# Patient Record
Sex: MALE | Race: WHITE | NOT HISPANIC OR LATINO | Employment: OTHER | URBAN - METROPOLITAN AREA
[De-identification: names, ages, dates, MRNs, and addresses within clinical notes are randomized per-mention and may not be internally consistent; named-entity substitution may affect disease eponyms.]

---

## 2018-02-02 ENCOUNTER — HOSPITAL ENCOUNTER (INPATIENT)
Facility: MEDICAL CENTER | Age: 58
LOS: 5 days | DRG: 233 | End: 2018-02-07
Attending: EMERGENCY MEDICINE | Admitting: HOSPITALIST
Payer: MEDICARE

## 2018-02-02 ENCOUNTER — RESOLUTE PROFESSIONAL BILLING HOSPITAL PROF FEE (OUTPATIENT)
Dept: HOSPITALIST | Facility: MEDICAL CENTER | Age: 58
End: 2018-02-02
Payer: MEDICARE

## 2018-02-02 ENCOUNTER — APPOINTMENT (OUTPATIENT)
Dept: RADIOLOGY | Facility: MEDICAL CENTER | Age: 58
DRG: 233 | End: 2018-02-02
Attending: EMERGENCY MEDICINE
Payer: MEDICARE

## 2018-02-02 DIAGNOSIS — G89.18 ACUTE POST-OPERATIVE PAIN: ICD-10-CM

## 2018-02-02 DIAGNOSIS — I21.4 NON-ST ELEVATION (NSTEMI) MYOCARDIAL INFARCTION (HCC): ICD-10-CM

## 2018-02-02 DIAGNOSIS — I24.9 ACS (ACUTE CORONARY SYNDROME) (HCC): ICD-10-CM

## 2018-02-02 LAB
ABO GROUP BLD: NORMAL
ABO GROUP BLD: NORMAL
ALBUMIN SERPL BCP-MCNC: 4.1 G/DL (ref 3.2–4.9)
ALBUMIN/GLOB SERPL: 1.8 G/DL
ALP SERPL-CCNC: 57 U/L (ref 30–99)
ALT SERPL-CCNC: 21 U/L (ref 2–50)
ANION GAP SERPL CALC-SCNC: 10 MMOL/L (ref 0–11.9)
ANION GAP SERPL CALC-SCNC: 8 MMOL/L (ref 0–11.9)
APTT PPP: 29.7 SEC (ref 24.7–36)
AST SERPL-CCNC: 34 U/L (ref 12–45)
BASOPHILS # BLD AUTO: 0.5 % (ref 0–1.8)
BASOPHILS # BLD: 0.04 K/UL (ref 0–0.12)
BILIRUB SERPL-MCNC: 0.9 MG/DL (ref 0.1–1.5)
BLD GP AB SCN SERPL QL: NORMAL
BNP SERPL-MCNC: 259 PG/ML (ref 0–100)
BUN SERPL-MCNC: 12 MG/DL (ref 8–22)
BUN SERPL-MCNC: 14 MG/DL (ref 8–22)
CALCIUM SERPL-MCNC: 8.4 MG/DL (ref 8.5–10.5)
CALCIUM SERPL-MCNC: 9.1 MG/DL (ref 8.5–10.5)
CHLORIDE SERPL-SCNC: 108 MMOL/L (ref 96–112)
CHLORIDE SERPL-SCNC: 108 MMOL/L (ref 96–112)
CHOLEST SERPL-MCNC: 157 MG/DL (ref 100–199)
CO2 SERPL-SCNC: 22 MMOL/L (ref 20–33)
CO2 SERPL-SCNC: 23 MMOL/L (ref 20–33)
CREAT SERPL-MCNC: 0.81 MG/DL (ref 0.5–1.4)
CREAT SERPL-MCNC: 0.88 MG/DL (ref 0.5–1.4)
EKG IMPRESSION: NORMAL
EOSINOPHIL # BLD AUTO: 0.07 K/UL (ref 0–0.51)
EOSINOPHIL NFR BLD: 0.8 % (ref 0–6.9)
ERYTHROCYTE [DISTWIDTH] IN BLOOD BY AUTOMATED COUNT: 41.6 FL (ref 35.9–50)
ERYTHROCYTE [DISTWIDTH] IN BLOOD BY AUTOMATED COUNT: 42.6 FL (ref 35.9–50)
GLOBULIN SER CALC-MCNC: 2.3 G/DL (ref 1.9–3.5)
GLUCOSE SERPL-MCNC: 102 MG/DL (ref 65–99)
GLUCOSE SERPL-MCNC: 96 MG/DL (ref 65–99)
HCT VFR BLD AUTO: 43.7 % (ref 42–52)
HCT VFR BLD AUTO: 43.9 % (ref 42–52)
HDLC SERPL-MCNC: 36 MG/DL
HGB BLD-MCNC: 15.4 G/DL (ref 14–18)
HGB BLD-MCNC: 15.5 G/DL (ref 14–18)
IMM GRANULOCYTES # BLD AUTO: 0.02 K/UL (ref 0–0.11)
IMM GRANULOCYTES NFR BLD AUTO: 0.2 % (ref 0–0.9)
INR PPP: 1.03 (ref 0.87–1.13)
LDLC SERPL CALC-MCNC: 101 MG/DL
LIPASE SERPL-CCNC: 11 U/L (ref 11–82)
LV EJECT FRACT  99904: 20
LV EJECT FRACT MOD 2C 99903: 45.13
LV EJECT FRACT MOD 4C 99902: 38.93
LV EJECT FRACT MOD BP 99901: 38.12
LYMPHOCYTES # BLD AUTO: 1.25 K/UL (ref 1–4.8)
LYMPHOCYTES NFR BLD: 14.2 % (ref 22–41)
MCH RBC QN AUTO: 34.1 PG (ref 27–33)
MCH RBC QN AUTO: 34.1 PG (ref 27–33)
MCHC RBC AUTO-ENTMCNC: 35.1 G/DL (ref 33.7–35.3)
MCHC RBC AUTO-ENTMCNC: 35.5 G/DL (ref 33.7–35.3)
MCV RBC AUTO: 96 FL (ref 81.4–97.8)
MCV RBC AUTO: 97.1 FL (ref 81.4–97.8)
MONOCYTES # BLD AUTO: 0.64 K/UL (ref 0–0.85)
MONOCYTES NFR BLD AUTO: 7.3 % (ref 0–13.4)
NEUTROPHILS # BLD AUTO: 6.8 K/UL (ref 1.82–7.42)
NEUTROPHILS NFR BLD: 77 % (ref 44–72)
NRBC # BLD AUTO: 0 K/UL
NRBC BLD-RTO: 0 /100 WBC
PLATELET # BLD AUTO: 193 K/UL (ref 164–446)
PLATELET # BLD AUTO: 196 K/UL (ref 164–446)
PMV BLD AUTO: 11 FL (ref 9–12.9)
PMV BLD AUTO: 11.1 FL (ref 9–12.9)
POTASSIUM SERPL-SCNC: 3.6 MMOL/L (ref 3.6–5.5)
POTASSIUM SERPL-SCNC: 3.7 MMOL/L (ref 3.6–5.5)
PROT SERPL-MCNC: 6.4 G/DL (ref 6–8.2)
PROTHROMBIN TIME: 13.2 SEC (ref 12–14.6)
RBC # BLD AUTO: 4.52 M/UL (ref 4.7–6.1)
RBC # BLD AUTO: 4.55 M/UL (ref 4.7–6.1)
RH BLD: NORMAL
RH BLD: NORMAL
SODIUM SERPL-SCNC: 139 MMOL/L (ref 135–145)
SODIUM SERPL-SCNC: 140 MMOL/L (ref 135–145)
TRIGL SERPL-MCNC: 99 MG/DL (ref 0–149)
TROPONIN I SERPL-MCNC: 8.25 NG/ML (ref 0–0.04)
TROPONIN I SERPL-MCNC: 9.58 NG/ML (ref 0–0.04)
WBC # BLD AUTO: 8.8 K/UL (ref 4.8–10.8)
WBC # BLD AUTO: 9.4 K/UL (ref 4.8–10.8)

## 2018-02-02 PROCEDURE — B2111ZZ FLUOROSCOPY OF MULTIPLE CORONARY ARTERIES USING LOW OSMOLAR CONTRAST: ICD-10-PCS | Performed by: INTERNAL MEDICINE

## 2018-02-02 PROCEDURE — 700102 HCHG RX REV CODE 250 W/ 637 OVERRIDE(OP)

## 2018-02-02 PROCEDURE — 85025 COMPLETE CBC W/AUTO DIFF WBC: CPT

## 2018-02-02 PROCEDURE — 360979 HCHG DIAGNOSTIC CATH

## 2018-02-02 PROCEDURE — 4A023N7 MEASUREMENT OF CARDIAC SAMPLING AND PRESSURE, LEFT HEART, PERCUTANEOUS APPROACH: ICD-10-PCS | Performed by: INTERNAL MEDICINE

## 2018-02-02 PROCEDURE — 85610 PROTHROMBIN TIME: CPT | Mod: 91

## 2018-02-02 PROCEDURE — 99152 MOD SED SAME PHYS/QHP 5/>YRS: CPT

## 2018-02-02 PROCEDURE — 80061 LIPID PANEL: CPT

## 2018-02-02 PROCEDURE — 80053 COMPREHEN METABOLIC PANEL: CPT

## 2018-02-02 PROCEDURE — A9270 NON-COVERED ITEM OR SERVICE: HCPCS

## 2018-02-02 PROCEDURE — 83690 ASSAY OF LIPASE: CPT

## 2018-02-02 PROCEDURE — 93005 ELECTROCARDIOGRAM TRACING: CPT

## 2018-02-02 PROCEDURE — 86900 BLOOD TYPING SEROLOGIC ABO: CPT

## 2018-02-02 PROCEDURE — 99291 CRITICAL CARE FIRST HOUR: CPT

## 2018-02-02 PROCEDURE — 700102 HCHG RX REV CODE 250 W/ 637 OVERRIDE(OP): Performed by: NURSE PRACTITIONER

## 2018-02-02 PROCEDURE — 93306 TTE W/DOPPLER COMPLETE: CPT | Mod: 26 | Performed by: INTERNAL MEDICINE

## 2018-02-02 PROCEDURE — 80048 BASIC METABOLIC PNL TOTAL CA: CPT

## 2018-02-02 PROCEDURE — 93458 L HRT ARTERY/VENTRICLE ANGIO: CPT

## 2018-02-02 PROCEDURE — 307093 HCHG TR BAND RADIAL

## 2018-02-02 PROCEDURE — 700111 HCHG RX REV CODE 636 W/ 250 OVERRIDE (IP): Performed by: EMERGENCY MEDICINE

## 2018-02-02 PROCEDURE — B2151ZZ FLUOROSCOPY OF LEFT HEART USING LOW OSMOLAR CONTRAST: ICD-10-PCS | Performed by: INTERNAL MEDICINE

## 2018-02-02 PROCEDURE — 700105 HCHG RX REV CODE 258: Performed by: INTERNAL MEDICINE

## 2018-02-02 PROCEDURE — A9270 NON-COVERED ITEM OR SERVICE: HCPCS | Performed by: HOSPITALIST

## 2018-02-02 PROCEDURE — 700117 HCHG RX CONTRAST REV CODE 255: Performed by: INTERNAL MEDICINE

## 2018-02-02 PROCEDURE — 86901 BLOOD TYPING SEROLOGIC RH(D): CPT

## 2018-02-02 PROCEDURE — 93005 ELECTROCARDIOGRAM TRACING: CPT | Performed by: EMERGENCY MEDICINE

## 2018-02-02 PROCEDURE — 86850 RBC ANTIBODY SCREEN: CPT

## 2018-02-02 PROCEDURE — 700102 HCHG RX REV CODE 250 W/ 637 OVERRIDE(OP): Performed by: INTERNAL MEDICINE

## 2018-02-02 PROCEDURE — C1894 INTRO/SHEATH, NON-LASER: HCPCS

## 2018-02-02 PROCEDURE — 700102 HCHG RX REV CODE 250 W/ 637 OVERRIDE(OP): Performed by: HOSPITALIST

## 2018-02-02 PROCEDURE — 85730 THROMBOPLASTIN TIME PARTIAL: CPT | Mod: 91

## 2018-02-02 PROCEDURE — 96365 THER/PROPH/DIAG IV INF INIT: CPT

## 2018-02-02 PROCEDURE — 84484 ASSAY OF TROPONIN QUANT: CPT | Mod: 91

## 2018-02-02 PROCEDURE — 83880 ASSAY OF NATRIURETIC PEPTIDE: CPT

## 2018-02-02 PROCEDURE — 93880 EXTRACRANIAL BILAT STUDY: CPT

## 2018-02-02 PROCEDURE — 93970 EXTREMITY STUDY: CPT

## 2018-02-02 PROCEDURE — 99223 1ST HOSP IP/OBS HIGH 75: CPT | Performed by: HOSPITALIST

## 2018-02-02 PROCEDURE — 83036 HEMOGLOBIN GLYCOSYLATED A1C: CPT

## 2018-02-02 PROCEDURE — 71045 X-RAY EXAM CHEST 1 VIEW: CPT

## 2018-02-02 PROCEDURE — 96375 TX/PRO/DX INJ NEW DRUG ADDON: CPT

## 2018-02-02 PROCEDURE — 99153 MOD SED SAME PHYS/QHP EA: CPT

## 2018-02-02 PROCEDURE — A9270 NON-COVERED ITEM OR SERVICE: HCPCS | Performed by: INTERNAL MEDICINE

## 2018-02-02 PROCEDURE — 96366 THER/PROPH/DIAG IV INF ADDON: CPT

## 2018-02-02 PROCEDURE — C1769 GUIDE WIRE: HCPCS

## 2018-02-02 PROCEDURE — 304952 HCHG R 2 PADS

## 2018-02-02 PROCEDURE — 770022 HCHG ROOM/CARE - ICU (200)

## 2018-02-02 PROCEDURE — 93306 TTE W/DOPPLER COMPLETE: CPT

## 2018-02-02 PROCEDURE — 700111 HCHG RX REV CODE 636 W/ 250 OVERRIDE (IP)

## 2018-02-02 PROCEDURE — 700101 HCHG RX REV CODE 250

## 2018-02-02 PROCEDURE — 85027 COMPLETE CBC AUTOMATED: CPT

## 2018-02-02 RX ORDER — MIDAZOLAM HYDROCHLORIDE 1 MG/ML
INJECTION INTRAMUSCULAR; INTRAVENOUS
Status: COMPLETED
Start: 2018-02-02 | End: 2018-02-02

## 2018-02-02 RX ORDER — LIDOCAINE HYDROCHLORIDE 20 MG/ML
INJECTION, SOLUTION INFILTRATION; PERINEURAL
Status: COMPLETED
Start: 2018-02-02 | End: 2018-02-02

## 2018-02-02 RX ORDER — HEPARIN SODIUM 1000 [USP'U]/ML
3800 INJECTION, SOLUTION INTRAVENOUS; SUBCUTANEOUS PRN
Status: DISCONTINUED | OUTPATIENT
Start: 2018-02-02 | End: 2018-02-02

## 2018-02-02 RX ORDER — PROMETHAZINE HYDROCHLORIDE 25 MG/1
12.5-25 TABLET ORAL EVERY 4 HOURS PRN
Status: DISCONTINUED | OUTPATIENT
Start: 2018-02-02 | End: 2018-02-03

## 2018-02-02 RX ORDER — NITROGLYCERIN 0.4 MG/1
0.4 TABLET SUBLINGUAL
Status: DISCONTINUED | OUTPATIENT
Start: 2018-02-02 | End: 2018-02-03

## 2018-02-02 RX ORDER — NITROGLYCERIN 80 MG/1
PATCH TRANSDERMAL
Status: COMPLETED
Start: 2018-02-02 | End: 2018-02-02

## 2018-02-02 RX ORDER — ASPIRIN 325 MG
325 TABLET ORAL DAILY
Status: DISCONTINUED | OUTPATIENT
Start: 2018-02-03 | End: 2018-02-02

## 2018-02-02 RX ORDER — SODIUM CHLORIDE 9 MG/ML
INJECTION, SOLUTION INTRAVENOUS CONTINUOUS
Status: DISCONTINUED | OUTPATIENT
Start: 2018-02-02 | End: 2018-02-02

## 2018-02-02 RX ORDER — VERAPAMIL HYDROCHLORIDE 2.5 MG/ML
INJECTION, SOLUTION INTRAVENOUS
Status: COMPLETED
Start: 2018-02-02 | End: 2018-02-02

## 2018-02-02 RX ORDER — HEPARIN SODIUM 1000 [USP'U]/ML
3800 INJECTION, SOLUTION INTRAVENOUS; SUBCUTANEOUS PRN
Status: DISCONTINUED | OUTPATIENT
Start: 2018-02-02 | End: 2018-02-03

## 2018-02-02 RX ORDER — NITROGLYCERIN 80 MG/1
1 PATCH TRANSDERMAL DAILY
Status: DISCONTINUED | OUTPATIENT
Start: 2018-02-02 | End: 2018-02-03

## 2018-02-02 RX ORDER — ALPRAZOLAM 0.25 MG/1
0.25 TABLET ORAL EVERY 6 HOURS PRN
Status: DISCONTINUED | OUTPATIENT
Start: 2018-02-02 | End: 2018-02-03

## 2018-02-02 RX ORDER — ASPIRIN 325 MG
325 TABLET ORAL DAILY
Status: DISCONTINUED | OUTPATIENT
Start: 2018-02-03 | End: 2018-02-03

## 2018-02-02 RX ORDER — ONDANSETRON 4 MG/1
4 TABLET, ORALLY DISINTEGRATING ORAL EVERY 4 HOURS PRN
Status: DISCONTINUED | OUTPATIENT
Start: 2018-02-02 | End: 2018-02-02

## 2018-02-02 RX ORDER — HEPARIN SODIUM,PORCINE 1000/ML
VIAL (ML) INJECTION
Status: COMPLETED
Start: 2018-02-02 | End: 2018-02-02

## 2018-02-02 RX ORDER — ONDANSETRON 2 MG/ML
4 INJECTION INTRAMUSCULAR; INTRAVENOUS EVERY 4 HOURS PRN
Status: DISCONTINUED | OUTPATIENT
Start: 2018-02-02 | End: 2018-02-03

## 2018-02-02 RX ORDER — OMEPRAZOLE 40 MG/1
40 CAPSULE, DELAYED RELEASE ORAL ONCE
COMMUNITY
End: 2018-04-02

## 2018-02-02 RX ORDER — ATORVASTATIN CALCIUM 80 MG/1
80 TABLET, FILM COATED ORAL
Status: DISCONTINUED | OUTPATIENT
Start: 2018-02-02 | End: 2018-02-07 | Stop reason: HOSPADM

## 2018-02-02 RX ORDER — ALPRAZOLAM 0.25 MG/1
0.25 TABLET ORAL 3 TIMES DAILY PRN
Status: DISCONTINUED | OUTPATIENT
Start: 2018-02-02 | End: 2018-02-02

## 2018-02-02 RX ORDER — NITROGLYCERIN 20 MG/100ML
0-200 INJECTION INTRAVENOUS CONTINUOUS
Status: DISCONTINUED | OUTPATIENT
Start: 2018-02-02 | End: 2018-02-03

## 2018-02-02 RX ORDER — ACETAMINOPHEN 325 MG/1
650 TABLET ORAL EVERY 6 HOURS PRN
Status: DISCONTINUED | OUTPATIENT
Start: 2018-02-02 | End: 2018-02-03

## 2018-02-02 RX ORDER — OMEPRAZOLE 40 MG/1
40 CAPSULE, DELAYED RELEASE ORAL ONCE
Status: DISCONTINUED | OUTPATIENT
Start: 2018-02-02 | End: 2018-02-02

## 2018-02-02 RX ORDER — HEPARIN SODIUM 1000 [USP'U]/ML
7000 INJECTION, SOLUTION INTRAVENOUS; SUBCUTANEOUS ONCE
Status: COMPLETED | OUTPATIENT
Start: 2018-02-02 | End: 2018-02-02

## 2018-02-02 RX ORDER — SODIUM CHLORIDE 9 MG/ML
INJECTION, SOLUTION INTRAVENOUS CONTINUOUS
Status: DISCONTINUED | OUTPATIENT
Start: 2018-02-02 | End: 2018-02-03

## 2018-02-02 RX ORDER — MORPHINE SULFATE 4 MG/ML
1-2 INJECTION, SOLUTION INTRAMUSCULAR; INTRAVENOUS EVERY 4 HOURS PRN
Status: DISCONTINUED | OUTPATIENT
Start: 2018-02-02 | End: 2018-02-02

## 2018-02-02 RX ORDER — MORPHINE SULFATE 10 MG/ML
5 INJECTION, SOLUTION INTRAMUSCULAR; INTRAVENOUS
Status: DISCONTINUED | OUTPATIENT
Start: 2018-02-02 | End: 2018-02-03

## 2018-02-02 RX ORDER — PROMETHAZINE HYDROCHLORIDE 25 MG/1
12.5-25 SUPPOSITORY RECTAL EVERY 4 HOURS PRN
Status: DISCONTINUED | OUTPATIENT
Start: 2018-02-02 | End: 2018-02-03

## 2018-02-02 RX ADMIN — VERAPAMIL HYDROCHLORIDE 5 MG: 2.5 INJECTION, SOLUTION INTRAVENOUS at 18:34

## 2018-02-02 RX ADMIN — SODIUM CHLORIDE: 9 INJECTION, SOLUTION INTRAVENOUS at 20:10

## 2018-02-02 RX ADMIN — LIDOCAINE HYDROCHLORIDE: 20 INJECTION, SOLUTION INFILTRATION; PERINEURAL at 18:33

## 2018-02-02 RX ADMIN — NITROGLYCERIN TRANSDERMAL SYSTEM 1 PATCH: 0.4 PATCH, EXTENDED RELEASE TRANSDERMAL at 19:30

## 2018-02-02 RX ADMIN — HEPARIN SODIUM 1450 UNITS/HR: 5000 INJECTION, SOLUTION INTRAVENOUS at 18:51

## 2018-02-02 RX ADMIN — HEPARIN SODIUM: 1000 INJECTION, SOLUTION INTRAVENOUS; SUBCUTANEOUS at 18:36

## 2018-02-02 RX ADMIN — MIDAZOLAM 2 MG: 1 INJECTION INTRAMUSCULAR; INTRAVENOUS at 18:34

## 2018-02-02 RX ADMIN — METOPROLOL TARTRATE 25 MG: 25 TABLET, FILM COATED ORAL at 20:01

## 2018-02-02 RX ADMIN — HEPARIN SODIUM 1450 UNITS/HR: 5000 INJECTION, SOLUTION INTRAVENOUS at 16:31

## 2018-02-02 RX ADMIN — MIDAZOLAM 2 MG: 1 INJECTION INTRAMUSCULAR; INTRAVENOUS at 18:48

## 2018-02-02 RX ADMIN — FENTANYL CITRATE 100 MCG: 50 INJECTION, SOLUTION INTRAMUSCULAR; INTRAVENOUS at 18:34

## 2018-02-02 RX ADMIN — ATORVASTATIN CALCIUM 80 MG: 80 TABLET, FILM COATED ORAL at 20:01

## 2018-02-02 RX ADMIN — HEPARIN SODIUM 2000 UNITS: 200 INJECTION, SOLUTION INTRAVENOUS at 18:33

## 2018-02-02 RX ADMIN — IOHEXOL 112 ML: 350 INJECTION, SOLUTION INTRAVENOUS at 18:50

## 2018-02-02 RX ADMIN — NITROGLYCERIN 10 ML: 20 INJECTION INTRAVENOUS at 18:33

## 2018-02-02 RX ADMIN — NITROGLYCERIN 1 PATCH: 0.4 PATCH TRANSDERMAL at 18:50

## 2018-02-02 RX ADMIN — HEPARIN SODIUM 7000 UNITS: 1000 INJECTION, SOLUTION INTRAVENOUS; SUBCUTANEOUS at 16:30

## 2018-02-02 ASSESSMENT — ENCOUNTER SYMPTOMS
CHILLS: 0
DIZZINESS: 0
NAUSEA: 0
HEADACHES: 0
HEARTBURN: 1
PALPITATIONS: 0
ABDOMINAL PAIN: 1
DEPRESSION: 0
FEVER: 0
VOMITING: 0

## 2018-02-02 ASSESSMENT — COPD QUESTIONNAIRES
DO YOU EVER COUGH UP ANY MUCUS OR PHLEGM?: NO/ONLY WITH OCCASIONAL COLDS OR INFECTIONS
DURING THE PAST 4 WEEKS HOW MUCH DID YOU FEEL SHORT OF BREATH: SOME OF THE TIME
HAVE YOU SMOKED AT LEAST 100 CIGARETTES IN YOUR ENTIRE LIFE: NO/DON'T KNOW
COPD SCREENING SCORE: 3

## 2018-02-02 ASSESSMENT — PAIN SCALES - GENERAL
PAINLEVEL_OUTOF10: 0
PAINLEVEL_OUTOF10: 1

## 2018-02-02 ASSESSMENT — LIFESTYLE VARIABLES
EVER_SMOKED: NEVER
ALCOHOL_USE: NO
DO YOU DRINK ALCOHOL: NO
EVER_SMOKED: NEVER

## 2018-02-02 NOTE — ED NOTES
No home medications reported by the patient  Pt did take Prilosec 40 mg this AM at his doctor's office but does not take on a regular basis  No abx in past 30 days  NKDA confirmed

## 2018-02-02 NOTE — ED TRIAGE NOTES
.  Chief Complaint   Patient presents with   • Chest Pain   • Abnormal Labs   Pt BIB EMS - transfer from [facility].  Pt c/o mid-epigastric pain(worsening after meals) for approximately one month. Yesterday Pt reports chest pain radiating to left shoulder and arm.  Today chest pain radiated to jaw.  Pt went to local urgent care this morning at approximately 0800.  Pt had EKG changes and elevated Troponin - transferred to [facility].  Pt received  PO and on a Nitro gtt PTA.

## 2018-02-03 ENCOUNTER — APPOINTMENT (OUTPATIENT)
Dept: RADIOLOGY | Facility: MEDICAL CENTER | Age: 58
DRG: 233 | End: 2018-02-03
Attending: THORACIC SURGERY (CARDIOTHORACIC VASCULAR SURGERY)
Payer: MEDICARE

## 2018-02-03 LAB
ACT BLD: 114 SEC (ref 74–137)
ACT BLD: 659 SEC (ref 74–137)
ACT BLD: 802 SEC (ref 74–137)
ACT BLD: 819 SEC (ref 74–137)
ACTION RANGE TRIGGERED IACRT: NO
APPEARANCE UR: CLEAR
APTT PPP: 31.9 SEC (ref 24.7–36)
APTT PPP: 53 SEC (ref 24.7–36)
BASE EXCESS BLDA CALC-SCNC: -1 MMOL/L (ref -4–3)
BASE EXCESS BLDA CALC-SCNC: -1 MMOL/L (ref -4–3)
BASE EXCESS BLDA CALC-SCNC: -3 MMOL/L (ref -4–3)
BASE EXCESS BLDA CALC-SCNC: -4 MMOL/L (ref -4–3)
BILIRUB UR QL STRIP.AUTO: NEGATIVE
BODY TEMPERATURE: ABNORMAL DEGREES
CA-I BLD ISE-SCNC: 1 MMOL/L (ref 1.1–1.3)
CA-I BLD ISE-SCNC: 1.15 MMOL/L (ref 1.1–1.3)
CA-I BLD ISE-SCNC: 1.2 MMOL/L (ref 1.1–1.3)
CA-I BLD ISE-SCNC: 1.2 MMOL/L (ref 1.1–1.3)
CO2 BLDA-SCNC: 22 MMOL/L (ref 20–33)
CO2 BLDA-SCNC: 22 MMOL/L (ref 20–33)
CO2 BLDA-SCNC: 23 MMOL/L (ref 20–33)
CO2 BLDA-SCNC: 23 MMOL/L (ref 20–33)
CO2 BLDA-SCNC: 25 MMOL/L (ref 20–33)
CO2 BLDA-SCNC: 26 MMOL/L (ref 20–33)
COLOR UR: YELLOW
EKG IMPRESSION: NORMAL
EST. AVERAGE GLUCOSE BLD GHB EST-MCNC: 120 MG/DL
GLUCOSE BLD-MCNC: 110 MG/DL (ref 65–99)
GLUCOSE BLD-MCNC: 110 MG/DL (ref 65–99)
GLUCOSE BLD-MCNC: 113 MG/DL (ref 65–99)
GLUCOSE BLD-MCNC: 120 MG/DL (ref 65–99)
GLUCOSE BLD-MCNC: 134 MG/DL (ref 65–99)
GLUCOSE BLD-MCNC: 137 MG/DL (ref 65–99)
GLUCOSE BLD-MCNC: 148 MG/DL (ref 65–99)
GLUCOSE BLD-MCNC: 151 MG/DL (ref 65–99)
GLUCOSE BLD-MCNC: 99 MG/DL (ref 65–99)
GLUCOSE UR STRIP.AUTO-MCNC: NEGATIVE MG/DL
HBA1C MFR BLD: 5.8 % (ref 0–5.6)
HCO3 BLDA-SCNC: 21.1 MMOL/L (ref 17–25)
HCO3 BLDA-SCNC: 21.4 MMOL/L (ref 17–25)
HCO3 BLDA-SCNC: 21.8 MMOL/L (ref 17–25)
HCO3 BLDA-SCNC: 22.1 MMOL/L (ref 17–25)
HCO3 BLDA-SCNC: 23.5 MMOL/L (ref 17–25)
HCO3 BLDA-SCNC: 24.6 MMOL/L (ref 17–25)
HCT VFR BLD CALC: 27 % (ref 42–52)
HCT VFR BLD CALC: 34 % (ref 42–52)
HCT VFR BLD CALC: 35 % (ref 42–52)
HCT VFR BLD CALC: 36 % (ref 42–52)
HGB BLD-MCNC: 11.6 G/DL (ref 14–18)
HGB BLD-MCNC: 11.9 G/DL (ref 14–18)
HGB BLD-MCNC: 12.2 G/DL (ref 14–18)
HGB BLD-MCNC: 9.2 G/DL (ref 14–18)
INR PPP: 1.02 (ref 0.87–1.13)
INR PPP: 1.21 (ref 0.87–1.13)
INST. QUALIFIED PATIENT IIQPT: YES
KETONES UR STRIP.AUTO-MCNC: 15 MG/DL
LEUKOCYTE ESTERASE UR QL STRIP.AUTO: NEGATIVE
MAGNESIUM SERPL-MCNC: 2.1 MG/DL (ref 1.5–2.5)
MICRO URNS: ABNORMAL
NITRITE UR QL STRIP.AUTO: NEGATIVE
O2/TOTAL GAS SETTING VFR VENT: 100 %
O2/TOTAL GAS SETTING VFR VENT: 100 %
O2/TOTAL GAS SETTING VFR VENT: 40 %
O2/TOTAL GAS SETTING VFR VENT: 50 %
O2/TOTAL GAS SETTING VFR VENT: 60 %
O2/TOTAL GAS SETTING VFR VENT: 60 %
PCO2 BLDA: 35 MMHG (ref 26–37)
PCO2 BLDA: 35.4 MMHG (ref 26–37)
PCO2 BLDA: 35.9 MMHG (ref 26–37)
PCO2 BLDA: 36.1 MMHG (ref 26–37)
PCO2 BLDA: 37.4 MMHG (ref 26–37)
PCO2 BLDA: 41.9 MMHG (ref 26–37)
PCO2 TEMP ADJ BLDA: 34.3 MMHG (ref 26–37)
PCO2 TEMP ADJ BLDA: 34.7 MMHG (ref 26–37)
PCO2 TEMP ADJ BLDA: 34.9 MMHG (ref 26–37)
PCO2 TEMP ADJ BLDA: 35 MMHG (ref 26–37)
PH BLDA: 7.37 [PH] (ref 7.4–7.5)
PH BLDA: 7.38 [PH] (ref 7.4–7.5)
PH BLDA: 7.38 [PH] (ref 7.4–7.5)
PH BLDA: 7.39 [PH] (ref 7.4–7.5)
PH BLDA: 7.4 [PH] (ref 7.4–7.5)
PH BLDA: 7.42 [PH] (ref 7.4–7.5)
PH TEMP ADJ BLDA: 7.39 [PH] (ref 7.4–7.5)
PH TEMP ADJ BLDA: 7.39 [PH] (ref 7.4–7.5)
PH TEMP ADJ BLDA: 7.41 [PH] (ref 7.4–7.5)
PH TEMP ADJ BLDA: 7.44 [PH] (ref 7.4–7.5)
PH UR STRIP.AUTO: 5.5 [PH]
PLATELET # BLD AUTO: 140 K/UL (ref 164–446)
PO2 BLDA: 243 MMHG (ref 64–87)
PO2 BLDA: 418 MMHG (ref 64–87)
PO2 BLDA: 67 MMHG (ref 64–87)
PO2 BLDA: 90 MMHG (ref 64–87)
PO2 BLDA: 93 MMHG (ref 64–87)
PO2 BLDA: 96 MMHG (ref 64–87)
PO2 TEMP ADJ BLDA: 63 MMHG (ref 64–87)
PO2 TEMP ADJ BLDA: 89 MMHG (ref 64–87)
PO2 TEMP ADJ BLDA: 90 MMHG (ref 64–87)
PO2 TEMP ADJ BLDA: 91 MMHG (ref 64–87)
POTASSIUM BLD-SCNC: 3.5 MMOL/L (ref 3.6–5.5)
POTASSIUM BLD-SCNC: 3.9 MMOL/L (ref 3.6–5.5)
POTASSIUM BLD-SCNC: 4.2 MMOL/L (ref 3.6–5.5)
POTASSIUM BLD-SCNC: 4.9 MMOL/L (ref 3.6–5.5)
POTASSIUM SERPL-SCNC: 3.8 MMOL/L (ref 3.6–5.5)
PROT UR QL STRIP: NEGATIVE MG/DL
PROTHROMBIN TIME: 13.1 SEC (ref 12–14.6)
PROTHROMBIN TIME: 15 SEC (ref 12–14.6)
RBC UR QL AUTO: NEGATIVE
SAO2 % BLDA: 100 % (ref 93–99)
SAO2 % BLDA: 100 % (ref 93–99)
SAO2 % BLDA: 93 % (ref 93–99)
SAO2 % BLDA: 97 % (ref 93–99)
SAO2 % BLDA: 97 % (ref 93–99)
SAO2 % BLDA: 98 % (ref 93–99)
SODIUM BLD-SCNC: 137 MMOL/L (ref 135–145)
SODIUM BLD-SCNC: 141 MMOL/L (ref 135–145)
SODIUM BLD-SCNC: 142 MMOL/L (ref 135–145)
SODIUM BLD-SCNC: 143 MMOL/L (ref 135–145)
SP GR UR STRIP.AUTO: 1.03
SPECIMEN DRAWN FROM PATIENT: ABNORMAL
UROBILINOGEN UR STRIP.AUTO-MCNC: 0.2 MG/DL

## 2018-02-03 PROCEDURE — 700111 HCHG RX REV CODE 636 W/ 250 OVERRIDE (IP): Performed by: NURSE PRACTITIONER

## 2018-02-03 PROCEDURE — B24BZZ4 ULTRASONOGRAPHY OF HEART WITH AORTA, TRANSESOPHAGEAL: ICD-10-PCS | Performed by: THORACIC SURGERY (CARDIOTHORACIC VASCULAR SURGERY)

## 2018-02-03 PROCEDURE — 94002 VENT MGMT INPAT INIT DAY: CPT

## 2018-02-03 PROCEDURE — 81003 URINALYSIS AUTO W/O SCOPE: CPT

## 2018-02-03 PROCEDURE — 82803 BLOOD GASES ANY COMBINATION: CPT | Mod: 91

## 2018-02-03 PROCEDURE — 700102 HCHG RX REV CODE 250 W/ 637 OVERRIDE(OP): Performed by: HOSPITALIST

## 2018-02-03 PROCEDURE — 93005 ELECTROCARDIOGRAM TRACING: CPT | Performed by: NURSE PRACTITIONER

## 2018-02-03 PROCEDURE — A6402 STERILE GAUZE <= 16 SQ IN: HCPCS | Performed by: THORACIC SURGERY (CARDIOTHORACIC VASCULAR SURGERY)

## 2018-02-03 PROCEDURE — 84295 ASSAY OF SERUM SODIUM: CPT | Mod: 91

## 2018-02-03 PROCEDURE — 502654 HCHG INSERT, OFF-PUMP: Performed by: THORACIC SURGERY (CARDIOTHORACIC VASCULAR SURGERY)

## 2018-02-03 PROCEDURE — 503000 HCHG SUTURE, OHS: Performed by: THORACIC SURGERY (CARDIOTHORACIC VASCULAR SURGERY)

## 2018-02-03 PROCEDURE — A9270 NON-COVERED ITEM OR SERVICE: HCPCS | Performed by: NURSE PRACTITIONER

## 2018-02-03 PROCEDURE — 06BP4ZZ EXCISION OF RIGHT SAPHENOUS VEIN, PERCUTANEOUS ENDOSCOPIC APPROACH: ICD-10-PCS | Performed by: THORACIC SURGERY (CARDIOTHORACIC VASCULAR SURGERY)

## 2018-02-03 PROCEDURE — 700102 HCHG RX REV CODE 250 W/ 637 OVERRIDE(OP): Performed by: NURSE PRACTITIONER

## 2018-02-03 PROCEDURE — C1898 LEAD, PMKR, OTHER THAN TRANS: HCPCS | Performed by: THORACIC SURGERY (CARDIOTHORACIC VASCULAR SURGERY)

## 2018-02-03 PROCEDURE — 82962 GLUCOSE BLOOD TEST: CPT | Mod: 91

## 2018-02-03 PROCEDURE — 93312 ECHO TRANSESOPHAGEAL: CPT

## 2018-02-03 PROCEDURE — 500890 HCHG PACK, OPEN HEART: Performed by: THORACIC SURGERY (CARDIOTHORACIC VASCULAR SURGERY)

## 2018-02-03 PROCEDURE — C1894 INTRO/SHEATH, NON-LASER: HCPCS | Performed by: THORACIC SURGERY (CARDIOTHORACIC VASCULAR SURGERY)

## 2018-02-03 PROCEDURE — 500896 HCHG PACK, VASCULAR (FOR ROOM 10): Performed by: THORACIC SURGERY (CARDIOTHORACIC VASCULAR SURGERY)

## 2018-02-03 PROCEDURE — 700105 HCHG RX REV CODE 258: Performed by: NURSE PRACTITIONER

## 2018-02-03 PROCEDURE — 93010 ELECTROCARDIOGRAM REPORT: CPT | Performed by: INTERNAL MEDICINE

## 2018-02-03 PROCEDURE — C1751 CATH, INF, PER/CENT/MIDLINE: HCPCS | Performed by: THORACIC SURGERY (CARDIOTHORACIC VASCULAR SURGERY)

## 2018-02-03 PROCEDURE — 500734 HCHG INSERT, STEALTH: Performed by: THORACIC SURGERY (CARDIOTHORACIC VASCULAR SURGERY)

## 2018-02-03 PROCEDURE — 501745 HCHG WIRE, SURGICAL STEEL: Performed by: THORACIC SURGERY (CARDIOTHORACIC VASCULAR SURGERY)

## 2018-02-03 PROCEDURE — 700111 HCHG RX REV CODE 636 W/ 250 OVERRIDE (IP)

## 2018-02-03 PROCEDURE — 160048 HCHG OR STATISTICAL LEVEL 1-5: Performed by: THORACIC SURGERY (CARDIOTHORACIC VASCULAR SURGERY)

## 2018-02-03 PROCEDURE — 700105 HCHG RX REV CODE 258: Performed by: INTERNAL MEDICINE

## 2018-02-03 PROCEDURE — 82330 ASSAY OF CALCIUM: CPT | Mod: 91

## 2018-02-03 PROCEDURE — 500767 HCHG KIT, ENDOSCOPIC VEIN HARVEST: Performed by: THORACIC SURGERY (CARDIOTHORACIC VASCULAR SURGERY)

## 2018-02-03 PROCEDURE — 94150 VITAL CAPACITY TEST: CPT

## 2018-02-03 PROCEDURE — 85347 COAGULATION TIME ACTIVATED: CPT | Mod: 91

## 2018-02-03 PROCEDURE — C1729 CATH, DRAINAGE: HCPCS | Performed by: THORACIC SURGERY (CARDIOTHORACIC VASCULAR SURGERY)

## 2018-02-03 PROCEDURE — 500002 HCHG ADHESIVE, DERMABOND: Performed by: THORACIC SURGERY (CARDIOTHORACIC VASCULAR SURGERY)

## 2018-02-03 PROCEDURE — 85014 HEMATOCRIT: CPT | Mod: 91

## 2018-02-03 PROCEDURE — 85730 THROMBOPLASTIN TIME PARTIAL: CPT

## 2018-02-03 PROCEDURE — 84132 ASSAY OF SERUM POTASSIUM: CPT | Mod: 91

## 2018-02-03 PROCEDURE — 500385 HCHG DRAIN, PLEUROVAC ADUL: Performed by: THORACIC SURGERY (CARDIOTHORACIC VASCULAR SURGERY)

## 2018-02-03 PROCEDURE — 700101 HCHG RX REV CODE 250: Performed by: NURSE PRACTITIONER

## 2018-02-03 PROCEDURE — 110372 HCHG SHELL REV 278: Performed by: THORACIC SURGERY (CARDIOTHORACIC VASCULAR SURGERY)

## 2018-02-03 PROCEDURE — 770022 HCHG ROOM/CARE - ICU (200)

## 2018-02-03 PROCEDURE — 71045 X-RAY EXAM CHEST 1 VIEW: CPT

## 2018-02-03 PROCEDURE — 700111 HCHG RX REV CODE 636 W/ 250 OVERRIDE (IP): Performed by: INTERNAL MEDICINE

## 2018-02-03 PROCEDURE — 500294 HCHG CLAMP, BULLDOG 1/2 FORCE BLUE: Performed by: THORACIC SURGERY (CARDIOTHORACIC VASCULAR SURGERY)

## 2018-02-03 PROCEDURE — 700101 HCHG RX REV CODE 250

## 2018-02-03 PROCEDURE — 5A1221Z PERFORMANCE OF CARDIAC OUTPUT, CONTINUOUS: ICD-10-PCS | Performed by: THORACIC SURGERY (CARDIOTHORACIC VASCULAR SURGERY)

## 2018-02-03 PROCEDURE — 93325 DOPPLER ECHO COLOR FLOW MAPG: CPT

## 2018-02-03 PROCEDURE — 021109W BYPASS CORONARY ARTERY, TWO ARTERIES FROM AORTA WITH AUTOLOGOUS VENOUS TISSUE, OPEN APPROACH: ICD-10-PCS | Performed by: THORACIC SURGERY (CARDIOTHORACIC VASCULAR SURGERY)

## 2018-02-03 PROCEDURE — 700105 HCHG RX REV CODE 258

## 2018-02-03 PROCEDURE — P9047 ALBUMIN (HUMAN), 25%, 50ML: HCPCS | Mod: JG

## 2018-02-03 PROCEDURE — 85049 AUTOMATED PLATELET COUNT: CPT

## 2018-02-03 PROCEDURE — A9270 NON-COVERED ITEM OR SERVICE: HCPCS | Performed by: HOSPITALIST

## 2018-02-03 PROCEDURE — 503001 HCHG PERFUSION: Performed by: THORACIC SURGERY (CARDIOTHORACIC VASCULAR SURGERY)

## 2018-02-03 PROCEDURE — 02100Z9 BYPASS CORONARY ARTERY, ONE ARTERY FROM LEFT INTERNAL MAMMARY, OPEN APPROACH: ICD-10-PCS | Performed by: THORACIC SURGERY (CARDIOTHORACIC VASCULAR SURGERY)

## 2018-02-03 PROCEDURE — C9248 INJ, CLEVIDIPINE BUTYRATE: HCPCS | Mod: JG

## 2018-02-03 PROCEDURE — 160042 HCHG SURGERY MINUTES - EA ADDL 1 MIN LEVEL 5: Performed by: THORACIC SURGERY (CARDIOTHORACIC VASCULAR SURGERY)

## 2018-02-03 PROCEDURE — 160009 HCHG ANES TIME/MIN: Performed by: THORACIC SURGERY (CARDIOTHORACIC VASCULAR SURGERY)

## 2018-02-03 PROCEDURE — 83735 ASSAY OF MAGNESIUM: CPT

## 2018-02-03 PROCEDURE — 85610 PROTHROMBIN TIME: CPT

## 2018-02-03 PROCEDURE — 160031 HCHG SURGERY MINUTES - 1ST 30 MINS LEVEL 5: Performed by: THORACIC SURGERY (CARDIOTHORACIC VASCULAR SURGERY)

## 2018-02-03 PROCEDURE — C1725 CATH, TRANSLUMIN NON-LASER: HCPCS | Performed by: THORACIC SURGERY (CARDIOTHORACIC VASCULAR SURGERY)

## 2018-02-03 PROCEDURE — 82947 ASSAY GLUCOSE BLOOD QUANT: CPT | Mod: 91

## 2018-02-03 PROCEDURE — 501519 HCHG SUTURE, E PACK: Performed by: THORACIC SURGERY (CARDIOTHORACIC VASCULAR SURGERY)

## 2018-02-03 DEVICE — LOCATORS AC VEIN GRAFT (OHS) - 10/BX  SCANLAN #250-1001-83: Type: IMPLANTABLE DEVICE | Site: HEART | Status: FUNCTIONAL

## 2018-02-03 RX ORDER — ALUMINA, MAGNESIA, AND SIMETHICONE 2400; 2400; 240 MG/30ML; MG/30ML; MG/30ML
30 SUSPENSION ORAL EVERY 4 HOURS PRN
Status: DISCONTINUED | OUTPATIENT
Start: 2018-02-03 | End: 2018-02-07 | Stop reason: HOSPADM

## 2018-02-03 RX ORDER — ACETAMINOPHEN 650 MG/1
650 SUPPOSITORY RECTAL EVERY 4 HOURS PRN
Status: DISCONTINUED | OUTPATIENT
Start: 2018-02-03 | End: 2018-02-07 | Stop reason: HOSPADM

## 2018-02-03 RX ORDER — DEXTROSE MONOHYDRATE 25 G/50ML
25 INJECTION, SOLUTION INTRAVENOUS PRN
Status: DISCONTINUED | OUTPATIENT
Start: 2018-02-03 | End: 2018-02-05 | Stop reason: ALTCHOICE

## 2018-02-03 RX ORDER — ESMOLOL HYDROCHLORIDE 10 MG/ML
0-300 INJECTION, SOLUTION INTRAVENOUS CONTINUOUS
Status: DISCONTINUED | OUTPATIENT
Start: 2018-02-03 | End: 2018-02-04 | Stop reason: ALTCHOICE

## 2018-02-03 RX ORDER — DOCUSATE SODIUM 100 MG/1
100 CAPSULE, LIQUID FILLED ORAL EVERY MORNING
Status: DISCONTINUED | OUTPATIENT
Start: 2018-02-03 | End: 2018-02-07 | Stop reason: HOSPADM

## 2018-02-03 RX ORDER — TRAMADOL HYDROCHLORIDE 50 MG/1
50 TABLET ORAL EVERY 4 HOURS PRN
Status: DISCONTINUED | OUTPATIENT
Start: 2018-02-03 | End: 2018-02-07 | Stop reason: HOSPADM

## 2018-02-03 RX ORDER — SODIUM CHLORIDE 9 MG/ML
INJECTION, SOLUTION INTRAVENOUS CONTINUOUS
Status: DISCONTINUED | OUTPATIENT
Start: 2018-02-03 | End: 2018-02-07 | Stop reason: HOSPADM

## 2018-02-03 RX ORDER — OXYCODONE HYDROCHLORIDE 10 MG/1
10 TABLET ORAL
Status: DISCONTINUED | OUTPATIENT
Start: 2018-02-03 | End: 2018-02-07 | Stop reason: HOSPADM

## 2018-02-03 RX ORDER — SODIUM CHLORIDE, SODIUM GLUCONATE, SODIUM ACETATE, POTASSIUM CHLORIDE AND MAGNESIUM CHLORIDE 526; 502; 368; 37; 30 MG/100ML; MG/100ML; MG/100ML; MG/100ML; MG/100ML
INJECTION, SOLUTION INTRAVENOUS PRN
Status: DISCONTINUED | OUTPATIENT
Start: 2018-02-03 | End: 2018-02-04 | Stop reason: ALTCHOICE

## 2018-02-03 RX ORDER — ACETAMINOPHEN 325 MG/1
650 TABLET ORAL EVERY 4 HOURS PRN
Status: DISCONTINUED | OUTPATIENT
Start: 2018-02-03 | End: 2018-02-07 | Stop reason: HOSPADM

## 2018-02-03 RX ORDER — OXYCODONE HYDROCHLORIDE 10 MG/1
5 TABLET ORAL
Status: DISCONTINUED | OUTPATIENT
Start: 2018-02-03 | End: 2018-02-07 | Stop reason: HOSPADM

## 2018-02-03 RX ORDER — NITROGLYCERIN 20 MG/100ML
0-100 INJECTION INTRAVENOUS CONTINUOUS
Status: DISCONTINUED | OUTPATIENT
Start: 2018-02-03 | End: 2018-02-04 | Stop reason: ALTCHOICE

## 2018-02-03 RX ORDER — HYDROCODONE BITARTRATE AND ACETAMINOPHEN 5; 325 MG/1; MG/1
1-2 TABLET ORAL EVERY 4 HOURS PRN
Status: DISCONTINUED | OUTPATIENT
Start: 2018-02-03 | End: 2018-02-07 | Stop reason: HOSPADM

## 2018-02-03 RX ORDER — ONDANSETRON 2 MG/ML
4 INJECTION INTRAMUSCULAR; INTRAVENOUS EVERY 6 HOURS PRN
Status: DISCONTINUED | OUTPATIENT
Start: 2018-02-03 | End: 2018-02-07 | Stop reason: HOSPADM

## 2018-02-03 RX ORDER — DIPHENHYDRAMINE HCL 25 MG
25 TABLET ORAL
Status: DISCONTINUED | OUTPATIENT
Start: 2018-02-03 | End: 2018-02-07 | Stop reason: HOSPADM

## 2018-02-03 RX ORDER — AMOXICILLIN 250 MG
1 CAPSULE ORAL
Status: DISCONTINUED | OUTPATIENT
Start: 2018-02-03 | End: 2018-02-07 | Stop reason: HOSPADM

## 2018-02-03 RX ORDER — PROMETHAZINE HYDROCHLORIDE 25 MG/1
25 SUPPOSITORY RECTAL EVERY 6 HOURS PRN
Status: DISCONTINUED | OUTPATIENT
Start: 2018-02-03 | End: 2018-02-07 | Stop reason: HOSPADM

## 2018-02-03 RX ORDER — LACTULOSE 20 G/30ML
30 SOLUTION ORAL
Status: DISCONTINUED | OUTPATIENT
Start: 2018-02-03 | End: 2018-02-07 | Stop reason: HOSPADM

## 2018-02-03 RX ORDER — PAPAVERINE HYDROCHLORIDE 30 MG/ML
INJECTION INTRAMUSCULAR; INTRAVENOUS
Status: DISCONTINUED | OUTPATIENT
Start: 2018-02-03 | End: 2018-02-03 | Stop reason: HOSPADM

## 2018-02-03 RX ORDER — MIDAZOLAM HYDROCHLORIDE 1 MG/ML
.5-2 INJECTION INTRAMUSCULAR; INTRAVENOUS
Status: DISCONTINUED | OUTPATIENT
Start: 2018-02-03 | End: 2018-02-03

## 2018-02-03 RX ORDER — MORPHINE SULFATE 4 MG/ML
4 INJECTION, SOLUTION INTRAMUSCULAR; INTRAVENOUS
Status: DISCONTINUED | OUTPATIENT
Start: 2018-02-03 | End: 2018-02-03

## 2018-02-03 RX ORDER — SODIUM CHLORIDE 9 MG/ML
INJECTION, SOLUTION INTRAVENOUS
Status: ACTIVE
Start: 2018-02-03 | End: 2018-02-03

## 2018-02-03 RX ORDER — MAGNESIUM SULFATE 1 G/100ML
1 INJECTION INTRAVENOUS
Status: COMPLETED | OUTPATIENT
Start: 2018-02-03 | End: 2018-02-05

## 2018-02-03 RX ORDER — CLOPIDOGREL BISULFATE 75 MG/1
75 TABLET ORAL DAILY
Status: DISCONTINUED | OUTPATIENT
Start: 2018-02-04 | End: 2018-02-07 | Stop reason: HOSPADM

## 2018-02-03 RX ORDER — ENEMA 19; 7 G/133ML; G/133ML
1 ENEMA RECTAL
Status: DISCONTINUED | OUTPATIENT
Start: 2018-02-03 | End: 2018-02-07 | Stop reason: HOSPADM

## 2018-02-03 RX ORDER — AMOXICILLIN 250 MG
1 CAPSULE ORAL NIGHTLY
Status: DISCONTINUED | OUTPATIENT
Start: 2018-02-03 | End: 2018-02-07 | Stop reason: HOSPADM

## 2018-02-03 RX ORDER — MAGNESIUM HYDROXIDE 1200 MG/15ML
LIQUID ORAL
Status: DISCONTINUED | OUTPATIENT
Start: 2018-02-03 | End: 2018-02-03 | Stop reason: HOSPADM

## 2018-02-03 RX ORDER — BISACODYL 10 MG
10 SUPPOSITORY, RECTAL RECTAL
Status: DISCONTINUED | OUTPATIENT
Start: 2018-02-03 | End: 2018-02-07 | Stop reason: HOSPADM

## 2018-02-03 RX ADMIN — VANCOMYCIN HYDROCHLORIDE 1500 MG: 100 INJECTION, POWDER, LYOPHILIZED, FOR SOLUTION INTRAVENOUS at 20:30

## 2018-02-03 RX ADMIN — POTASSIUM CHLORIDE 10 MEQ: 2 INJECTION, SOLUTION, CONCENTRATE INTRAVENOUS at 20:04

## 2018-02-03 RX ADMIN — MORPHINE SULFATE 4 MG: 4 INJECTION INTRAVENOUS at 14:31

## 2018-02-03 RX ADMIN — SODIUM CHLORIDE 1 UNITS/HR: 9 INJECTION, SOLUTION INTRAVENOUS at 14:02

## 2018-02-03 RX ADMIN — ATORVASTATIN CALCIUM 80 MG: 80 TABLET, FILM COATED ORAL at 19:10

## 2018-02-03 RX ADMIN — INSULIN HUMAN 2 UNITS: 100 INJECTION, SOLUTION PARENTERAL at 15:00

## 2018-02-03 RX ADMIN — SODIUM CHLORIDE: 9 INJECTION, SOLUTION INTRAVENOUS at 02:01

## 2018-02-03 RX ADMIN — SODIUM CHLORIDE, SODIUM GLUCONATE, SODIUM ACETATE, POTASSIUM CHLORIDE AND MAGNESIUM CHLORIDE 1000 ML: 526; 502; 368; 37; 30 INJECTION, SOLUTION INTRAVENOUS at 15:23

## 2018-02-03 RX ADMIN — OXYCODONE HYDROCHLORIDE 10 MG: 10 TABLET ORAL at 22:10

## 2018-02-03 RX ADMIN — ONDANSETRON 4 MG: 2 INJECTION INTRAMUSCULAR; INTRAVENOUS at 16:36

## 2018-02-03 RX ADMIN — SODIUM CHLORIDE, SODIUM GLUCONATE, SODIUM ACETATE, POTASSIUM CHLORIDE AND MAGNESIUM CHLORIDE 1000 ML: 526; 502; 368; 37; 30 INJECTION, SOLUTION INTRAVENOUS at 23:27

## 2018-02-03 RX ADMIN — HYDROCODONE BITARTRATE AND ACETAMINOPHEN 2 TABLET: 5; 325 TABLET ORAL at 16:46

## 2018-02-03 RX ADMIN — MAGNESIUM SULFATE HEPTAHYDRATE 1 G: 1 INJECTION, SOLUTION INTRAVENOUS at 14:30

## 2018-02-03 RX ADMIN — SODIUM CHLORIDE: 9 INJECTION, SOLUTION INTRAVENOUS at 13:15

## 2018-02-03 RX ADMIN — HEPARIN SODIUM 3800 UNITS: 1000 INJECTION, SOLUTION INTRAVENOUS; SUBCUTANEOUS at 01:36

## 2018-02-03 RX ADMIN — STANDARDIZED SENNA CONCENTRATE AND DOCUSATE SODIUM 1 TABLET: 8.6; 5 TABLET, FILM COATED ORAL at 19:10

## 2018-02-03 RX ADMIN — MORPHINE SULFATE 4 MG: 4 INJECTION INTRAVENOUS at 15:32

## 2018-02-03 RX ADMIN — INSULIN HUMAN 1 UNITS: 100 INJECTION, SOLUTION PARENTERAL at 16:00

## 2018-02-03 RX ADMIN — POTASSIUM CHLORIDE 10 MEQ: 2 INJECTION, SOLUTION, CONCENTRATE INTRAVENOUS at 14:12

## 2018-02-03 RX ADMIN — OXYCODONE HYDROCHLORIDE 10 MG: 10 TABLET ORAL at 19:10

## 2018-02-03 RX ADMIN — DEXMEDETOMIDINE HYDROCHLORIDE 0.3 MCG/KG/HR: 100 INJECTION, SOLUTION INTRAVENOUS at 23:27

## 2018-02-03 ASSESSMENT — PATIENT HEALTH QUESTIONNAIRE - PHQ9
SUM OF ALL RESPONSES TO PHQ9 QUESTIONS 1 AND 2: 0
SUM OF ALL RESPONSES TO PHQ QUESTIONS 1-9: 0
1. LITTLE INTEREST OR PLEASURE IN DOING THINGS: NOT AT ALL
2. FEELING DOWN, DEPRESSED, IRRITABLE, OR HOPELESS: NOT AT ALL

## 2018-02-03 ASSESSMENT — PAIN SCALES - GENERAL
PAINLEVEL_OUTOF10: ASSUMED PAIN PRESENT
PAINLEVEL_OUTOF10: 3
PAINLEVEL_OUTOF10: 0
PAINLEVEL_OUTOF10: 7
PAINLEVEL_OUTOF10: ASSUMED PAIN PRESENT
PAINLEVEL_OUTOF10: 0
PAINLEVEL_OUTOF10: 0
PAINLEVEL_OUTOF10: 2
PAINLEVEL_OUTOF10: 8

## 2018-02-03 ASSESSMENT — PULMONARY FUNCTION TESTS: FVC: 1.3

## 2018-02-03 NOTE — CONSULTS
Cardiology Consult    Name Anthony Reyes 1960   Age/Sex 57 y.o. male   MRN [id number]   Code Status      Reason for consult  Epigastric pain    Chief Complaint:  Epigastric Pain    HPI:  Mr Anthony Reyes is a 57 year old male who presents with epigastric pain. Pain has been on and off for one month and is worse with eating and relieved somewhat by hot showers. Pain was present last night after eating. He presently has this pain and it is 1/10 but after eating and with exertion it is severe. Pain is relieved by rest and hot showers. He had epigastric pain radiating to arms and neck while at work recently that started when he was lifting heavy objects that was alleviated by rest. The patient has no medical history to speak of and does not take any medications. He does not take aspirin or see a cardiologist. He denies syncope, palpitations, dyspnea, lower extremity edema, orthopnea or PND.     Review of Systems   Constitutional: Negative for chills and fever.   Cardiovascular: Positive for chest pain. Negative for palpitations.   Gastrointestinal: Positive for abdominal pain and heartburn. Negative for nausea and vomiting.   Genitourinary: Negative for dysuria.   Neurological: Negative for dizziness and headaches.   Psychiatric/Behavioral: Negative for depression.     Past Medical History:   History reviewed. No pertinent past medical history.    Past Surgical History:  History reviewed. No pertinent surgical history.    Current Outpatient Medications:  Home Medications     Reviewed by Chantell Zambrano, Pharmacy Intern (Pharmacy Intern) on 18 at 1540  Med List Status: Complete   Medication Last Dose Status   omeprazole (PRILOSEC) 40 MG delayed-release capsule 2018 Active              Medication Allergy/Sensitivities:  No Known Allergies    Family History:  History reviewed. No pertinent family history.    Social History:  Social History     Social History   • Marital status:      Spouse  "name: N/A   • Number of children: N/A   • Years of education: N/A     Occupational History   • Not on file.     Social History Main Topics   • Smoking status: Never Smoker   • Smokeless tobacco: Never Used   • Alcohol use Not on file   • Drug use: Unknown   • Sexual activity: Not on file     Other Topics Concern   • Not on file     Social History Narrative   • No narrative on file       Physical Exam     Vitals:    18 1500 18 1505 18 1531   BP:  125/89    Pulse:  73 74   Resp:  18 18   Temp:  36.8 °C (98.3 °F)    SpO2:   99%   Weight: 93.4 kg (206 lb)     Height: 1.88 m (6' 2\")       Body mass index is 26.45 kg/m².  /89   Pulse 74   Temp 36.8 °C (98.3 °F)   Resp 18   Ht 1.88 m (6' 2\")   Wt 93.4 kg (206 lb)   SpO2 99%   BMI 26.45 kg/m²   O2 therapy: Pulse Oximetry: 99 %    Physical Exam   Constitutional: He is oriented to person, place, and time and well-developed, well-nourished, and in no distress. No distress.   HENT:   Head: Normocephalic and atraumatic.   Cardiovascular: Normal rate and regular rhythm.    No murmur heard.  Pulmonary/Chest: No respiratory distress.   Abdominal: He exhibits no distension.   Musculoskeletal: He exhibits no edema.   Neurological: He is alert and oriented to person, place, and time.   Skin: Skin is warm and dry. He is not diaphoretic.       Data Review       Lab Data Review:  Recent Results (from the past 24 hour(s))   EKG (NOW)    Collection Time: 18  3:04 PM   Result Value Ref Range    Report       [facility] Emergency Dept.    Test Date:  2018  Pt Name:    SALLIE FLYNN                  Department: ER  MRN:        [id number]                      Room:       RD 11  Gender:     Male                         Technician: [id number]  :        1960                   Requested By:ER TRIAGE PROTOCOL  Order #:    [id number]                    Reading MD:    Measurements  Intervals                                Axis  Rate:       " 79                           P:          32  IA:         132                          QRS:        116  QRSD:       102                          T:          57  QT:         408  QTc:        468    Interpretive Statements  SINUS RHYTHM  PROBABLE RVH W/ SECONDARY REPOL ABNORMALITY  No previous ECG available for comparison     CBC WITH DIFFERENTIAL    Collection Time: 02/02/18  3:15 PM   Result Value Ref Range    WBC 8.8 4.8 - 10.8 K/uL    RBC 4.55 (L) 4.70 - 6.10 M/uL    Hemoglobin 15.5 14.0 - 18.0 g/dL    Hematocrit 43.7 42.0 - 52.0 %    MCV 96.0 81.4 - 97.8 fL    MCH 34.1 (H) 27.0 - 33.0 pg    MCHC 35.5 (H) 33.7 - 35.3 g/dL    RDW 41.6 35.9 - 50.0 fL    Platelet Count 196 164 - 446 K/uL    MPV 11.0 9.0 - 12.9 fL    Neutrophils-Polys 77.00 (H) 44.00 - 72.00 %    Lymphocytes 14.20 (L) 22.00 - 41.00 %    Monocytes 7.30 0.00 - 13.40 %    Eosinophils 0.80 0.00 - 6.90 %    Basophils 0.50 0.00 - 1.80 %    Immature Granulocytes 0.20 0.00 - 0.90 %    Nucleated RBC 0.00 /100 WBC    Neutrophils (Absolute) 6.80 1.82 - 7.42 K/uL    Lymphs (Absolute) 1.25 1.00 - 4.80 K/uL    Monos (Absolute) 0.64 0.00 - 0.85 K/uL    Eos (Absolute) 0.07 0.00 - 0.51 K/uL    Baso (Absolute) 0.04 0.00 - 0.12 K/uL    Immature Granulocytes (abs) 0.02 0.00 - 0.11 K/uL    NRBC (Absolute) 0.00 K/uL   COMP METABOLIC PANEL    Collection Time: 02/02/18  3:15 PM   Result Value Ref Range    Sodium 140 135 - 145 mmol/L    Potassium 3.7 3.6 - 5.5 mmol/L    Chloride 108 96 - 112 mmol/L    Co2 22 20 - 33 mmol/L    Anion Gap 10.0 0.0 - 11.9    Glucose 102 (H) 65 - 99 mg/dL    Bun 14 8 - 22 mg/dL    Creatinine 0.88 0.50 - 1.40 mg/dL    Calcium 9.1 8.5 - 10.5 mg/dL    AST(SGOT) 34 12 - 45 U/L    ALT(SGPT) 21 2 - 50 U/L    Alkaline Phosphatase 57 30 - 99 U/L    Total Bilirubin 0.9 0.1 - 1.5 mg/dL    Albumin 4.1 3.2 - 4.9 g/dL    Total Protein 6.4 6.0 - 8.2 g/dL    Globulin 2.3 1.9 - 3.5 g/dL    A-G Ratio 1.8 g/dL   LIPASE    Collection Time: 02/02/18  3:15 PM   Result  Value Ref Range    Lipase 11 11 - 82 U/L   TROPONIN    Collection Time: 02/02/18  3:15 PM   Result Value Ref Range    Troponin I 9.58 (H) 0.00 - 0.04 ng/mL   BTYPE NATRIURETIC PEPTIDE    Collection Time: 02/02/18  3:15 PM   Result Value Ref Range    B Natriuretic Peptide 259 (H) 0 - 100 pg/mL   PROTHROMBIN TIME (INR)    Collection Time: 02/02/18  3:15 PM   Result Value Ref Range    PT 13.2 12.0 - 14.6 sec    INR 1.03 0.87 - 1.13   APTT    Collection Time: 02/02/18  3:15 PM   Result Value Ref Range    APTT 29.7 24.7 - 36.0 sec   ESTIMATED GFR    Collection Time: 02/02/18  3:15 PM   Result Value Ref Range    GFR If African American >60 >60 mL/min/1.73 m 2    GFR If Non African American >60 >60 mL/min/1.73 m 2       DX-CHEST-PORTABLE (1 VIEW)    (Results Pending)             Assessment/Plan     1. NSTEMI: Exertional chest pain alleviated by rest with significant troponin elevation and TWI in V3  2. Elevated BNP  3. Elevated troponin    This is a 57 year old male with typical angina, ischemic changes on ECG and a rising troponin. ECG has improved this afternoon since his transfer from [place] and symptoms have subsided, presently at 1/10. He is hemodynamically stable. He had aspirin and is on heparin. Titrate metoprolol to HR 50-60 unless he develops signs of heart failure and keep him NPO.     Plan  - Continue weight based UFH  - Metoprolol tartrate 25mg PO every eight hours  - Daily ASA   - Statin  - Oxygen, morphine, nitro PRN   - Titrate to HR 50-60  - NPO now    Quality Measures    Reviewed items::  Labs reviewed and Medications reviewed

## 2018-02-03 NOTE — OP REPORT
DATE OF SERVICE:  02/03/2018    REFERRING PHYSICIAN:  Ty Solomon MD    PREOPERATIVE DIAGNOSES:  Acute non-ST elevation myocardial infarction,   coronary artery disease, acute on chronic left ventricular systolic and   diastolic failure, severe ischemic cardiomyopathy.    POSTOPERATIVE DIAGNOSES:  Acute non-ST elevation myocardial infarction,   coronary artery disease, acute on chronic left ventricular systolic and   diastolic failure, severe ischemic cardiomyopathy.    PROCEDURES PERFORMED:  Coronary artery bypass grafting x3 (left internal   mammary artery to the left anterior descending artery and reverse saphenous   vein grafts to the diagonal artery and distal left circumflex artery),   right endoscopic vein harvest, and intraoperative transesophageal   echocardiography.    SURGEON:  Fredrick Burger MD    FIRST ASSISTANT:  HENRY Deras    ANESTHESIOLOGIST:  Yonas Vanegas MD    ANESTHESIA:  General endotracheal.    DRAINS:  Mediastinal chest tubes x2 (32-Maltese straight and angled).    COMPLICATIONS:  None.    INDICATIONS:  The patient is a pleasant 57-year-old male with an acute non-ST   elevation myocardial infarction.  Cardiac catheterization showed 2-vessel   coronary artery disease.  His left ventricular ejection fraction was   approximately 30-40%.  Echocardiography showed severely depressed left   ventricular ejection fraction of approximately 20%.  There were no valvular   abnormalities.    DESCRIPTION OF PROCEDURE:  The patient was brought to the operating room and   placed on the operating room table in the supine position.  After successful   induction of general anesthesia and endotracheal intubation, he was prepped   and draped in the usual sterile fashion.  HENRY Deras, assisted with   retraction during the operation, harvested 2 lengths of the right greater   saphenous vein endoscopically and closed the sternal wound.  Intraoperative   transesophageal echocardiography showed  moderate mitral regurgitation and   moderately depressed left ventricular ejection fraction of approximately 40%.    An incision was made from the sternal notch to the xiphoid.  The sternum was   opened longitudinally with a sternal saw.  Hemostasis was obtained with   electrocautery at the sternal edges.  The left internal mammary artery was   then dissected out in the usual fashion.  It had a 2 mm luminal diameter, thin   vessel wall, and excellent flow.  Two lengths of the right greater saphenous   vein were then dissected out in the usual fashion using the endoscopic method.    The vein had moderately large luminal diameter and normal vessel wall.  The   small leg incision was closed in 2 layers using Vicryl sutures.  The patient   was systemically heparinized.  Papaverine was instilled in the left internal   mammary artery lumen.  The pericardium was opened longitudinally and tented   anteriorly with Ethibond stay stitches.  The aortic cannula was inserted first   followed by the dual-stage venous cannula.  An antegrade cardioplegia cannula   was placed in the ascending aorta.  Cardiopulmonary bypass was instituted.    The aorta was cross-clamped and the patient was given 900 mL of cold   cardioplegia in an antegrade fashion.  There was prompt cardiac arrest.  Ice   slush was placed on the heart for further myocardial protection.  A phrenic   nerve protector pad was used.  From this point on, cardioplegia was given in   an antegrade fashion and also through the newly placed vein grafts every 15-20   minutes while the aorta was crossclamped.  The distal left circumflex artery   had a 2 mm luminal diameter, thin vessel wall and was free of plaques.  The    diagonal artery had a 1.75 mm luminal diameter and had few plaques.  The   left anterior descending artery had a distal 1.5 mm luminal diameter and had   multiple plaques and calcifications.  Distal anastomoses were then performed   between the reverse  saphenous vein grafts and the distal left circumflex   artery and diagonal artery.  An end-to-side anastomosis was performed   between the left internal mammary artery and the left anterior descending   artery.  Rewarming of the patient was initiated.  The aortic cross-clamp was   removed.  The aortic cross-clamp time was 52 minutes.  Total cardiopulmonary   bypass time was 80 minutes.  A partial occlusion clamp was placed in the   ascending aorta and a proximal anastomosis was performed between the aorta and   the saphenous vein graft to the left circumflex artery.  The vein graft to   the first diagonal artery was anastomosed in an end-to-side fashion to who   occluded of the vein graft to the left circumflex artery.  An AC  was   placed in the proximal anastomosis.  A straight and an angled 32-Maori chest   tubes were placed in mediastinum.  There was spontaneous conversion into sinus   rhythm.  When he was adequately warmed, he was slowly taken off   cardiopulmonary bypass, which he tolerated well.  The dual-stage venous   cannula was removed.  Protamine was given to reverse the effects of heparin.    The aortic cannula was removed.  When adequate hemostasis had been obtained,   the sternum was reapproximated using size 5 sternal wires and the remainder of   the incision was closed in 3 layers using Vicryl sutures.  Intraoperative   transesophageal echocardiography showed moderate mitral regurgitation and   unchanged left ventricular ejection fraction of approximately 40%.  There were   no apparent complications.  The patient tolerated the procedure well and left   the operating room in guarded condition.       ____________________________________     MD RUFINO AGARWAL / ANTOINETTE    DD:  02/03/2018 12:51:37  DT:  02/03/2018 13:27:26    D#:  [id number]  Job#:  [id number]

## 2018-02-03 NOTE — ED NOTES
Lab called with critical Troponin result of 9.58.  Dr. Horn notified of critical lab result at 1635.

## 2018-02-03 NOTE — CONSULTS
DATE OF SERVICE:  02/03/2018    REFERRING PHYSICIAN:  Ty Solomon MD    REASON FOR CONSULTATION:  Consideration for coronary artery bypass grafting.    CHIEF COMPLAINT:  Chest pain.    HISTORY OF PRESENT ILLNESS:  The patient is a very pleasant 57-year-old male,   who presented complaining of chest pain.  He has had intermittent chest pain   over the past few weeks.  Yesterday, he developed chest pain radiating to his   jaw and arms.  He presented to the emergency room where he was found to have a   troponin elevation of 5.5, which subsequently increased to 6.06.  He denies   any nausea, vomiting, diaphoresis, fever, or syncope.  He was admitted with   the diagnosis of non-ST elevation myocardial infarction.  Cardiac   catheterization showed 3-vessel coronary artery disease.  There was a complex   disease in the left anterior descending artery and distal stenosis in the left   circumflex artery.  Echocardiography did not show any valvular disease.  His   left ventricular ejection fraction was approximately 20%.    PAST MEDICAL HISTORY:  Unremarkable.    ALLERGIES:  No known drug allergies.    MEDICATIONS:  Prilosec 40 mg p.o. p.r.n.    FAMILY HISTORY:  Negative for premature coronary artery disease.    PSYCHOSOCIAL HISTORY:  He does not smoke and does not use alcohol.    REVIEW OF SYSTEMS:  NEUROLOGIC:  There is no history of strokes or transient ischemic attacks.  RESPIRATORY:  Shortness of breath with exertion.  CARDIAC:  As per history of present illness.  All the rest of the systems was   reviewed with the patient and was negative.    PHYSICAL EXAMINATION:  GENERAL:  Well-developed, well-nourished 57-year-old white male in no acute   distress.  He is alert and oriented x3.  VITAL SIGNS:  Blood pressure is 116/74, heart rate 70 and regular,   respirations 18, height 6 feet 2 inches, weight 204 pounds.  NECK:  Supple without jugular venous distention.  There is no cervical   lymphadenopathy.  HEENT:   Normocephalic, atraumatic.  Extraocular muscles intact. His nasal and   oral mucosa are pink and moist.  SKIN:  Normal.  RESPIRATORY:  Lungs are clear to auscultation bilaterally.  CARDIAC:  Regular rate and rhythm with no obvious murmur.  ABDOMEN:  Soft, nondistended, nontender with bowel sounds present.  EXTREMITIES:  There is no acute hepatosplenomegaly and no palpable masses.  EXTREMITIES:  There is no clubbing, cyanosis, or edema.  VASCULAR:  There are good peripheral pulses bilaterally.  MUSCULOSKELETAL:  Normal range of motion.  NEUROLOGIC:  Grossly intact.    IMPRESSION:  Acute non-ST elevation myocardial infarction, acute on chronic left   ventricular systolic and diastolic failure, severe ischemic cardiomyopathy.    PLAN:  I recommend that he undergo coronary artery bypass grafting and   intraoperative transesophageal echocardiography.  Risks, benefits, potential   complications and alternative treatments were discussed with the patient in   detail including his risks should he decide not to undergo my recommended   treatment.  All of his questions were answered to his satisfaction and he is   willing to proceed with the operation.  Risks include death, stroke, bleeding,   infection, perioperative myocardial infarction, dysrhythmias, organ failure,   possible return to the operating room for bleeding, drug or transfusion   reactions.  His operative mortality risk is 2% to 3%.  The operation will be   performed this morning 0/03/2018 at 9:00 a.m.  Findings and recommendations   were discussed with Dr. Solomon.    Thank you for this challenging consultation and participation in the patient's   care.       ____________________________________     MD RUFINO AGARWAL / ANTOINETTE    DD:  02/03/2018 09:05:25  DT:  02/03/2018 09:23:11    D#:  [id number]  Job#:  [id number]

## 2018-02-03 NOTE — H&P
DATE OF ADMISSION:  02/02/2018    PRIMARY CARE PHYSICIAN:  None.    CHIEF COMPLAINT:  Chest pain.    HISTORY OF PRESENT ILLNESS:  Patient is a pleasant 57-year-old male without   significant past medical history, evaluated here at [facility] with complaints of   chest pain.  Patient reportedly has had intermittent chest pain over the past   month.  He has had over the past couple of weeks increasing dyspnea with   exertion and chest discomfort.  He had yesterday chest pain radiating to his   jaw and arms, was seen at urgent care and referred to [facility] where he   had findings of troponin of 5.5, increased to 6.06.  With findings of acute   coronary syndrome, transferred to [facility] where he has had emergent   cardiac catheterization revealing 2-vessel disease with complex proximal 95%   and 90% bifurcation disease at the mid circumflex artery with systolic   dysfunction, EF 41%.  With findings, patient admitted acute to ICU where he   will be continued on medication optimization with antiplatelets, beta-blocker,   statin and IV heparin drip.  Dr. Burger, cardiothoracic surgery has been   contacted to evaluate for bypass surgery.    REVIEW OF SYSTEMS:  As above.  Otherwise, all other according to AMA and CMS   criteria.    PAST MEDICAL HISTORY:  None reported.    PAST SURGICAL HISTORY:  Right ankle fracture ORIF 3-4 years ago at [place].    MEDICATIONS:  Prilosec 40 mg as needed.    ALLERGIES:  No known drug allergies.    SOCIAL HISTORY:  No tobacco or alcohol, .    FAMILY HISTORY:  No reported heart disease.    PHYSICAL EXAMINATION:  CURRENT VITAL SIGNS:  Temperature 36.8, pulse 70s-60s, respiratory rate was   18, 99% on room air, blood pressure 125//72, 93 kg.  GENERAL:  The patient was alert, appropriately oriented.  No apparent   distress.  HEENT:  Anicteric.  Extraocular movements are intact.  Mucous membranes were   moist.  NECK:  No cervical or supraclavicular adenopathy.   Trachea midline.  No JVD.  CARDIOVASCULAR:  Regular rate and rhythm.  No murmurs.  LUNGS:  Clear to auscultation bilaterally.  CHEST:  Normal chest wall excursion effort without chest wall tenderness.  ABDOMEN:  Bowel sounds present, soft, nontender, nondistended.  No   hepatosplenomegaly or pulsatile masses.  BACK:  No CVA or paraspinal tenderness.  EXTREMITIES:  There is no lower extremity edema.  Negative Homans sign,   symmetric, generalized 5/5 strength, 2+ symmetric radial pulses.  SKIN:  Warm, dry without pallor.  NEUROLOGIC:  Cranial nerves grossly intact.  No focal weakness.  PSYCHIATRIC:  Calm, cooperative without depressed affect.    LABORATORY DATA:  White count 8.8, hemoglobin 15.8, platelet count 196,000.    BUN and creatinine 14 and 0.8, blood sugar 102.  Troponin 9.5, .  INR   1.03.  Chest x-ray revealed no acute cardiopulmonary process.    Patient's EKG, my interpretation reveals sinus rhythm, rate approximately 79,   T-wave inversion in V2.    IMPRESSION AND PLAN:  1.  Non-ST elevation myocardial infarction post cardiac catheterization with   multivessel disease.  Plant to continue with aspirin, statin, beta-blocker   therapy, IV heparin drip.  Plan evaluation for bypass surgery per Dr. Burger.  2.  Acute systolic dysfunction.  We will monitor fluid status.  With his   multivessel disease, plan bypass surgery.    Acute inpatient admission greater than 2-midnight stay anticipated.       ____________________________________     MD SANTO WRIGHT / ANTOINETTE    DD:  02/02/2018 22:56:10  DT:  02/02/2018 23:28:42    D#:  [id number]  Job#:  [id number]

## 2018-02-03 NOTE — ED PROVIDER NOTES
ED Provider Note    CHIEF COMPLAINT  Chief Complaint   Patient presents with   • Chest Pain   • Abnormal Labs       HPI  Anthony Reyes is a 57 y.o. male who presents to the emergency department transferred from another hospital because of an elevated troponin. The patient tells me that he's been having 3 months of intermittent symptoms of epigastric pain radiates into the chest and into both arms and into his back. Symptoms are worse after eating so he has been thinking that this might be acid reflux. He doesn't recognize any other precipitating factors and he says taking a hot shower frequently helps to alleviate his symptoms. The symptoms have been getting worse so today he went to his local emergency department where he had an evaluation done which showed an elevated troponin and the patient was transferred to this hospital please see chart REVIEW below.    REVIEW OF SYSTEMS no hemoptysis no fever or chills no other abdominal pain. All other systems negative    PAST MEDICAL HISTORY  History reviewed. No pertinent past medical history.    FAMILY HISTORY  History reviewed. No pertinent family history.    SOCIAL HISTORY  Social History     Social History   • Marital status:      Spouse name: N/A   • Number of children: N/A   • Years of education: N/A     Social History Main Topics   • Smoking status: Never Smoker   • Smokeless tobacco: Never Used   • Alcohol use Not on file   • Drug use: Unknown   • Sexual activity: Not on file     Other Topics Concern   • Not on file     Social History Narrative   • No narrative on file       SURGICAL HISTORY  History reviewed. No pertinent surgical history.    CURRENT MEDICATIONS  Home Medications     Reviewed by Chantell Zambrano, Pharmacy Intern (Pharmacy Intern) on 02/02/18 at 1540  Med List Status: Complete   Medication Last Dose Status   omeprazole (PRILOSEC) 40 MG delayed-release capsule 2/2/2018 Active                ALLERGIES  No Known Allergies    PHYSICAL  "EXAM  VITAL SIGNS: /89   Pulse 79   Temp 36.8 °C (98.3 °F)   Resp 18   Ht 1.88 m (6' 2\")   Wt 93.4 kg (206 lb)   SpO2 98%   BMI 26.45 kg/m²    Oxygen saturation is interpreted as adequate  Constitutional: Awake and nontoxic appearing and the patient says he has currently symptom free  HENT: Mucous membranes are moist  Eyes: No erythema or discharge or jaundice  Neck: Trachea midline no JVD  Cardiovascular: Regular rate and rhythm  Lungs: Clear and equal bilaterally  Abdomen/Back: Soft nontender nondistended no rebound or guarding or peritoneal findings there is no right upper quadrant tenderness or Mejias sign is no epigastric tenderness with deep palpation.  Skin: Warm and dry  Musculoskeletal: No leg edema or calf tenderness  Neurologic: Awake verbal moving all extremities without difficulty    CHART REVIEW  I reviewed the records from the [facility] hospital and this included EKGs which are reviewed in EKG interpretation below. The patient had laboratory testing with a CBC showing white blood count of 9.8 and hemoglobin of 16.7 INR is normal at 1.0 basic metabolic panels unremarkable LFTs were unremarkable urinalysis was negative for nitrite and leukocyte esterase and blood the patient has an elevated total cholesterol 208 and an elevated LDL of 143. The initial troponin was done at 10:35 AM and was elevated at 6.06 and there is another troponin value listed at 12:25 AM with a value of 5.5. The patient was treated with aspirin and started on a nitroglycerin drip. He had a chest x-ray which was unremarkable.    Laboratory  Here in the emergency department CBC shows white blood count 8.8 hemoglobin and hematocrit 15.5 complete metabolic panels unremarkable troponin is elevated at 9.58 BNP is 259 INR is 1.03    EKG interpretation  I reviewed the patient's EKGs obtained prior to arrival and at 11:05 AM this morning the patient had a 12-lead EKG showing sinus rhythm 65 bpm there is trace ST elevation " and abnormal T waves noted in lead 1 and aVL and 1-2 mm of ST elevation in lead V2 but isolated to this lead. There were deep T-wave inversions in lead V1 and V2 and V3. These findings are consistent with acute ischemia.    A repeat EKG was done here in the emergency department is a 12-lead EKG showing sinus rhythm 79 bpm there were nonspecific ST and T-wave changes but no diagnostic ST elevation there was T-wave inversion in V1 and V2 and V3. There is an S wave in lead 1.    Radiology  DX-CHEST-PORTABLE (1 VIEW)   Final Result      No acute cardiac or pulmonary abnormality is noted.      ECHOCARDIOGRAM COMP W/O CONT    (Results Pending)   Carotid Duplex STAT    (Results Pending)   Bilateral Saphenous Vein Mapping (LE Vein Mapping) STAT- Map whole leg    (Results Pending)         MEDICAL DECISION MAKING and DISPOSITION  In the emergency department an IV was established and the patient was placed on a cardiac monitor we discontinued his nitroglycerin drip and he had no recurrence of pain. I reviewed the case with cardiology who has recommended starting a heparin drip and will provide consultation and I reviewed the case with the hospitalist and the patient is admitted to the hospitalist service for further evaluation and treatment    IMPRESSION  1. Myocardial infarction      Electronically signed by: Duke Horn, 2/2/2018 7:27 PM

## 2018-02-03 NOTE — CARE PLAN
Problem: Pre Op  Goal: Optimal preparation for CABG/Heart Valve surgery  Outcome: PROGRESSING AS EXPECTED    Intervention: Pre Op education to patient/significant other. Provide patient [facility] Patient Guideline for Cardiac Surgery (See Pt. Ed.)  Educated patient on surgery and what will be expected after the surgery.  Intervention: Consents obtained for Surgery, Anesthesia and Transfusion/Bloodless Program Participant  Consents printed and sent down with patient. Waiting anesthesia and surgeon to speak with patient before consents are signed.  Intervention: Pre op checklist completed. Admit profile completed. Advanced directive verified.  Completed pre-op checklist and admit profile.  Intervention: Pre op labs per MD order: CBC, CMP, INR, PTT, COD if not done in past 72 hours.  HbA1C if diabetic.  Completed. All labs sent down to lab.  Intervention: Pre op diagnostics per MD order (EKG, CXR, Bilateral carotid doppler study and vein mapping)  All diagnostics completed per MD order.  Intervention: Baseline assessment documented to include IS volume, weight, bilateral BP and peripheral pulses.  Baseline IS 3000; Weight 92.6kg; Bilateral BP Left Arm 112/76 with a MAP of 82 and Right Arm 111/73 with a MAP of 81; All Peripheral Pulses +2.  Intervention: NPO at midnight except cardiac medications. (No ASA, coumadin or Plavix)  Patient has been NPO since midnight.  Intervention: Shower with chlorhexidine x 2  2 CHG baths have been completed.   Intervention: Prep with clippers  Chest, Groin and Bilateral Legs clipped.   Intervention: Remove dentures, valuables and jewelry  All Jewelry and valuables off of patient.  Intervention: Determine if patient is taking Beta Blockers  Patient has Metoprolol ordered per EPIC.  Intervention: Cardiac/BP meds and Mupirocin ointment given prior to surgery. Verify orders for hold or administer of anticoags.  Meds given prior to surgery.  Intervention: If diabetic, administer insulin night  before surgery  Patient is not diabetic.  Intervention: If diabetic, FSBS in am and follow sliding scale if indicated  Patient is not diabetic.  Intervention: Pre op antibiotics sent to surgery with patient per MD order (surgery to administer)  Antibiotics sent with patient to surgery.   Intervention: Medical record sent to surgery with current H&P  Patient chart sent with patient to surgery.  Intervention: Transport to surgery with cardiac monitor and oxygen  Patient is on a transport monitor to surgery.

## 2018-02-03 NOTE — PROCEDURES
DATE OF PROCEDURE:  02/02/2018    PROCEDURE:  Cardiac catheterization.  A. Left heart catheterization.  B. Left ventriculography.  C. Selective coronary angiography.  D. Right radial artery approach.    PREPROCEDURE DIAGNOSES:  1.  Non-ST elevation myocardial infarction.  2.  Post myocardial infarction, unstable angina pectoris with dynamic ischemic   ST-T wave abnormalities.    POSTPROCEDURE DIAGNOSES:  1.  Coronary artery disease, 2-vessel with complex proximal left anterior descending  artery serial 95% and 90% stenoses including bifurcation disease involving the diagonal  branch and the distal circumflex artery.  2.  Left ventricular ejection fraction 41% with severe hypokinesis and akinesis of the  anterior wall and anterior apical motta.     PHYSICIAN:  Ty Solomon MD    REFERRING PHYSICIAN:  Bryon Barfield MD    COMPLICATIONS:  None.    MEDICATIONS:  1.  Heparin intravenous infusion.  2.  Versed 4 mg IV.  3.  Fentanyl 100 mcg IV.  4.  Lidocaine 2% subcutaneous.  5.  Nitro-Dur patch 0.4 mg.    INDICATIONS: The patient is a 57-year-old male who was transferred from [facility] in [place], [place] to [facility] for management  of an acute non-ST elevation myocardial infarction with post myocardial infarction angina   pectoris with dynamic ischemic ST-T wave abnormalities.  The patient is referred for an urgent cardiac catheterization with a consideration of therapeutic   coronary intervention.    PROCEDURE IN DETAIL:  After an informed consent was obtained, the patient was   brought to the cardiac catheterization laboratory.    The patient was prepped, draped and anesthetized in usual manner.    After establishing the presence of adequate collateral circulation to the   right hand with a pressure and oximetry-guided Shabbir's test, the volar surface   of the right wrist was prepped, draped and anesthetized in usual manner.    Using modified Seldinger technique, a  6-[place]awk x 10 cm introducer sheath was   inserted in the right radial artery.  Nitroglycerin and verapamil were given   via the side port.     Next, a 4-[place] JL 3.5 left coronary catheter was inserted in the ostium of   the left coronary artery and left coronary angiograms were obtained in various   projections.    Subsequent catheter exchanges were done over an exchange length J-tipped 0.035   wire.    Next, a 4-[place]awk JR 4.0 right coronary catheter was inserted in the ostium of   the right coronary artery and right coronary angiograms were obtained in   various projections.    Next, 4-Mohawk pigtail catheter was inserted in left ventricle under fluoroscopic   guidance.  A single plane MARTINEZ left ventricular angiogram was performed.  Pre   and post angiogram LVEDP, LV and aortic pressures were obtained.    Next, a 6-[place] JL 3.5 left coronary catheter was inserted and additional   left coronary angiograms were obtained.    At the end of the procedure, catheters were removed.  Hemostasis was achieved   with a wrist band device. The patient tolerated the procedure well.    FINDINGS:  HEMODYNAMICS:    A. Left heart pressures.    1.  LVEDP of 41 mmHg.  2.  Left ventricular systolic pressure 142 mmHg.  3.  Central aortic pressure systolic 135, diastolic 91, mean of 112 mmHg.    LEFT VENTRICULOGRAPHY:  Left ventricular chamber size is normal with severe   hypokinesis to akinesis of the distal two-thirds of the anterior wall and   anterior apex with normal contractility of the inferior wall and basal segment of the   anterior wall.  Calculated ejection fraction 41%.  No identifiable mitral   regurgitation or left ventricular thrombus.    CORONARY ANGIOGRAPHY:  1.  Left main artery:  Left main vessel is moderately large caliber vessel   that is angiographically normal and bifurcates into the left anterior descending artery   and circumflex artery.  2.  Left anterior descending artery:  The LAD gives rise simultaneously to  a first septal   branch and a first diagonal branch, a normal complement of septal branches and then   extends around the apex.    The proximal left anterior descending artery has diffuse dense calcification with diffuse   luminal narrowing including a proximal 95% stenosis near the origin followed by a 90%   proximal stenosis involving the first diagonal branch bifurcation.  The mid left anterior   descending artery has a long 40% stenosis. The distal left anterior descending artery   has a 60% stenosis.  3.  Circumflex artery:  The circumflex gives rise to 2 small caliber marginal   branches and a large third marginal branch.  The distal circumflex has a 75%   stenosis.  4.  Right coronary artery:  The right coronary artery gives rise to a long right ventricular   branch, a posterior descending artery and a short posterolateral branch.  The right coronary   artery is widely patent with diffuse, mild, noncritical, smooth atheromatous disease with   fragmentary collateral circulation toward the left anterior descending artery via septal branches.    PLAN:  1.  Admit to CCU.  2.  Continue comprehensive anti-ischemic therapy including IV heparin infusion.  3.  Cardiothoracic surgical consultation for coronary bypass surgery.  I have   spoken with Dr. Fredrick Burger, cardiac surgeon who has been notified of the   patient's cardiac condition and the need for consultation for coronary bypass   surgery.       ____________________________________     MD ROGER MELISSA / ANTOINETTE    DD:  02/02/2018 19:19:30  DT:  02/02/2018 19:51:44    D#:  [id number]  Job#:  [id number]

## 2018-02-03 NOTE — PROGRESS NOTES
Pt returned from OR at 1300. Bedside monitor applied; updates received from Dr. Vanegas. Pt lines and gtts verified. Vitals attained and initial assessment completed.

## 2018-02-04 ENCOUNTER — APPOINTMENT (OUTPATIENT)
Dept: RADIOLOGY | Facility: MEDICAL CENTER | Age: 58
DRG: 233 | End: 2018-02-04
Attending: NURSE PRACTITIONER
Payer: MEDICARE

## 2018-02-04 LAB
ANION GAP SERPL CALC-SCNC: 7 MMOL/L (ref 0–11.9)
BUN SERPL-MCNC: 11 MG/DL (ref 8–22)
CALCIUM SERPL-MCNC: 7.8 MG/DL (ref 8.5–10.5)
CHLORIDE SERPL-SCNC: 110 MMOL/L (ref 96–112)
CO2 SERPL-SCNC: 23 MMOL/L (ref 20–33)
CREAT SERPL-MCNC: 0.84 MG/DL (ref 0.5–1.4)
EKG IMPRESSION: NORMAL
ERYTHROCYTE [DISTWIDTH] IN BLOOD BY AUTOMATED COUNT: 42.4 FL (ref 35.9–50)
GLUCOSE BLD-MCNC: 102 MG/DL (ref 65–99)
GLUCOSE BLD-MCNC: 105 MG/DL (ref 65–99)
GLUCOSE BLD-MCNC: 115 MG/DL (ref 65–99)
GLUCOSE BLD-MCNC: 121 MG/DL (ref 65–99)
GLUCOSE BLD-MCNC: 79 MG/DL (ref 65–99)
GLUCOSE BLD-MCNC: 81 MG/DL (ref 65–99)
GLUCOSE BLD-MCNC: 82 MG/DL (ref 65–99)
GLUCOSE BLD-MCNC: 84 MG/DL (ref 65–99)
GLUCOSE BLD-MCNC: 84 MG/DL (ref 65–99)
GLUCOSE BLD-MCNC: 85 MG/DL (ref 65–99)
GLUCOSE BLD-MCNC: 86 MG/DL (ref 65–99)
GLUCOSE BLD-MCNC: 87 MG/DL (ref 65–99)
GLUCOSE BLD-MCNC: 98 MG/DL (ref 65–99)
GLUCOSE BLD-MCNC: 98 MG/DL (ref 65–99)
GLUCOSE SERPL-MCNC: 75 MG/DL (ref 65–99)
HCT VFR BLD AUTO: 32.1 % (ref 42–52)
HGB BLD-MCNC: 11.3 G/DL (ref 14–18)
MCH RBC QN AUTO: 34.3 PG (ref 27–33)
MCHC RBC AUTO-ENTMCNC: 35.2 G/DL (ref 33.7–35.3)
MCV RBC AUTO: 97.6 FL (ref 81.4–97.8)
PLATELET # BLD AUTO: 157 K/UL (ref 164–446)
PMV BLD AUTO: 11 FL (ref 9–12.9)
POTASSIUM SERPL-SCNC: 3.7 MMOL/L (ref 3.6–5.5)
POTASSIUM SERPL-SCNC: 4.1 MMOL/L (ref 3.6–5.5)
RBC # BLD AUTO: 3.29 M/UL (ref 4.7–6.1)
SODIUM SERPL-SCNC: 140 MMOL/L (ref 135–145)
WBC # BLD AUTO: 16 K/UL (ref 4.8–10.8)

## 2018-02-04 PROCEDURE — 94668 MNPJ CHEST WALL SBSQ: CPT

## 2018-02-04 PROCEDURE — A9270 NON-COVERED ITEM OR SERVICE: HCPCS | Performed by: HOSPITALIST

## 2018-02-04 PROCEDURE — 82962 GLUCOSE BLOOD TEST: CPT | Mod: 91

## 2018-02-04 PROCEDURE — A9270 NON-COVERED ITEM OR SERVICE: HCPCS | Performed by: NURSE PRACTITIONER

## 2018-02-04 PROCEDURE — 700111 HCHG RX REV CODE 636 W/ 250 OVERRIDE (IP): Performed by: NURSE PRACTITIONER

## 2018-02-04 PROCEDURE — 93010 ELECTROCARDIOGRAM REPORT: CPT | Performed by: INTERNAL MEDICINE

## 2018-02-04 PROCEDURE — 85027 COMPLETE CBC AUTOMATED: CPT

## 2018-02-04 PROCEDURE — 700112 HCHG RX REV CODE 229: Performed by: NURSE PRACTITIONER

## 2018-02-04 PROCEDURE — 80048 BASIC METABOLIC PNL TOTAL CA: CPT

## 2018-02-04 PROCEDURE — 700102 HCHG RX REV CODE 250 W/ 637 OVERRIDE(OP): Performed by: HOSPITALIST

## 2018-02-04 PROCEDURE — 84132 ASSAY OF SERUM POTASSIUM: CPT

## 2018-02-04 PROCEDURE — 71045 X-RAY EXAM CHEST 1 VIEW: CPT

## 2018-02-04 PROCEDURE — 700105 HCHG RX REV CODE 258: Performed by: NURSE PRACTITIONER

## 2018-02-04 PROCEDURE — 94667 MNPJ CHEST WALL 1ST: CPT

## 2018-02-04 PROCEDURE — 770020 HCHG ROOM/CARE - TELE (206)

## 2018-02-04 PROCEDURE — 700102 HCHG RX REV CODE 250 W/ 637 OVERRIDE(OP): Performed by: NURSE PRACTITIONER

## 2018-02-04 PROCEDURE — 93005 ELECTROCARDIOGRAM TRACING: CPT | Performed by: NURSE PRACTITIONER

## 2018-02-04 RX ORDER — POTASSIUM CHLORIDE 7.45 MG/ML
10 INJECTION INTRAVENOUS ONCE
Status: COMPLETED | OUTPATIENT
Start: 2018-02-04 | End: 2018-02-04

## 2018-02-04 RX ADMIN — CLOPIDOGREL 75 MG: 75 TABLET, FILM COATED ORAL at 08:28

## 2018-02-04 RX ADMIN — POTASSIUM CHLORIDE 20 MEQ: 2 INJECTION, SOLUTION, CONCENTRATE INTRAVENOUS at 01:24

## 2018-02-04 RX ADMIN — OXYCODONE HYDROCHLORIDE 10 MG: 10 TABLET ORAL at 07:04

## 2018-02-04 RX ADMIN — ENOXAPARIN SODIUM 40 MG: 100 INJECTION SUBCUTANEOUS at 21:56

## 2018-02-04 RX ADMIN — DOCUSATE SODIUM 100 MG: 100 CAPSULE ORAL at 08:28

## 2018-02-04 RX ADMIN — TRAMADOL HYDROCHLORIDE 50 MG: 50 TABLET, COATED ORAL at 08:31

## 2018-02-04 RX ADMIN — OXYCODONE HYDROCHLORIDE 10 MG: 10 TABLET ORAL at 21:57

## 2018-02-04 RX ADMIN — TRAMADOL HYDROCHLORIDE 50 MG: 50 TABLET, COATED ORAL at 12:25

## 2018-02-04 RX ADMIN — OXYCODONE HYDROCHLORIDE 5 MG: 10 TABLET ORAL at 10:04

## 2018-02-04 RX ADMIN — ASPIRIN 81 MG: 81 TABLET, COATED ORAL at 08:28

## 2018-02-04 RX ADMIN — ATORVASTATIN CALCIUM 80 MG: 80 TABLET, FILM COATED ORAL at 21:57

## 2018-02-04 RX ADMIN — MAGNESIUM SULFATE HEPTAHYDRATE 1 G: 1 INJECTION, SOLUTION INTRAVENOUS at 12:30

## 2018-02-04 RX ADMIN — OXYCODONE HYDROCHLORIDE 10 MG: 10 TABLET ORAL at 03:13

## 2018-02-04 RX ADMIN — OXYCODONE HYDROCHLORIDE 5 MG: 10 TABLET ORAL at 13:01

## 2018-02-04 RX ADMIN — INSULIN HUMAN 8 UNITS: 100 INJECTION, SUSPENSION SUBCUTANEOUS at 22:06

## 2018-02-04 RX ADMIN — SODIUM CHLORIDE 1 UNITS/HR: 9 INJECTION, SOLUTION INTRAVENOUS at 08:15

## 2018-02-04 RX ADMIN — STANDARDIZED SENNA CONCENTRATE AND DOCUSATE SODIUM 1 TABLET: 8.6; 5 TABLET, FILM COATED ORAL at 21:57

## 2018-02-04 RX ADMIN — METOPROLOL TARTRATE 12.5 MG: 25 TABLET, FILM COATED ORAL at 08:28

## 2018-02-04 RX ADMIN — POTASSIUM CHLORIDE 10 MEQ: 7.46 INJECTION, SOLUTION INTRAVENOUS at 08:27

## 2018-02-04 RX ADMIN — TRAMADOL HYDROCHLORIDE 50 MG: 50 TABLET, COATED ORAL at 21:56

## 2018-02-04 RX ADMIN — METOPROLOL TARTRATE 12.5 MG: 25 TABLET, FILM COATED ORAL at 21:56

## 2018-02-04 RX ADMIN — OXYCODONE HYDROCHLORIDE 10 MG: 10 TABLET ORAL at 17:46

## 2018-02-04 ASSESSMENT — PAIN SCALES - GENERAL
PAINLEVEL_OUTOF10: 3
PAINLEVEL_OUTOF10: 3
PAINLEVEL_OUTOF10: 7
PAINLEVEL_OUTOF10: 2
PAINLEVEL_OUTOF10: 4
PAINLEVEL_OUTOF10: 4
PAINLEVEL_OUTOF10: 7
PAINLEVEL_OUTOF10: 8
PAINLEVEL_OUTOF10: 7
PAINLEVEL_OUTOF10: 3
PAINLEVEL_OUTOF10: 4
PAINLEVEL_OUTOF10: 0
PAINLEVEL_OUTOF10: 3
PAINLEVEL_OUTOF10: 2
PAINLEVEL_OUTOF10: 5
PAINLEVEL_OUTOF10: 0
PAINLEVEL_OUTOF10: 4
PAINLEVEL_OUTOF10: 3

## 2018-02-04 ASSESSMENT — ENCOUNTER SYMPTOMS
RESPIRATORY NEGATIVE: 1
NEUROLOGICAL NEGATIVE: 1
CARDIOVASCULAR NEGATIVE: 1
MUSCULOSKELETAL NEGATIVE: 1
EYES NEGATIVE: 1
PSYCHIATRIC NEGATIVE: 1
GASTROINTESTINAL NEGATIVE: 1
CONSTITUTIONAL NEGATIVE: 1

## 2018-02-04 NOTE — CARE PLAN
Problem: Day of surgery post CABG/Heart valve replacement  Goal: Stabilization in immediate post op period    Intervention: VS q 15 min x 4 hours, then q 1 hour. Include temperature immediately upon arrival. Check CO/CI q 2-4 hours and PRN  Vitals verified in EPIC  Intervention: Serum K q 6 hours x 24 hours.  ABG and CBC prn.  Potassium replaced every 6 hours as needed per K scale to 4.5  Intervention: FSBS frequency as per Cardiac Surgery Insulin Drip Protocol  Blood sugar checked every 1-2 hours, insulin gtt titrated as needed per protocol 979  Intervention: For patients on Beta Blockers: verify dose given prior to surgery or within 6 hours after arrival to the unit  Pt not on home beta blockers.  Metoprolol to start POD 1  Intervention: Chest tube to 20 cm suction, record CT drainage with VS  Chest tubes maintained at 20 cm suction, drainage recorded at least hourly  Intervention: Titrate and wean off vasoactive drips per patient's condition and per MD order while maintaining SBP  mmHg per MD order  BP has been maintained without vasoactive gtts  Intervention: IS q 1 hour while awake post extubation  Pt reluctant to perform IS due to pain.  Able to reach 1500 ml after Oxycodone  Intervention: Out of bed, dangle 4 hours post extubation  Dangled 5 minutes, pt reluctant to move in anticipation of pain  Intervention: Maintain all original surgical dressings for 24 hours  Dressings intact  Intervention: Clear liquids post extubation, advance as tolerated  Tolerating ice chips and water, will advance for breakfast  Intervention: Discontinue [place] mitchel and arterial line 12-18 hours post op if hemodynamically stable and off vasoactive drips  Will remove lines closer to morning  Intervention: A-Fib and DVT prophylaxis per MD order or contraindications documented (refer to DVT/VTE problem on Care Plan)  SCD's in use bilaterally, ALEXI hose ordered for morning

## 2018-02-04 NOTE — RESPIRATORY CARE
Extubation    Cuff leak noted yes   Stridor present no       Patient toleration yes  RCP Complete? yes  Events/Summary/Plan: extubation (02/03/18 6720)

## 2018-02-04 NOTE — PROGRESS NOTES
Pt extubated at 1604 from % PEEP 8 FiO2 40% per MD order; see 1558 ABG. NIF -32, FVC 1.3L, RR 18, Pinsp 5, SpO2 99%.    Total intubation time from room arrival: 3 hours 4 minutes.

## 2018-02-04 NOTE — CARE PLAN
Problem: Day of surgery post CABG/Heart valve replacement  Goal: Stabilization in immediate post op period    Intervention: VS q 15 min x 4 hours, then q 1 hour. Include temperature immediately upon arrival. Check CO/CI q 2-4 hours and PRN  Completed.  Intervention: If radial artery used, elevate arm, no BP checks or needle sticks from affected arm, monitor ulnar pulse and capillary refill  Done.  Intervention: First post op hour labs and diagnostics per MD order  Completed.  Intervention: Serum K q 6 hours x 24 hours.  ABG and CBC prn.  Done.  Intervention: For FSBS greater than 130, start Post Cardiac Surgery Insulin Drip Protocol  Done.  Intervention: FSBS frequency as per Cardiac Surgery Insulin Drip Protocol  Done.  Intervention: For patients on Beta Blockers: verify dose given prior to surgery or within 6 hours after arrival to the unit  Dose given night prior to surgery.  Intervention: Chest tube to 20 cm suction, record CT drainage with VS  Done.   Intervention: For CT drainage > 300 cc in first post op hour and/or 150 cc in subsequent hours: platelets, coag screen, fibrinogen, H&H per order  Not applicable.  Intervention: Titrate and wean off vasoactive drips per patient's condition and per MD order while maintaining SBP  mmHg per MD order  Done.  Intervention: VAP protocol in place  Done.  Intervention: Wean from vent per protocol (see protocol), extubation goal with 2-6 hours post op.  Done. Patient extubated after 3 hours 4 minutes.  Intervention: IS q 1 hour while awake post extubation  Done. Best volume 750 mL.  Intervention: Bedrest until extubated and groin lines out  Done.  Intervention: Out of bed, dangle 4 hours post extubation  Not yet completed. Will be done with noc shift.  Intervention: Up in chair 4 hours, day of extubation  Not yet completed.  Intervention: Maintain all original surgical dressings for 24 hours  In place.  Intervention: Clear liquids post extubation, advance as  tolerated  Done. Patient tolerating sips of water and ice chips.  Intervention: Discontinue [place] mitchel and arterial line 12-18 hours post op if hemodynamically stable and off vasoactive drips  Not yet completed.  Intervention: A-Fib and DVT prophylaxis per MD order or contraindications documented (refer to DVT/VTE problem on Care Plan)  SCDs in place.  Intervention: Amiodarone protocol per MD order  Not applicable.

## 2018-02-04 NOTE — PROGRESS NOTES
Monitor summary:  SR 70-90  .16/.08/.38    Shift summary:  Labile BP when pt sleeping, Epi gtt off and on throughout the night.  Pain controlled with Oxycodone.  Pt anxious and reluctant to increase diet and activity for fear of pre-op-like pain recurring.  Explained the need for protein for healing.  Pt remains reluctant.

## 2018-02-04 NOTE — CARE PLAN
Problem: Oxygenation:  Goal: Maintain adequate oxygenation dependent on patient condition  Outcome: MET Date Met: 02/03/18  Pt is on 4lpm

## 2018-02-04 NOTE — PROGRESS NOTES
Cardiovascular Surgery Progress Note    Name: Anthony Reyes  MRN: [id number]  : 1960  Admit Date: 2018  2:56 PM  Procedure:  Procedure(s) and Anesthesia Type:     * MULTIPLE CORONARY ARTERY BYPASS X3, ENDO VEIN HARVEST RIGHT LOWER LEG - General     * PB - General  2 Day Post-Op    Vitals:  Patient Vitals for the past 8 hrs:   Temp Monitored Temp SpO2 O2 (LPM) Pulse Heart Rate (Monitored) Resp NIBP Weight O2 (FiO2)   18 0800 37.2 °C (99 °F) - - - - - - - - -   18 0733 - - 95 % 0 79 79 16 - - 21   18 0700 - - 94 % - 86 85 17 102/69 - -   18 0600 - - 92 % 0 78 78 13 110/59 - -   18 0500 - 36.7 °C (98.1 °F) 98 % - 80 77 (!) 22 (!) 93/70 97.3 kg (214 lb 8.1 oz) -   18 0400 36.6 °C (97.9 °F) 36.6 °C (97.9 °F) 95 % - 72 72 15 (!) 86/54 - -   18 0345 - 36.6 °C (97.9 °F) 94 % - 78 78 13 - - -   18 0330 - 36.6 °C (97.9 °F) 95 % - 71 71 (!) 10 - - -   18 0315 - 36.6 °C (97.9 °F) 93 % - 82 82 (!) 28 - - -   18 0300 - 36.6 °C (97.9 °F) 95 % - 76 76 16 (!) 83/49 - -   18 0245 - 36.7 °C (98.1 °F) 96 % - 77 77 (!) 11 - - -   18 0230 - 36.7 °C (98.1 °F) 97 % - 72 73 (!) 6 - - -   18 0200 36.9 °C (98.4 °F) 36.7 °C (98.1 °F) 95 % - 72 72 12 - - -   18 0130 - 36.7 °C (98.1 °F) 96 % - 72 72 (!) 7 - - -   18 0115 - 36.9 °C (98.4 °F) 95 % - 81 82 (!) 9 - - -     Temp (24hrs), Av.9 °C (98.5 °F), Min:36.1 °C (97 °F), Max:37.3 °C (99.1 °F)      Respiratory:  Romero Vent Mode: ASV, PEEP/CPAP: 8, FiO2: 40, P Peak (PIP): 14, P MEAN: 14 Respiration: 16, Pulse Oximetry: 95 %, O2 Daily Delivery Respiratory : Room Air with O2 Available  Chest Tube Group 1 (A) Mediastinal Straight 32-Tube Status / Drainage: Patent;Sutured in Place;Sanguinous, Chest Tube Group 2 (B) Mediastinal Angled 32-Tube Status / Drainage: Patent;Sutured in Place;Sanguinous, Chest Tube Group 1 (A) Mediastinal Straight 32-Device: Dry Closed Drainage System;Suction 20 cm Water,  Chest Tube Group 2 (B) Mediastinal Angled 32-Device: Dry Closed Drainage System;Suction 20 cm Water  Chest Tube Drains:     Mediastinal Chest Tube 1: 10 ml    Fluids:    Intake/Output Summary (Last 24 hours) at 02/04/18 0907  Last data filed at 02/04/18 0800   Gross per 24 hour   Intake          7964.11 ml   Output             7397 ml   Net           567.11 ml     Admit weight: Weight: 93.4 kg (206 lb)  Current weight: Weight: 97.3 kg (214 lb 8.1 oz) (02/04/18 0500)    Labs:  Recent Labs      02/02/18   1515  02/02/18   2309  02/03/18   1300  02/04/18   0600   WBC  8.8  9.4   --   16.0*   RBC  4.55*  4.52*   --   3.29*   HEMOGLOBIN  15.5  15.4   --   11.3*   HEMATOCRIT  43.7  43.9   --   32.1*   MCV  96.0  97.1   --   97.6   MCH  34.1*  34.1*   --   34.3*   MCHC  35.5*  35.1   --   35.2   RDW  41.6  42.6   --   42.4   PLATELETCT  196  193  140*  157*   MPV  11.0  11.1   --   11.0     Recent Labs      02/02/18   1515   NEUTSPOLYS  77.00*   LYMPHOCYTES  14.20*   MONOCYTES  7.30   EOSINOPHILS  0.80   BASOPHILS  0.50     Recent Labs      02/02/18   1515  02/02/18   2309  02/03/18   1755  02/04/18   0001  02/04/18   0600   SODIUM  140  139   --    --   140   POTASSIUM  3.7  3.6  3.8  3.7  4.1   CHLORIDE  108  108   --    --   110   CO2  22  23   --    --   23   GLUCOSE  102*  96   --    --   75   BUN  14  12   --    --   11   CREATININE  0.88  0.81   --    --   0.84   CALCIUM  9.1  8.4*   --    --   7.8*     Recent Labs      02/02/18   1515  02/02/18   2309  02/03/18   1300   APTT  29.7  53.0*  31.9   INR  1.03  1.02  1.21*       Medications:  • insulin lispro  4 Units     • insulin NPH  8 Units     • insulin lispro  6 Units     • insulin lispro  0-15 Units     • potassium chloride (KCL-CENTRAL) IV *Administer in ICU only*  10 mEq     • docusate sodium  100 mg      And   • senna-docusate  1 Tab     • enoxaparin  40 mg     • magnesium sulfate  1 g Stopped (02/03/18 1530)   • metoprolol  12.5 mg      Followed by   • [START  ON 2/5/2018] metoprolol  25 mg     • aspirin EC  81 mg     • clopidogrel  75 mg     • insulin regular  0-14 Units     • atorvastatin  80 mg         Exam:   Review of Systems   Constitutional: Negative.    HENT: Negative.    Eyes: Negative.    Respiratory: Negative.    Cardiovascular: Negative.    Gastrointestinal: Negative.    Genitourinary: Negative.    Musculoskeletal: Negative.    Skin: Negative.    Neurological: Negative.    Endo/Heme/Allergies: Negative.    Psychiatric/Behavioral: Negative.        Physical Exam   Constitutional: He is oriented to person, place, and time. He appears well-developed and well-nourished. No distress.   Neck: Neck supple.   Cardiovascular: Normal rate, regular rhythm and normal heart sounds.  Exam reveals no gallop and no friction rub.    No murmur heard.  Pulmonary/Chest: Effort normal. No respiratory distress. He has decreased breath sounds in the right lower field and the left lower field. He has no wheezes. He has no rales.   Abdominal: Soft. He exhibits no distension. There is no tenderness.   Neurological: He is alert and oriented to person, place, and time.   Skin: Skin is warm and dry. He is not diaphoretic.       Quality Measures:     Reviewed items::  EKG reviewed, Labs reviewed, Medications reviewed and Radiology images reviewed  Bashir catheter::  One or Two Days Post Surgery (Day of Surgery being Day 0)  Central line in place:  Need for access  DVT prophylaxis pharmacological::  Enoxaparin (Lovenox) and Not indicated at this time, ambulatory  DVT prophylaxis - mechanical:  SCDs  Ulcer Prophylaxis::  Yes      Assessment/Plan:  POD 1 HDS, SR, d/c ct/michael, annabelle, enc IS    Patient seen, examined and plan reviewed with midlevel provider. I agree with the plan.      Active Hospital Problems    Diagnosis   • ACS (acute coronary syndrome) (CMS-HCC) [I24.9]

## 2018-02-04 NOTE — CARE PLAN
Problem: Hyperinflation:  Goal: Prevent or improve atelectasis    Intervention: Instruct incentive spirometry usage  1500ml on IS

## 2018-02-04 NOTE — CONSULTS
Pulmonary & Critical Care Consult Note    DATE OF CONSULTATION:  2/4/2018     REFERRING PHYSICIAN:  Spike Linares M.D.      CONSULTANT:  Ba Cordova DO     REASON FOR CONSULTATION:  Post-operative critical care assist     HISTORY OF PRESENT ILLNESS:  57 yom with no pmhx who presented to the emergency department with several weeks of intermittent chest pain. On the day of admission the patient's chest pain was persistent and radiating to the jaws arm. In ER is found to have a troponin of 5.5 and was admitted for a NSTEMI. A cardiac catheterization performed by Dr. Fish on 2/2/18 demonstrated 90 and 95% stenosis of the left anterior descending artery involving diagonal and distal circumflex artery, an echo showed a severely reduced left ventricular systolic function. Estimated LVEF 20%. Global hypokinesis with regional variation. Apical akinesis. The inferior wall motion is mostly preserved. He was taken for a CABG x3, LIMA to LAD and RSVG to diagonal and distal left circumflex. Post-operatively he was brought to the cardiac intensive care unit on a mechanical ventilator and extubated per protocol.     PAST MEDICAL HISTORY:  History reviewed. No pertinent past medical history.     PAST SURGICAL HISTORY:  Past Surgical History:   Procedure Laterality Date   • ANKLE ORIF Right         ALLERGIES:   Patient has no known allergies.     MEDICATIONS PRIOR TO ADMISSION:  No current facility-administered medications on file prior to encounter.      No current outpatient prescriptions on file prior to encounter.       SOCIAL HISTORY:   Social History     Social History   • Marital status:      Spouse name: N/A   • Number of children: N/A   • Years of education: N/A     Occupational History   • Not on file.     Social History Main Topics   • Smoking status: Never Smoker   • Smokeless tobacco: Never Used   • Alcohol use Not on file   • Drug use: Unknown   • Sexual activity: Not on file     Other Topics Concern  "  • Not on file     Social History Narrative   • No narrative on file       FAMILY HISTORY:  History reviewed. No pertinent family history.     REVIEW OF SYSTEMS:  Constitutional: No fever, no chills,  Respiratory: No cough, no hemoptysis, +dyspnea  Cardiovascular: +chest pain, no palpitations, no orthopnea, no PND, no lower extremity swelling  GI: No nausea, no vomiting, no abdominal pain, no diarrhea, no constipation, no hematochezia, no melena  : no dysuria, no frequency, no urgency  Endocrine: No polyuria, no polydipsia, no hair loss  Hematology: No easy bruising, no bleeding  Musculoskeletal: No joint swelling, no joint erythema, no arthralgia, no myalgias  Neuro: No seizures, no numbness, no tingling sensation, no extremity weakness, no change in vision.  Psychiatry: no depression, no suicidal ideation     PHYSICAL EXAMINATION:  /74   Pulse 79   Temp 37.2 °C (99 °F)   Resp 16   Ht 1.88 m (6' 2\")   Wt 97.3 kg (214 lb 8.1 oz)   SpO2 95%   BMI 27.54 kg/m²   GENERAL: Well-nourished, well-developed, age-appropriate appearing male, in no acute distress  HEENT: Normocephalic, atraumatic, PERRL, EOMI, external ears and nose normal. Moist mucus membranes.  NECK: Trachea midline, supple, no JVD  PULM: Clear to auscultation bilaterally, no rales, no rhonchi, no wheeze. 2 Chest Tubes in place, vac over sternotomy incision  CVS: Regular rate and rhythm, no murmur  ABDOMEN: Soft, nontender, nondistended  EXTREMITIES: right lower extremity dressing clean dry and intact  SKIN: Warm, pink, dry  NEURO: Alert and oriented ×4, nonfocal    LABORATORY DATA:    Lab Results   Component Value Date/Time    WBC 16.0 (H) 02/04/2018 06:00 AM    RBC 3.29 (L) 02/04/2018 06:00 AM    HEMOGLOBIN 11.3 (L) 02/04/2018 06:00 AM    HEMATOCRIT 32.1 (L) 02/04/2018 06:00 AM    MCV 97.6 02/04/2018 06:00 AM    MCH 34.3 (H) 02/04/2018 06:00 AM    MCHC 35.2 02/04/2018 06:00 AM    MPV 11.0 02/04/2018 06:00 AM    NEUTSPOLYS 77.00 (H) " 02/02/2018 03:15 PM    LYMPHOCYTES 14.20 (L) 02/02/2018 03:15 PM    MONOCYTES 7.30 02/02/2018 03:15 PM    EOSINOPHILS 0.80 02/02/2018 03:15 PM    BASOPHILS 0.50 02/02/2018 03:15 PM      Lab Results   Component Value Date/Time    SODIUM 140 02/04/2018 06:00 AM    POTASSIUM 4.1 02/04/2018 06:00 AM    CHLORIDE 110 02/04/2018 06:00 AM    CO2 23 02/04/2018 06:00 AM    GLUCOSE 75 02/04/2018 06:00 AM    BUN 11 02/04/2018 06:00 AM    CREATININE 0.84 02/04/2018 06:00 AM      Lab Results   Component Value Date/Time    PROTHROMBTM 15.0 (H) 02/03/2018 01:00 PM    INR 1.21 (H) 02/03/2018 01:00 PM         IMAGING:   CXR (personally reviewed)  DX-CHEST-PORTABLE (1 VIEW)   Final Result      Improving pulmonary edema and atelectasis.      TRANSESOPHAGEAL ECHO W/O CONT         DX-CHEST-PORTABLE (1 VIEW)   Final Result      1.  Support devices as described above.      2.  Surgical change.      3.  Bilateral pulmonary infiltrates.      4.  Cardiomegaly.      Carotid Duplex STAT   Final Result      ECHOCARDIOGRAM-COMP W/ CONT   Final Result      Bilateral Saphenous Vein Mapping (LE Vein Mapping) STAT- Map whole leg   Final Result      DX-CHEST-PORTABLE (1 VIEW)   Final Result      No acute cardiac or pulmonary abnormality is noted.        ASSESSMENT/PLAN:  Postoperative from coronary artery bypass grafting   - POD #1   - Weaned from inotropic support, pulmonary artery catheter removed   - Chest tube management per CTS   - IS, ambulation   - ASA, Plavix    Postoperative respiratory management   - RT/O2 Protocols   - Titrate supplemental FiO2 to maintain SpO2 >88%   - Encourage atelectatic therapies    Hypertension   - Beta blocker    Coronary artery disease/NSTEMI   - Postop day one CABG   - ASA, Plavix, beta blocker    Leukocytosis   - Likely reactive, monitor    Prophylaxis: lovenox    Thank you for asking me to consult on the patient.  I appreciate the opportunity to assist in their care and will follow along closely with  you.    Ba Cordova, DO  Critical Care Medicine    This dictation was created using voice recognition software. The accuracy of the dictation is limited to the abilities of the software. Errors of grammar and possibly content are to be expected.

## 2018-02-04 NOTE — DIETARY
"Nutrition Services: Consult for Poor Po/ Wt Loss PTA     History reviewed. No pertinent past medical history.  Ht: 6' 2\"  Wt: 92.6 kg (204#) via stand up scale  BMI: 26.2 (overweight classification)   Diet: Cardiac; Consistent Carbohydrate     56 y/o male admitted r/t acute coronary syndrome found to have NSTEMI, CAD, acute on chronic L ventricular systolic and diastolic HF, and severe ischemic cardiomyopathy. Pt seen today at bedside to assess nutrition status. Pt reports his appetite has been poor since January 10th due to fatigue and pain, per pt. Pt reported a UBW of 215-220# indicating the pt's weight is stable. Pt reports his appetite is good now. Noted pt was receiving CLD which has been advanced this am. Pt reports he is tolerating diet well. Added snacks BID per pt preferences. Also provided heart healthy diet education at visit, which pt was very receptive to. Pt declined any further questions or concerns at this time. RD following for PO adequacy.     Pertinent Labs: Reviewed  Pertinent Meds: Colace, Senokot, Insulin, Mag Sulfate, Lopressor. PRN Roxicodone  Fluids: NS @ 10 mL/hr   Skin: Surgical incisions  GI: Last BM PTA      PLAN/RECOMMEND -   1) Nutrition rep to see patient daily for meal and snack preferences.  2) Encourage PO of meals and snacks  3) Weekly weights to monitor fluid and nutrition status  4) Obtain supplement order per RD as needed.    RD following    "

## 2018-02-05 LAB
ACT BLD: 120 SEC (ref 74–137)
ACT BLD: 742 SEC (ref 74–137)
ACTION RANGE TRIGGERED IACRT: NO
ACTION RANGE TRIGGERED IACRT: YES
ANION GAP SERPL CALC-SCNC: 8 MMOL/L (ref 0–11.9)
BASE EXCESS BLDA CALC-SCNC: -2 MMOL/L (ref -4–3)
BASE EXCESS BLDA CALC-SCNC: -2 MMOL/L (ref -4–3)
BASE EXCESS BLDA CALC-SCNC: 0 MMOL/L (ref -4–3)
BASE EXCESS BLDV CALC-SCNC: -3 MMOL/L (ref -4–3)
BODY TEMPERATURE: ABNORMAL DEGREES
BUN SERPL-MCNC: 15 MG/DL (ref 8–22)
CA-I BLD ISE-SCNC: 1.03 MMOL/L (ref 1.1–1.3)
CA-I BLD ISE-SCNC: 1.05 MMOL/L (ref 1.1–1.3)
CA-I BLD ISE-SCNC: 1.08 MMOL/L (ref 1.1–1.3)
CA-I BLD ISE-SCNC: 1.1 MMOL/L (ref 1.1–1.3)
CALCIUM SERPL-MCNC: 8.3 MG/DL (ref 8.5–10.5)
CHLORIDE SERPL-SCNC: 105 MMOL/L (ref 96–112)
CO2 BLDA-SCNC: 24 MMOL/L (ref 20–33)
CO2 BLDA-SCNC: 24 MMOL/L (ref 20–33)
CO2 BLDA-SCNC: 25 MMOL/L (ref 20–33)
CO2 BLDV-SCNC: 24 MMOL/L (ref 20–33)
CO2 SERPL-SCNC: 22 MMOL/L (ref 20–33)
CREAT SERPL-MCNC: 1.02 MG/DL (ref 0.5–1.4)
ERYTHROCYTE [DISTWIDTH] IN BLOOD BY AUTOMATED COUNT: 43.8 FL (ref 35.9–50)
GLUCOSE BLD-MCNC: 110 MG/DL (ref 65–99)
GLUCOSE BLD-MCNC: 117 MG/DL (ref 65–99)
GLUCOSE BLD-MCNC: 119 MG/DL (ref 65–99)
GLUCOSE BLD-MCNC: 121 MG/DL (ref 65–99)
GLUCOSE BLD-MCNC: 123 MG/DL (ref 65–99)
GLUCOSE BLD-MCNC: 137 MG/DL (ref 65–99)
GLUCOSE BLD-MCNC: 138 MG/DL (ref 65–99)
GLUCOSE SERPL-MCNC: 120 MG/DL (ref 65–99)
HCO3 BLDA-SCNC: 22.6 MMOL/L (ref 17–25)
HCO3 BLDA-SCNC: 23.2 MMOL/L (ref 17–25)
HCO3 BLDA-SCNC: 24.1 MMOL/L (ref 17–25)
HCO3 BLDV-SCNC: 22.6 MMOL/L (ref 24–28)
HCT VFR BLD AUTO: 34.3 % (ref 42–52)
HCT VFR BLD CALC: 28 % (ref 42–52)
HCT VFR BLD CALC: 28 % (ref 42–52)
HCT VFR BLD CALC: 29 % (ref 42–52)
HCT VFR BLD CALC: 30 % (ref 42–52)
HGB BLD-MCNC: 10.2 G/DL (ref 14–18)
HGB BLD-MCNC: 11.9 G/DL (ref 14–18)
HGB BLD-MCNC: 9.5 G/DL (ref 14–18)
HGB BLD-MCNC: 9.5 G/DL (ref 14–18)
HGB BLD-MCNC: 9.9 G/DL (ref 14–18)
INST. QUALIFIED PATIENT IIQPT: YES
MCH RBC QN AUTO: 34.3 PG (ref 27–33)
MCHC RBC AUTO-ENTMCNC: 34.7 G/DL (ref 33.7–35.3)
MCV RBC AUTO: 98.8 FL (ref 81.4–97.8)
O2/TOTAL GAS SETTING VFR VENT: 100 %
O2/TOTAL GAS SETTING VFR VENT: 100 %
O2/TOTAL GAS SETTING VFR VENT: 80 %
O2/TOTAL GAS SETTING VFR VENT: 80 %
PCO2 BLDA: 34.4 MMHG (ref 26–37)
PCO2 BLDA: 37.7 MMHG (ref 26–37)
PCO2 BLDA: 41.9 MMHG (ref 26–37)
PCO2 BLDV: 40.2 MMHG (ref 41–51)
PH BLDA: 7.35 [PH] (ref 7.4–7.5)
PH BLDA: 7.41 [PH] (ref 7.4–7.5)
PH BLDA: 7.42 [PH] (ref 7.4–7.5)
PH BLDV: 7.36 [PH] (ref 7.31–7.45)
PLATELET # BLD AUTO: 182 K/UL (ref 164–446)
PMV BLD AUTO: 11.3 FL (ref 9–12.9)
PO2 BLDA: 196 MMHG (ref 64–87)
PO2 BLDA: 237 MMHG (ref 64–87)
PO2 BLDA: 96 MMHG (ref 64–87)
PO2 BLDV: 49 MMHG (ref 25–40)
POTASSIUM BLD-SCNC: 4.1 MMOL/L (ref 3.6–5.5)
POTASSIUM BLD-SCNC: 4.4 MMOL/L (ref 3.6–5.5)
POTASSIUM BLD-SCNC: 4.6 MMOL/L (ref 3.6–5.5)
POTASSIUM BLD-SCNC: 4.6 MMOL/L (ref 3.6–5.5)
POTASSIUM SERPL-SCNC: 4 MMOL/L (ref 3.6–5.5)
RBC # BLD AUTO: 3.47 M/UL (ref 4.7–6.1)
SAO2 % BLDA: 100 % (ref 93–99)
SAO2 % BLDA: 100 % (ref 93–99)
SAO2 % BLDA: 97 % (ref 93–99)
SAO2 % BLDV: 83 %
SODIUM BLD-SCNC: 138 MMOL/L (ref 135–145)
SODIUM BLD-SCNC: 139 MMOL/L (ref 135–145)
SODIUM BLD-SCNC: 140 MMOL/L (ref 135–145)
SODIUM BLD-SCNC: 142 MMOL/L (ref 135–145)
SODIUM SERPL-SCNC: 135 MMOL/L (ref 135–145)
SPECIMEN DRAWN FROM PATIENT: ABNORMAL
WBC # BLD AUTO: 14.2 K/UL (ref 4.8–10.8)

## 2018-02-05 PROCEDURE — 97162 PT EVAL MOD COMPLEX 30 MIN: CPT

## 2018-02-05 PROCEDURE — 770020 HCHG ROOM/CARE - TELE (206)

## 2018-02-05 PROCEDURE — A9270 NON-COVERED ITEM OR SERVICE: HCPCS | Performed by: NURSE PRACTITIONER

## 2018-02-05 PROCEDURE — 80048 BASIC METABOLIC PNL TOTAL CA: CPT

## 2018-02-05 PROCEDURE — 700102 HCHG RX REV CODE 250 W/ 637 OVERRIDE(OP): Performed by: NURSE PRACTITIONER

## 2018-02-05 PROCEDURE — G8988 SELF CARE GOAL STATUS: HCPCS | Mod: CI

## 2018-02-05 PROCEDURE — 700111 HCHG RX REV CODE 636 W/ 250 OVERRIDE (IP): Performed by: NURSE PRACTITIONER

## 2018-02-05 PROCEDURE — 94668 MNPJ CHEST WALL SBSQ: CPT

## 2018-02-05 PROCEDURE — 82962 GLUCOSE BLOOD TEST: CPT

## 2018-02-05 PROCEDURE — G8978 MOBILITY CURRENT STATUS: HCPCS | Mod: CK

## 2018-02-05 PROCEDURE — A9270 NON-COVERED ITEM OR SERVICE: HCPCS | Performed by: HOSPITALIST

## 2018-02-05 PROCEDURE — G8979 MOBILITY GOAL STATUS: HCPCS | Mod: CI

## 2018-02-05 PROCEDURE — 85027 COMPLETE CBC AUTOMATED: CPT

## 2018-02-05 PROCEDURE — 700102 HCHG RX REV CODE 250 W/ 637 OVERRIDE(OP): Performed by: HOSPITALIST

## 2018-02-05 PROCEDURE — 97165 OT EVAL LOW COMPLEX 30 MIN: CPT

## 2018-02-05 PROCEDURE — 94669 MECHANICAL CHEST WALL OSCILL: CPT

## 2018-02-05 PROCEDURE — G8987 SELF CARE CURRENT STATUS: HCPCS | Mod: CJ

## 2018-02-05 PROCEDURE — 700112 HCHG RX REV CODE 229: Performed by: NURSE PRACTITIONER

## 2018-02-05 RX ORDER — FUROSEMIDE 20 MG/1
20 TABLET ORAL
Status: DISCONTINUED | OUTPATIENT
Start: 2018-02-05 | End: 2018-02-06

## 2018-02-05 RX ORDER — POTASSIUM CHLORIDE 20 MEQ/1
10 TABLET, EXTENDED RELEASE ORAL DAILY
Status: DISCONTINUED | OUTPATIENT
Start: 2018-02-05 | End: 2018-02-06

## 2018-02-05 RX ADMIN — OXYCODONE HYDROCHLORIDE 10 MG: 10 TABLET ORAL at 01:40

## 2018-02-05 RX ADMIN — OXYCODONE HYDROCHLORIDE 10 MG: 10 TABLET ORAL at 17:26

## 2018-02-05 RX ADMIN — FUROSEMIDE 20 MG: 20 TABLET ORAL at 09:03

## 2018-02-05 RX ADMIN — POTASSIUM CHLORIDE 10 MEQ: 1500 TABLET, EXTENDED RELEASE ORAL at 09:03

## 2018-02-05 RX ADMIN — METOPROLOL TARTRATE 25 MG: 25 TABLET, FILM COATED ORAL at 09:03

## 2018-02-05 RX ADMIN — MAGNESIUM SULFATE HEPTAHYDRATE 1 G: 1 INJECTION, SOLUTION INTRAVENOUS at 13:03

## 2018-02-05 RX ADMIN — CLOPIDOGREL 75 MG: 75 TABLET, FILM COATED ORAL at 09:01

## 2018-02-05 RX ADMIN — OXYCODONE HYDROCHLORIDE 10 MG: 10 TABLET ORAL at 09:30

## 2018-02-05 RX ADMIN — INSULIN HUMAN 8 UNITS: 100 INJECTION, SUSPENSION SUBCUTANEOUS at 04:54

## 2018-02-05 RX ADMIN — ASPIRIN 81 MG: 81 TABLET, COATED ORAL at 09:02

## 2018-02-05 RX ADMIN — ENOXAPARIN SODIUM 40 MG: 100 INJECTION SUBCUTANEOUS at 09:05

## 2018-02-05 RX ADMIN — OXYCODONE HYDROCHLORIDE 10 MG: 10 TABLET ORAL at 20:51

## 2018-02-05 RX ADMIN — STANDARDIZED SENNA CONCENTRATE AND DOCUSATE SODIUM 1 TABLET: 8.6; 5 TABLET, FILM COATED ORAL at 20:51

## 2018-02-05 RX ADMIN — OXYCODONE HYDROCHLORIDE 10 MG: 10 TABLET ORAL at 06:28

## 2018-02-05 RX ADMIN — DOCUSATE SODIUM 100 MG: 100 CAPSULE ORAL at 09:03

## 2018-02-05 RX ADMIN — OXYCODONE HYDROCHLORIDE 10 MG: 10 TABLET ORAL at 13:03

## 2018-02-05 RX ADMIN — METOPROLOL TARTRATE 25 MG: 25 TABLET, FILM COATED ORAL at 20:52

## 2018-02-05 RX ADMIN — ATORVASTATIN CALCIUM 80 MG: 80 TABLET, FILM COATED ORAL at 20:51

## 2018-02-05 ASSESSMENT — ENCOUNTER SYMPTOMS
PSYCHIATRIC NEGATIVE: 1
RESPIRATORY NEGATIVE: 1
GASTROINTESTINAL NEGATIVE: 1
MUSCULOSKELETAL NEGATIVE: 1
EYES NEGATIVE: 1
NEUROLOGICAL NEGATIVE: 1
CONSTITUTIONAL NEGATIVE: 1
CARDIOVASCULAR NEGATIVE: 1

## 2018-02-05 ASSESSMENT — COGNITIVE AND FUNCTIONAL STATUS - GENERAL
DRESSING REGULAR LOWER BODY CLOTHING: A LITTLE
SUGGESTED CMS G CODE MODIFIER DAILY ACTIVITY: CJ
MOBILITY SCORE: 15
TURNING FROM BACK TO SIDE WHILE IN FLAT BAD: A LOT
CLIMB 3 TO 5 STEPS WITH RAILING: A LITTLE
MOVING FROM LYING ON BACK TO SITTING ON SIDE OF FLAT BED: A LOT
STANDING UP FROM CHAIR USING ARMS: A LITTLE
WALKING IN HOSPITAL ROOM: A LITTLE
HELP NEEDED FOR BATHING: A LITTLE
MOVING TO AND FROM BED TO CHAIR: A LOT
DAILY ACTIVITIY SCORE: 22
SUGGESTED CMS G CODE MODIFIER MOBILITY: CK

## 2018-02-05 ASSESSMENT — PAIN SCALES - GENERAL
PAINLEVEL_OUTOF10: 5
PAINLEVEL_OUTOF10: 5
PAINLEVEL_OUTOF10: 4
PAINLEVEL_OUTOF10: 3
PAINLEVEL_OUTOF10: 6
PAINLEVEL_OUTOF10: 4
PAINLEVEL_OUTOF10: 0
PAINLEVEL_OUTOF10: 4
PAINLEVEL_OUTOF10: 4
PAINLEVEL_OUTOF10: 3
PAINLEVEL_OUTOF10: 5
PAINLEVEL_OUTOF10: 0
PAINLEVEL_OUTOF10: 5
PAINLEVEL_OUTOF10: 6
PAINLEVEL_OUTOF10: 0
PAINLEVEL_OUTOF10: 5
PAINLEVEL_OUTOF10: 5
PAINLEVEL_OUTOF10: 6

## 2018-02-05 ASSESSMENT — ACTIVITIES OF DAILY LIVING (ADL): TOILETING: INDEPENDENT

## 2018-02-05 ASSESSMENT — GAIT ASSESSMENTS
DISTANCE (FEET): 110
GAIT LEVEL OF ASSIST: CONTACT GUARD ASSIST

## 2018-02-05 NOTE — CARE PLAN
Problem: Post Op Day 1 CABG/Heart Valve Replacement  Goal: Optimal care of the post op CABG/heart valve replacement Post Op Day 1    Intervention: EKG and CXR completed  Completed.  Intervention: All valve patients: PT/INR daily  N/A.  Intervention: Antibiotics are discontinued within 24 hours of anesthesia end time unless indication documented for continuation beyond 24 hours  Done.  Intervention: Daily Weights  Done.  Intervention: Up in chair for all meals  Done.  Intervention: Ambulate in am if stable. First ambulation is 25 feet. Repeat x 3 as tolerated.  Ambulate again before bed.  Patient ambulated in room multiple times, > 25 feet.  Intervention: Discontinue rodriguez catheter unless documented reason for continuation  Done.  Intervention: Remove original surgical dressing after 24 hrs, leave open to air unless otherwise specified by physician  Done.  Intervention: Cardiac rehab phase 1 ordered and smoking cessation education and program referral as appropriate  Done.  Intervention: Consider chest tube removal by MD  Done.  Intervention: IS q 1 hour while awake and record best IS volume  Done. Best volume 1750 mL.  Intervention: Graduated elastic stockings  In place.  Intervention: Saline lock IV  Done.  Intervention: Transfer to [facility], begin VS q 4 hours  Done.  Intervention: After 24th hour post-anesthesia end time, transition patient to Cardiac Surgery SQ Insulin Protocol  Done.  Intervention: If patient is CABG or on home beta-blocker, start Beta-Blocker on POD 1 or POD 2 or contraindication documented  Done.

## 2018-02-05 NOTE — CARE PLAN
Problem: Safety  Goal: Will remain free from falls  Outcome: PROGRESSING AS EXPECTED    Intervention: Assess risk factors for falls  Risk factors assessed      Problem: Infection  Goal: Will remain free from infection  Outcome: PROGRESSING AS EXPECTED    Intervention: Assess signs and symptoms of infection  S/S assessed  Intervention: Implement standard precautions and perform hand washing before and after patient contact  Hand washing performed   Intervention: Give CDC handouts for infection prevention (infection prevention/hand washing, disease specific, and device specific) and document in Education  Pt educated orally   Intervention: Assess for removal of potential routes of infection, such as IV, central line, intra-arterial or urinary catheters  Routes assessed

## 2018-02-05 NOTE — PROGRESS NOTES
Pulmonary Critical Care Progress Note        Date of Service:  2/5/2018  Chief Complaint: Worsening intermittent chest pain    History of Present Illness: 57 y.o. male admitted on 2/2/2018 for NSTEMI undergoing cardiac catheterization demonstrating multivessel coronary artery disease who underwent coronary artery bypass grafting x 3 on 2/4/2018 who came to the ICU for post operative ventilator management     ROS: Has slight dyspnea after talking on the phone with a friend and minor chest discomfort over the surgical incision.  No loss of appetite, nausea, or vomiting    Interval Events:  24 hour interval history reviewed    - POD#2   - CABGx3   - A/Ox4   - ST 100s   - SBP 100s   - Cardiac diet   - No BM   - no Bashir and adequate UOP in urinal   - standby assist   - CXR(reviewed): clear lung fields    PFSH:  No change.    Respiratory:     Pulse Oximetry: 96 %  Chest Tube Drains:     Mediastinal Chest Tube 1: 0 ml (770 total drain output)    Exam: unlabored respirations, no intercostal retractions or accessory muscle use and clear to auscultation without rales or wheezes  ImagingAvailable data reviewed   Recent Labs      02/03/18   1411  02/03/18   1509  02/03/18   1558   ISTATAPH  7.424  7.398*  7.394*   ISTATAPCO2  35.9  35.4  35.0   ISTATAPO2  96*  93*  90*   ISTATATCO2  25  23  22   VUWUAPH4MZR  98  97  97   ISTATARTHCO3  23.5  21.8  21.4   ISTATARTBE  -1  -3  -3   ISTATTEMP  36.2 C  36.3 C  37.0 C   ISTATFIO2  60  50  40   ISTATSPEC  Arterial  Arterial  Arterial   ISTATAPHTC  7.436  7.408  7.394*   CEEILILX5YT  91*  89*  90*       HemoDynamics:  Pulse: 99, Heart Rate (Monitored): (!) 105  NIBP: 109/71      Exam: regular rate and rhythm  Imaging: Available data reviewed  Recent Labs      02/02/18   1515  02/02/18   2309   TROPONINI  9.58*  8.25*   BNPBTYPENAT  259*   --        Neuro:  GCS Total Debora Coma Score: 15       Exam: no focal deficits noted mental status intact oriented for age x3 Motor and sensory  exam grossly intact  Imaging: Available data reviewed    Fluids:  Intake/Output       02/03/18 0700 - 02/04/18 0659 02/04/18 0700 - 02/05/18 0659 02/05/18 0700 - 02/06/18 0659      3530-2582 8107-6988 Total 7989-0441 6072-3958 Total 6248-9003 6819-5331 Total       Intake    P.O.  240  720 960  1320  600 1920  --  -- --    P.O. 240  600 1920 -- -- --    I.V.  5801.8  1036 6837.9  278.1  -- 278.1  --  -- --    Crystalloid Intake 2000 -- 2000 -- -- -- -- -- --    Precedex Volume 36.9 69 105.9 -- -- -- -- -- --    Nitroglycerin Volume 0.3 -- 0.3 -- -- -- -- -- --    Insulin Volume 27.9 96 123.9 28.1 -- 28.1 -- -- --    Heparin Volume 36.8 -- 36.8 -- -- -- -- -- --    Epinephrine Volume -- 41 41 -- -- -- -- -- --    IV Piggyback Volume (IV Piggyback)  200 -- 200 -- -- --    IV Volume (NS) 550 80 630 50 -- 50 -- -- --    IV Volume (Plasmalyte/LR) 2800 -- 2800 -- -- -- -- -- --    Blood  149  -- 149  --  -- --  --  -- --    Cell Saver Volume (mL) 149 -- 149 -- -- -- -- -- --    Other  --  -- --  --  100 100  --  -- --    Medications (P.O./ Enteral Liquids) -- -- -- -- 100 100 -- -- --    Total Intake 6190.8 1756 7946.9 1598.1 700 2298.1 -- -- --       Output    Urine  4275 4337 6237  1395 767 1845  --  -- --    Number of Times Voided -- -- -- -- 1 x 1 x -- -- --    Indwelling Cathether 4257 1975 6232 1145 -- 1145 -- -- --    Void (ml) -- -- -- 250 450 700 -- -- --    Drains  530  220 750  20  -- 20  --  -- --    Mediastinal Chest Tube 1 530 220 750 20 -- 20 -- -- --    Stool/Urine  --  -- --  0  -- 0  --  -- --    Measurable Stool (ml) -- -- -- 0 -- 0 -- -- --    Stool  --  -- --  --  -- --  --  -- --    Number of Times Stooled 0 x -- 0 x 0 x 0 x 0 x -- -- --    Total Output 8345 2192 8182 9842 020 2457 -- -- --       Net I/O     1403.8 -439 964.9 183.1 250 433.1 -- -- --           Recent Labs      02/02/18   2309  02/03/18   1300   02/04/18   0001  02/04/18   0600  02/05/18   0450   SODIUM  139    --    --    --   140  135   POTASSIUM  3.6   --    < >  3.7  4.1  4.0   CHLORIDE  108   --    --    --   110  105   CO2  23   --    --    --   23  22   BUN  12   --    --    --   11  15   CREATININE  0.81   --    --    --   0.84  1.02   MAGNESIUM   --   2.1   --    --    --    --    CALCIUM  8.4*   --    --    --   7.8*  8.3*    < > = values in this interval not displayed.       GI/Nutrition:  Exam: abdomen is soft and non-tender, normal active bowel sounds, without masses or organomegaly  Imaging: Available data reviewed  tolerating regular diet  Liver Function  Recent Labs      18   0450   ALTSGPT  21   --    --    --    ASTSGOT  34   --    --    --    ALKPHOSPHAT  57   --    --    --    TBILIRUBIN  0.9   --    --    --    LIPASE  11   --    --    --    GLUCOSE  102*  96  75  120*       Heme:  Recent Labs      18   1300  18   0450   RBC  4.55*  4.52*   --   3.29*  3.47*   HEMOGLOBIN  15.5  15.4   --   11.3*  11.9*   HEMATOCRIT  43.7  43.9   --   32.1*  34.3*   PLATELETCT  196  193  140*  157*  182   PROTHROMBTM  13.2  13.1  15.0*   --    --    APTT  29.7  53.0*  31.9   --    --    INR  1.03  1.02  1.21*   --    --        Infectious Disease:  Monitored Temp  Av.5 °C (99.5 °F)  Min: 37.2 °C (99 °F)  Max: 37.8 °C (100 °F)  Temp  Av.4 °C (99.3 °F)  Min: 37.2 °C (99 °F)  Max: 37.8 °C (100 °F)  Micro: reviewed  Recent Labs      18   23018   0450   WBC  8.8  9.4  16.0*  14.2*   NEUTSPOLYS  77.00*   --    --    --    LYMPHOCYTES  14.20*   --    --    --    MONOCYTES  7.30   --    --    --    EOSINOPHILS  0.80   --    --    --    BASOPHILS  0.50   --    --    --    ASTSGOT  34   --    --    --    ALTSGPT  21   --    --    --    ALKPHOSPHAT  57   --    --    --    TBILIRUBIN  0.9   --    --    --      Current Facility-Administered Medications    Medication Dose Frequency Provider Last Rate Last Dose   • insulin NPH (HUMULIN,NOVOLIN) injection 8 Units  8 Units Q8HRS Sandra Dumont A.P.N.   8 Units at 02/05/18 0454   • insulin lispro (HUMALOG) injection 6 Units  6 Units TID AC Sandra Dumont A.P.N.   Stopped at 02/04/18 1700   • insulin lispro (HUMALOG) injection 0-15 Units  0-15 Units ACHS & 0200 Sandra Dumont A.P.N.   Stopped at 02/04/18 1700   • Respiratory Care per Protocol   Continuous RT Sandra Dumont A.P.N.       • NS infusion   Continuous ALICIA Teixeira.P.N. 10 mL/hr at 02/04/18 0700     • Pharmacy Consult Request ...Pain Management Review 1 Each  1 Each PRN ALICIA Teixeira.P.N.       • docusate sodium (COLACE) capsule 100 mg  100 mg QAM Sandra Dumont A.P.N.   100 mg at 02/04/18 0828    And   • senna-docusate (PERICOLACE or SENOKOT S) 8.6-50 MG per tablet 1 Tab  1 Tab Nightly Sandra Dumont A.P.N.   1 Tab at 02/04/18 2157    And   • senna-docusate (PERICOLACE or SENOKOT S) 8.6-50 MG per tablet 1 Tab  1 Tab Q24HRS PRN Sandra Dumont, A.P.N.        And   • lactulose 20 GM/30ML solution 30 mL  30 mL Q24HRS PRN Sandra Dumont, A.P.N.        And   • bisacodyl (DULCOLAX) suppository 10 mg  10 mg Q24HRS PRN Sandra Dumont, A.P.N.        And   • fleet enema 133 mL  1 Each Once PRN Sandra Dumont A.P.N.       • enoxaparin (LOVENOX) inj 40 mg  40 mg DAILY Sandra Dumont A.P.N.   40 mg at 02/04/18 2156   • Magnesium Sulfate in D5W IVPB premix 1 g  1 g QDAY Sandra Dumont A.P.N.   Stopped at 02/04/18 1330   • metoprolol (LOPRESSOR) tablet 25 mg  25 mg BID Sandra Dumont, A.P.N.       • aspirin EC (ECOTRIN) tablet 81 mg  81 mg DAILY Sandra Dumont, A.P.N.   81 mg at 02/04/18 0828   • clopidogrel (PLAVIX) tablet 75 mg  75 mg DAILY Sandra Dumont A.P.N.   75 mg at 02/04/18 0828   • oxyCODONE immediate release (ROXICODONE) tablet 5 mg  5 mg Q3HRS PRN Sandra Dumont, A.P.N.   5 mg at 02/04/18 1301   • oxyCODONE immediate release  (ROXICODONE) tablet 10 mg  10 mg Q3HRS PRN Sandra Dumont, A.P.N.   10 mg at 02/05/18 0140   • tramadol (ULTRAM) 50 MG tablet 50 mg  50 mg Q4HRS PRN Sandra Dumont, A.P.N.   50 mg at 02/04/18 2156   • ondansetron (ZOFRAN) syringe/vial injection 4 mg  4 mg Q6HRS PRN Sandra Dumont, A.P.N.   4 mg at 02/03/18 1636    Or   • prochlorperazine (COMPAZINE) injection 10 mg  10 mg Q6HRS PRN Sandra Dumont, A.P.N.        Or   • promethazine (PHENERGAN) suppository 25 mg  25 mg Q6HRS PRN Sandra Dumont, A.P.N.       • acetaminophen (TYLENOL) tablet 650 mg  650 mg Q4HRS PRN Sandra Dumont, A.P.N.        Or   • acetaminophen (TYLENOL) suppository 650 mg  650 mg Q4HRS PRN Sandra Dumont, A.P.N.       • mag hydrox-al hydrox-simeth (MAALOX PLUS ES or MYLANTA DS) suspension 30 mL  30 mL Q4HRS PRN Sandra Dumont, A.P.N.       • diphenhydrAMINE (BENADRYL) tablet/capsule 25 mg  25 mg HS PRN - MR X 1 Sandra Dumont, A.P.N.       • HYDROcodone-acetaminophen (NORCO) 5-325 MG per tablet 1-2 Tab  1-2 Tab Q4HRS PRN Sandra Dumont, A.P.N.   2 Tab at 02/03/18 1646   • dextrose 50% (D50W) injection 25 mL  25 mL PRN Sandra Dumont, A.P.N.       • atorvastatin (LIPITOR) tablet 80 mg  80 mg QHS Spike Linares M.D.   80 mg at 02/04/18 2157     Last reviewed on 2/3/2018  8:26 AM by Marietta Benitez R.N.    Quality  Measures:  EKG reviewed, Medications reviewed, Labs reviewed and Radiology images reviewed  Bashir catheter: Critically Ill - Requiring Accurate Measurement of Urinary Output  Central line in place: Need for access, Vasopressors and Concentrated IV drugs    DVT Prophylaxis: Enoxaparin (Lovenox)  DVT prophylaxis - mechanical: SCDs  Ulcer prophylaxis: Not indicated          Problems/Plan:    Pos Operative Ventilator Management   - intubated for surgery   - extubated 2/4 per protocol   - cont RT/O2 protocols   - encourage IS/PEP and mobilization  Multivessel CAD s/p 3-vessel CABG   - POD#2   - cont ASA/Plavix   - cont statin   - Chest  tube/pacer wires per CTS   - mobilization  Hypertension   - cont metoprolol  CAD/NSTEMI   - cont statin   - cont ASA/plavix  Acute Leukocytosis   - likely reactive   - no fever   - monitoring  Mild thrombocytosis   - improving    Discussed patient condition and risk of morbidity and/or mortality with RN, RT, Therapies, Pharmacy, Dietary, , Charge nurse / hot rounds, Patient and [facility].    [id number]

## 2018-02-05 NOTE — PROGRESS NOTES
Cardiovascular Surgery Progress Note    Name: Anthony Reyes  MRN: [id number]  : 1960  Admit Date: 2018  2:56 PM  Procedure:  Procedure(s) and Anesthesia Type:     * MULTIPLE CORONARY ARTERY BYPASS X3, ENDO VEIN HARVEST RIGHT LOWER LEG - General     * PB - General  2 Day Post-Op    Vitals:  Patient Vitals for the past 8 hrs:   Temp SpO2 Heart Rate (Monitored) Resp NIBP Weight   18 0720 - 96 % - - - -   18 0400 37.2 °C (99 °F) - 78 (!) 9 128/76 95.5 kg (210 lb 8.6 oz)   18 0300 - - 79 (!) 6 - -   18 0200 - - 95 13 - -   18 0100 - - 80 (!) 10 (!) 107/31 -   18 0000 37.1 °C (98.8 °F) - 83 13 (!) 107/31 -     Temp (24hrs), Av.3 °C (99.2 °F), Min:37.1 °C (98.8 °F), Max:37.8 °C (100 °F)      Respiratory:    Respiration: (!) 9, Pulse Oximetry: 96 %, O2 Daily Delivery Respiratory : Room Air with O2 Available  [REMOVED] Chest Tube Group 1 (A) Mediastinal Straight 32-Tube Status / Drainage: Patent;Sutured in Place;Sanguinous, [REMOVED] Chest Tube Group 2 (B) Mediastinal Angled 32-Tube Status / Drainage: Patent;Sutured in Place;Sanguinous, [REMOVED] Chest Tube Group 1 (A) Mediastinal Straight 32-Device: Dry Closed Drainage System;Suction 20 cm Water, [REMOVED] Chest Tube Group 2 (B) Mediastinal Angled 32-Device: Dry Closed Drainage System;Suction 20 cm Water  Chest Tube Drains:     Mediastinal Chest Tube 1: 0 ml (770 total drain output)    Fluids:    Intake/Output Summary (Last 24 hours) at 18 0755  Last data filed at 18 0600   Gross per 24 hour   Intake          2484.07 ml   Output             2265 ml   Net           219.07 ml     Admit weight: Weight: 93.4 kg (206 lb)  Current weight: Weight: 95.5 kg (210 lb 8.6 oz) (18 0400)    Labs:  Recent Labs      18   2309  18   1300  18   0600  18   0450   WBC  9.4   --   16.0*  14.2*   RBC  4.52*   --   3.29*  3.47*   HEMOGLOBIN  15.4   --   11.3*  11.9*   HEMATOCRIT  43.9   --   32.1*  34.3*    MCV  97.1   --   97.6  98.8*   MCH  34.1*   --   34.3*  34.3*   MCHC  35.1   --   35.2  34.7   RDW  42.6   --   42.4  43.8   PLATELETCT  193  140*  157*  182   MPV  11.1   --   11.0  11.3     Recent Labs      02/02/18   1515   NEUTSPOLYS  77.00*   LYMPHOCYTES  14.20*   MONOCYTES  7.30   EOSINOPHILS  0.80   BASOPHILS  0.50     Recent Labs      02/02/18   2309   02/04/18   0001  02/04/18   0600  02/05/18   0450   SODIUM  139   --    --   140  135   POTASSIUM  3.6   < >  3.7  4.1  4.0   CHLORIDE  108   --    --   110  105   CO2  23   --    --   23  22   GLUCOSE  96   --    --   75  120*   BUN  12   --    --   11  15   CREATININE  0.81   --    --   0.84  1.02   CALCIUM  8.4*   --    --   7.8*  8.3*    < > = values in this interval not displayed.     Recent Labs      02/02/18   1515  02/02/18   2309  02/03/18   1300   APTT  29.7  53.0*  31.9   INR  1.03  1.02  1.21*       Medications:  • furosemide  20 mg     • potassium chloride SA  10 mEq     • docusate sodium  100 mg      And   • senna-docusate  1 Tab     • enoxaparin  40 mg     • magnesium sulfate  1 g Stopped (02/04/18 1330)   • metoprolol  25 mg     • aspirin EC  81 mg     • clopidogrel  75 mg     • atorvastatin  80 mg         Exam:   Review of Systems   Constitutional: Negative.    HENT: Negative.    Eyes: Negative.    Respiratory: Negative.    Cardiovascular: Negative.    Gastrointestinal: Negative.    Genitourinary: Negative.    Musculoskeletal: Negative.    Skin: Negative.    Neurological: Negative.    Endo/Heme/Allergies: Negative.    Psychiatric/Behavioral: Negative.        Physical Exam   Constitutional: He is oriented to person, place, and time. He appears well-developed and well-nourished. No distress.   Neck: Neck supple.   Cardiovascular: Normal rate, regular rhythm and normal heart sounds.  Exam reveals no gallop and no friction rub.    No murmur heard.  Pulmonary/Chest: Effort normal. No respiratory distress. He has decreased breath sounds in the  right lower field and the left lower field. He has no wheezes. He has no rales.   Abdominal: Soft. He exhibits no distension. There is no tenderness.   Neurological: He is alert and oriented to person, place, and time.   Skin: Skin is warm and dry. He is not diaphoretic.       Quality Measures:     Reviewed items::  EKG reviewed, Labs reviewed, Medications reviewed and Radiology images reviewed  Rodrgiuez catheter::  One or Two Days Post Surgery (Day of Surgery being Day 0)  Central line in place:  Need for access  DVT prophylaxis pharmacological::  Enoxaparin (Lovenox) and Not indicated at this time, ambulatory  DVT prophylaxis - mechanical:  SCDs  Ulcer Prophylaxis::  Yes      Assessment/Plan:  POD 1 HDS, SR, d/c ct/rodriguez, amb, enc IS  POD 2 HDS, NSR, 2 L NC. Passing gas no BM yet, feeling well.  2Kg above baseline, PLAN: Bowel protocol, add low dose diuresis. AMB/IS.     Patient seen, examined and plan reviewed with midlevel provider. I agree with the plan.      Active Hospital Problems    Diagnosis   • ACS (acute coronary syndrome) (CMS-HCC) [I24.9]

## 2018-02-06 LAB
ANION GAP SERPL CALC-SCNC: 9 MMOL/L (ref 0–11.9)
BUN SERPL-MCNC: 14 MG/DL (ref 8–22)
CALCIUM SERPL-MCNC: 8 MG/DL (ref 8.5–10.5)
CHLORIDE SERPL-SCNC: 108 MMOL/L (ref 96–112)
CO2 SERPL-SCNC: 22 MMOL/L (ref 20–33)
CREAT SERPL-MCNC: 0.94 MG/DL (ref 0.5–1.4)
ERYTHROCYTE [DISTWIDTH] IN BLOOD BY AUTOMATED COUNT: 42.2 FL (ref 35.9–50)
GLUCOSE SERPL-MCNC: 110 MG/DL (ref 65–99)
HCT VFR BLD AUTO: 31.6 % (ref 42–52)
HGB BLD-MCNC: 11.1 G/DL (ref 14–18)
MCH RBC QN AUTO: 34.5 PG (ref 27–33)
MCHC RBC AUTO-ENTMCNC: 35.1 G/DL (ref 33.7–35.3)
MCV RBC AUTO: 98.1 FL (ref 81.4–97.8)
PLATELET # BLD AUTO: 162 K/UL (ref 164–446)
PMV BLD AUTO: 11 FL (ref 9–12.9)
POTASSIUM SERPL-SCNC: 4 MMOL/L (ref 3.6–5.5)
RBC # BLD AUTO: 3.22 M/UL (ref 4.7–6.1)
SODIUM SERPL-SCNC: 139 MMOL/L (ref 135–145)
WBC # BLD AUTO: 8.7 K/UL (ref 4.8–10.8)

## 2018-02-06 PROCEDURE — 85027 COMPLETE CBC AUTOMATED: CPT

## 2018-02-06 PROCEDURE — 700112 HCHG RX REV CODE 229: Performed by: NURSE PRACTITIONER

## 2018-02-06 PROCEDURE — 94669 MECHANICAL CHEST WALL OSCILL: CPT

## 2018-02-06 PROCEDURE — A9270 NON-COVERED ITEM OR SERVICE: HCPCS | Performed by: NURSE PRACTITIONER

## 2018-02-06 PROCEDURE — 700102 HCHG RX REV CODE 250 W/ 637 OVERRIDE(OP): Performed by: NURSE PRACTITIONER

## 2018-02-06 PROCEDURE — A9270 NON-COVERED ITEM OR SERVICE: HCPCS | Performed by: HOSPITALIST

## 2018-02-06 PROCEDURE — 700111 HCHG RX REV CODE 636 W/ 250 OVERRIDE (IP): Performed by: NURSE PRACTITIONER

## 2018-02-06 PROCEDURE — 700102 HCHG RX REV CODE 250 W/ 637 OVERRIDE(OP): Performed by: HOSPITALIST

## 2018-02-06 PROCEDURE — 80048 BASIC METABOLIC PNL TOTAL CA: CPT

## 2018-02-06 PROCEDURE — 94668 MNPJ CHEST WALL SBSQ: CPT

## 2018-02-06 PROCEDURE — 770020 HCHG ROOM/CARE - TELE (206)

## 2018-02-06 RX ORDER — ENALAPRIL MALEATE 2.5 MG/1
2.5 TABLET ORAL
Status: DISCONTINUED | OUTPATIENT
Start: 2018-02-06 | End: 2018-02-07 | Stop reason: HOSPADM

## 2018-02-06 RX ORDER — CARVEDILOL 3.12 MG/1
3.12 TABLET ORAL 2 TIMES DAILY WITH MEALS
Status: DISCONTINUED | OUTPATIENT
Start: 2018-02-06 | End: 2018-02-07 | Stop reason: HOSPADM

## 2018-02-06 RX ADMIN — OXYCODONE HYDROCHLORIDE 10 MG: 10 TABLET ORAL at 04:26

## 2018-02-06 RX ADMIN — POTASSIUM CHLORIDE 10 MEQ: 1500 TABLET, EXTENDED RELEASE ORAL at 07:55

## 2018-02-06 RX ADMIN — OXYCODONE HYDROCHLORIDE 10 MG: 10 TABLET ORAL at 14:58

## 2018-02-06 RX ADMIN — TRAMADOL HYDROCHLORIDE 50 MG: 50 TABLET, COATED ORAL at 16:11

## 2018-02-06 RX ADMIN — CLOPIDOGREL 75 MG: 75 TABLET, FILM COATED ORAL at 07:56

## 2018-02-06 RX ADMIN — DOCUSATE SODIUM 100 MG: 100 CAPSULE ORAL at 07:55

## 2018-02-06 RX ADMIN — OXYCODONE HYDROCHLORIDE 10 MG: 10 TABLET ORAL at 07:56

## 2018-02-06 RX ADMIN — OXYCODONE HYDROCHLORIDE 10 MG: 10 TABLET ORAL at 11:29

## 2018-02-06 RX ADMIN — ASPIRIN 81 MG: 81 TABLET, COATED ORAL at 07:56

## 2018-02-06 RX ADMIN — ATORVASTATIN CALCIUM 80 MG: 80 TABLET, FILM COATED ORAL at 20:50

## 2018-02-06 RX ADMIN — ENALAPRIL MALEATE 2.5 MG: 2.5 TABLET ORAL at 11:29

## 2018-02-06 RX ADMIN — ENOXAPARIN SODIUM 40 MG: 100 INJECTION SUBCUTANEOUS at 07:55

## 2018-02-06 RX ADMIN — CARVEDILOL 3.12 MG: 3.12 TABLET, FILM COATED ORAL at 16:11

## 2018-02-06 RX ADMIN — FUROSEMIDE 20 MG: 20 TABLET ORAL at 07:56

## 2018-02-06 RX ADMIN — METOPROLOL TARTRATE 25 MG: 25 TABLET, FILM COATED ORAL at 07:56

## 2018-02-06 RX ADMIN — OXYCODONE HYDROCHLORIDE 10 MG: 10 TABLET ORAL at 00:05

## 2018-02-06 RX ADMIN — OXYCODONE HYDROCHLORIDE 10 MG: 10 TABLET ORAL at 18:04

## 2018-02-06 ASSESSMENT — PAIN SCALES - GENERAL
PAINLEVEL_OUTOF10: 6
PAINLEVEL_OUTOF10: 6
PAINLEVEL_OUTOF10: 5
PAINLEVEL_OUTOF10: 6
PAINLEVEL_OUTOF10: 5
PAINLEVEL_OUTOF10: 4
PAINLEVEL_OUTOF10: 3
PAINLEVEL_OUTOF10: 6
PAINLEVEL_OUTOF10: 5
PAINLEVEL_OUTOF10: 5
PAINLEVEL_OUTOF10: 6
PAINLEVEL_OUTOF10: 6
PAINLEVEL_OUTOF10: 7
PAINLEVEL_OUTOF10: 4
PAINLEVEL_OUTOF10: 5

## 2018-02-06 ASSESSMENT — ENCOUNTER SYMPTOMS
PSYCHIATRIC NEGATIVE: 1
NEUROLOGICAL NEGATIVE: 1
EYES NEGATIVE: 1
GASTROINTESTINAL NEGATIVE: 1
CONSTITUTIONAL NEGATIVE: 1
CARDIOVASCULAR NEGATIVE: 1
RESPIRATORY NEGATIVE: 1
MUSCULOSKELETAL NEGATIVE: 1

## 2018-02-06 NOTE — THERAPY
"Occupational Therapy Evaluation completed.   Functional Status:  Pt educated on sternal & cardiac precautions during ADL's & homemaking tasks & provided with written handout.  Pt having difficult time processing all that's happened to him & tended to focus on the negative events in the past.  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  Pt currently able to complete basic ADL's with CGA & V/Q's to ensure sternal precautions are followed.  Plan of Care: Will benefit from Occupational Therapy 3 times per week  Discharge Recommendations:  Equipment: Will Continue to Assess for Equipment Needs. Post-acute therapy Discharge to a transitional care facility for continued skilled therapy services.    Pt may be able to D/C home if he continues to progress well & can arrange for some help at home.    See \"Rehab Therapy-Acute\" Patient Summary Report for complete documentation.    "

## 2018-02-06 NOTE — CARE PLAN
Problem: Post Op Day 3 CABG/Heart Valve replacement  Goal: Optimal care of the post op CABG/Heart Valve replacement post op day 3    Intervention: Daily Weights  Done  Intervention: Shower daily and clean incisions twice daily with soap and water  Done  Intervention: Up in chair for all meals  Back to bed, but agreed to get up to chair for breakfast  Intervention: IS q 1 hour while awake and record best IS volume  Best IS 2000 mL

## 2018-02-06 NOTE — PROGRESS NOTES
Pulmonary Critical Care Progress Note        Date of Service:  2/6/2018  Chief Complaint: Worsening intermittent chest pain    History of Present Illness: 57 y.o. male admitted on 2/2/2018 for NSTEMI undergoing cardiac catheterization demonstrating multivessel coronary artery disease who underwent coronary artery bypass grafting x 3 on 2/4/2018 who came to the ICU for post operative ventilator management     ROS: feeling better today, not as much dyspnea with exertion, no chest pain    Interval Events:  24 hour interval history reviewed    - POD#3 for CABGx3   - a/ox4   - SR 60-90s   - SBP 120s   - good appetite and tolerating diet   - passing gas, but no BM yet   - no CXR today   - IS at 2000cc   - ambulating around ICU     Yesterday's Events:   - POD#2   - CABGx3   - A/Ox4   - ST 100s   - SBP 100s   - Cardiac diet   - No BM   - no Bashir and adequate UOP in urinal   - standby assist   - CXR(reviewed): clear lung fields    PFSH:  No change.    Respiratory:     Pulse Oximetry: 95 %    Exam: no distress, clear throughout, no wheezing  ImagingAvailable data reviewed   Recent Labs      02/03/18   1411  02/03/18   1509  02/03/18   1558   ISTATAPH  7.424  7.398*  7.394*   ISTATAPCO2  35.9  35.4  35.0   ISTATAPO2  96*  93*  90*   ISTATATCO2  25  23  22   BGCPPXT3QUR  98  97  97   ISTATARTHCO3  23.5  21.8  21.4   ISTATARTBE  -1  -3  -3   ISTATTEMP  36.2 C  36.3 C  37.0 C   ISTATFIO2  60  50  40   ISTATSPEC  Arterial  Arterial  Arterial   ISTATAPHTC  7.436  7.408  7.394*   [id number]  91*  89*  90*       HemoDynamics:  Pulse: 88, Heart Rate (Monitored): (!) 101  Blood Pressure: (!) 99/57 (at rest; 101/75 after ambulating in hallway), NIBP: 103/62      Exam: regular rate and rhythm, no murmur, good cap refill, 2+ dpp=, no pedal edema  Imaging: Available data reviewed        Neuro:  GCS Total Debora Coma Score: 15       Exam: clear speech, symmetrical facial expressions, motor/sensory intact, answering all  appropriately  Imaging: Available data reviewed    Fluids:  Intake/Output       02/04/18 0700 - 02/05/18 0659 02/05/18 0700 - 02/06/18 0659 02/06/18 0700 - 02/07/18 0659      2457-8998 1198-4554 Total 0700-1859 1900-0659 Total 0700-1859 1900-0659 Total       Intake    P.O.  1320  800 2120  1500  450 1950  --  -- --    P.O. 6678 022 2320 2190 101 8471 -- -- --    I.V.  278.1  -- 278.1  --  -- --  --  -- --    Insulin Volume 28.1 -- 28.1 -- -- -- -- -- --    IV Piggyback Volume (IV Piggyback) 200 -- 200 -- -- -- -- -- --    IV Volume (NS) 50 -- 50 -- -- -- -- -- --    Other  --  100 100  --  -- --  --  -- --    Medications (P.O./ Enteral Liquids) -- 100 100 -- -- -- -- -- --    Total Intake 1598.1 900 2498.1 5932 753 8021 -- -- --       Output    Urine  1395  1200 2595  2325  3000 5325  --  -- --    Number of Times Voided -- 2 x 2 x 2 x 4 x 6 x -- -- --    Indwelling Cathether 1145 -- 1145 -- 3000 3000 -- -- --    Void (ml) 250 1200 1450 2325 -- 2325 -- -- --    Drains  20  -- 20  --  -- --  --  -- --    Mediastinal Chest Tube 1 20 -- 20 -- -- -- -- -- --    Stool/Urine  0  -- 0  --  -- --  --  -- --    Measurable Stool (ml) 0 -- 0 -- -- -- -- -- --    Stool  --  -- --  --  -- --  --  -- --    Number of Times Stooled 0 x 0 x 0 x 0 x -- 0 x -- -- --    Total Output 1415 1200 2615 2325 3000 5325 -- -- --       Net I/O     183.1 -300 -116.9 -825 -2550 -3375 -- -- --           Recent Labs      02/03/18   1300   02/04/18   0600  02/05/18   0450  02/06/18   0430   SODIUM   --    --   140  135  139   POTASSIUM   --    < >  4.1  4.0  4.0   CHLORIDE   --    --   110  105  108   CO2   --    --   23  22  22   BUN   --    --   11  15  14   CREATININE   --    --   0.84  1.02  0.94   MAGNESIUM  2.1   --    --    --    --    CALCIUM   --    --   7.8*  8.3*  8.0*    < > = values in this interval not displayed.       GI/Nutrition:  Exam: (+)bs, soft, NT/ND, no masses  Imaging: Available data reviewed  Tolerating regular diet  Liver  Function  Recent Labs      18   0618   043   GLUCOSE  75  120*  110*       Heme:  Recent Labs      18   1300  18   0600  18   04518   043   RBC   --   3.29*  3.47*  3.22*   HEMOGLOBIN   --   11.3*  11.9*  11.1*   HEMATOCRIT   --   32.1*  34.3*  31.6*   PLATELETCT  140*  157*  182  162*   PROTHROMBTM  15.0*   --    --    --    APTT  31.9   --    --    --    INR  1.21*   --    --    --        Infectious Disease:  Temp  Av.9 °C (98.4 °F)  Min: 36.6 °C (97.8 °F)  Max: 37.6 °C (99.7 °F)  Micro: reviewed  Recent Labs      18   0618   WBC  16.0*  14.2*  8.7     Current Facility-Administered Medications   Medication Dose Frequency Provider Last Rate Last Dose   • furosemide (LASIX) tablet 20 mg  20 mg Q DAY Shilpa Figueroa, A.P.N.   20 mg at 18   • potassium chloride SA (Kdur) tablet 10 mEq  10 mEq DAILY Shilpa Figueroa, A.P.N.   10 mEq at 18   • Respiratory Care per Protocol   Continuous RT Sandra Dumont A.P.N.       • NS infusion   Continuous Sandra Dumont A.P.N. 10 mL/hr at 18 0700     • Pharmacy Consult Request ...Pain Management Review 1 Each  1 Each PRN Sandra Dumont A.P.N.       • docusate sodium (COLACE) capsule 100 mg  100 mg QAM Sandra Dumont A.P.N.   100 mg at 18    And   • senna-docusate (PERICOLACE or SENOKOT S) 8.6-50 MG per tablet 1 Tab  1 Tab Nightly ALICIA Teixeira.P.N.   1 Tab at 18    And   • senna-docusate (PERICOLACE or SENOKOT S) 8.6-50 MG per tablet 1 Tab  1 Tab Q24HRS PRN Sandra Dumont, A.P.N.        And   • lactulose 20 GM/30ML solution 30 mL  30 mL Q24HRS PRN Sandra Dumont, A.P.N.        And   • bisacodyl (DULCOLAX) suppository 10 mg  10 mg Q24HRS PRN Sandra Dumont, A.P.N.        And   • fleet enema 133 mL  1 Each Once PRN Sandra Dumont, A.P.N.       • enoxaparin (LOVENOX) inj 40 mg  40 mg DAILY Sandra Dumont, A.P.N.    40 mg at 02/05/18 0905   • metoprolol (LOPRESSOR) tablet 25 mg  25 mg BID Sanrda Dumont, A.P.N.   25 mg at 02/05/18 2052   • aspirin EC (ECOTRIN) tablet 81 mg  81 mg DAILY Sandra Dumont, A.P.N.   81 mg at 02/05/18 0902   • clopidogrel (PLAVIX) tablet 75 mg  75 mg DAILY Sandra Dumont, A.P.N.   75 mg at 02/05/18 0901   • oxyCODONE immediate release (ROXICODONE) tablet 5 mg  5 mg Q3HRS PRN Sandra Dumont, A.P.N.   5 mg at 02/04/18 1301   • oxyCODONE immediate release (ROXICODONE) tablet 10 mg  10 mg Q3HRS PRN Sandra Dumont, A.P.N.   10 mg at 02/06/18 0426   • tramadol (ULTRAM) 50 MG tablet 50 mg  50 mg Q4HRS PRN Sandra Dumont, A.P.N.   50 mg at 02/04/18 2156   • ondansetron (ZOFRAN) syringe/vial injection 4 mg  4 mg Q6HRS PRN Sandra Dumont, A.P.N.   4 mg at 02/03/18 1636    Or   • prochlorperazine (COMPAZINE) injection 10 mg  10 mg Q6HRS PRN Sandra Dumont, A.P.N.        Or   • promethazine (PHENERGAN) suppository 25 mg  25 mg Q6HRS PRN Sandra Dumont, A.P.N.       • acetaminophen (TYLENOL) tablet 650 mg  650 mg Q4HRS PRN Sandra Dumont, A.P.N.        Or   • acetaminophen (TYLENOL) suppository 650 mg  650 mg Q4HRS PRN Sandra Dumont, A.P.N.       • mag hydrox-al hydrox-simeth (MAALOX PLUS ES or MYLANTA DS) suspension 30 mL  30 mL Q4HRS PRN Sandra Dumont, A.P.N.       • diphenhydrAMINE (BENADRYL) tablet/capsule 25 mg  25 mg HS PRN - MR X 1 Sandra Dumont, A.P.N.       • HYDROcodone-acetaminophen (NORCO) 5-325 MG per tablet 1-2 Tab  1-2 Tab Q4HRS PRN ISAURO Teixeira   2 Tab at 02/03/18 1646   • atorvastatin (LIPITOR) tablet 80 mg  80 mg QHS Spike Linares M.D.   80 mg at 02/05/18 2051     Last reviewed on 2/3/2018  8:26 AM by Marietta Benitez R.N.    Quality  Measures:  EKG reviewed, Medications reviewed, Labs reviewed and Radiology images reviewed  Bashir catheter: Critically Ill - Requiring Accurate Measurement of Urinary Output  Central line in place: Need for access, Vasopressors and  Concentrated IV drugs    DVT Prophylaxis: Enoxaparin (Lovenox)  DVT prophylaxis - mechanical: SCDs  Ulcer prophylaxis: Not indicated          Problems/Plan:    Pos Operative Ventilator Management   - intubated for surgery   - extubated 2/4 per protocol   - cont RT/O2 protocols   - cont IS/PEP and mobilization  Multivessel CAD s/p 3-vessel CABG   - POD#2   - cont ASA/Plavix   - cont statin   - discontinued chest tubes/wires   - mobilization  Hypertension   - cont metoprolol  CAD/NSTEMI   - cont statin   - cont ASA/plavix  Acute Leukocytosis   - likely reactive   - no fever   - monitoring  Mild thrombocytosis   - improving    Pt's exam, assessment, and plan remain unchanged.      Discussed patient condition and risk of morbidity and/or mortality with RN, RT, Therapies, Pharmacy, Dietary, , Charge nurse / hot rounds, Patient and [facility].    [id number]

## 2018-02-06 NOTE — CARE PLAN
Problem: Post op day 2 CABG/Heart Valve Replacement  Goal: Optimal care of the post op CABG/heart valve replacement post op day 2  Outcome: PROGRESSING AS EXPECTED    Intervention: FSBS: when 2 consecutive BS < 130 after post op day 2, discontinue FSBS unless patient is insulin dependent diabetic  Complete  Intervention: Daily Weights  Complete  Intervention: Up in chair for all meals  Complete  Intervention: Ambulate, increasing the distance each time x 3 and before bed  Patient ambulated x3, increasing distance each time.   Intervention: Stand at sink and wash up with assistance.  Clean incisions twice daily with soap and water.  Complete  Intervention: IS q 1 hour while awake and record best IS volume  Complete. Best IS 1850  Intervention: Consider pacer wire removal by MD  Complete  Intervention: Consider removal of rodriguez and chest tube if not already done  Complete

## 2018-02-06 NOTE — PROGRESS NOTES
Cardiovascular Surgery Progress Note    Name: Anthony Reyes  MRN: [id number]  : 1960  Admit Date: 2018  2:56 PM  Procedure:  Procedure(s) and Anesthesia Type:     * MULTIPLE CORONARY ARTERY BYPASS X3, ENDO VEIN HARVEST RIGHT LOWER LEG - General     * PB - General  3 Day Post-Op    Vitals:  Patient Vitals for the past 8 hrs:   Temp SpO2 O2 Delivery Pulse Resp NIBP   18 0718 - 90 % - 90 18 -   18 0430 - 95 % None (Room Air) 88 20 103/62   18 0400 36.9 °C (98.4 °F) - - - - -     Temp (24hrs), Av.9 °C (98.5 °F), Min:36.6 °C (97.8 °F), Max:37.6 °C (99.7 °F)      Respiratory:    Respiration: 18, Pulse Oximetry: 90 %, O2 Daily Delivery Respiratory : Room Air with O2 Available     Chest Tube Drains:          Fluids:    Intake/Output Summary (Last 24 hours) at 18 0851  Last data filed at 18 0400   Gross per 24 hour   Intake             1470 ml   Output             5325 ml   Net            -3855 ml     Admit weight: Weight: 93.4 kg (206 lb)  Current weight: Weight: 95.5 kg (210 lb 8.6 oz) (18 0400)    Labs:  Recent Labs      18   0600  18   0450  18   0430   WBC  16.0*  14.2*  8.7   RBC  3.29*  3.47*  3.22*   HEMOGLOBIN  11.3*  11.9*  11.1*   HEMATOCRIT  32.1*  34.3*  31.6*   MCV  97.6  98.8*  98.1*   MCH  34.3*  34.3*  34.5*   MCHC  35.2  34.7  35.1   RDW  42.4  43.8  42.2   PLATELETCT  157*  182  162*   MPV  11.0  11.3  11.0         Recent Labs      18   0600  18   0450  18   0430   SODIUM  140  135  139   POTASSIUM  4.1  4.0  4.0   CHLORIDE  110  105  108   CO2  23  22  22   GLUCOSE  75  120*  110*   BUN  11  15  14   CREATININE  0.84  1.02  0.94   CALCIUM  7.8*  8.3*  8.0*     Recent Labs      18   1300   APTT  31.9   INR  1.21*       Medications:  • furosemide  20 mg     • potassium chloride SA  10 mEq     • docusate sodium  100 mg      And   • senna-docusate  1 Tab     • enoxaparin  40 mg     • metoprolol  25 mg     • aspirin EC   81 mg     • clopidogrel  75 mg     • atorvastatin  80 mg         Exam:   Review of Systems   Constitutional: Negative.    HENT: Negative.    Eyes: Negative.    Respiratory: Negative.    Cardiovascular: Negative.    Gastrointestinal: Negative.    Genitourinary: Negative.    Musculoskeletal: Negative.    Skin: Negative.    Neurological: Negative.    Endo/Heme/Allergies: Negative.    Psychiatric/Behavioral: Negative.        Physical Exam   Constitutional: He is oriented to person, place, and time. He appears well-developed and well-nourished. No distress.   Neck: Neck supple.   Cardiovascular: Normal rate, regular rhythm and normal heart sounds.  Exam reveals no gallop and no friction rub.    No murmur heard.  Pulmonary/Chest: Effort normal. No respiratory distress. He has decreased breath sounds in the right lower field and the left lower field. He has no wheezes. He has no rales.   Abdominal: Soft. He exhibits no distension. There is no tenderness.   Neurological: He is alert and oriented to person, place, and time.   Skin: Skin is warm and dry. He is not diaphoretic.       Quality Measures:     Reviewed items::  EKG reviewed, Labs reviewed, Medications reviewed and Radiology images reviewed  Rodriguez catheter::  One or Two Days Post Surgery (Day of Surgery being Day 0)  Central line in place:  Need for access  DVT prophylaxis pharmacological::  Enoxaparin (Lovenox) and Not indicated at this time, ambulatory  DVT prophylaxis - mechanical:  SCDs  Ulcer Prophylaxis::  Yes      Assessment/Plan:  POD 1 HDS, SR, d/c ct/rodriguez, amb, enc IS  POD 2 HDS, NSR, 2 L NC. Passing gas no BM yet, feeling good.  2Kg above baseline, PLAN: Bowel protocol, add low dose diuresis. AMB/IS.   POD 3 HDS, SR, room air, cont diuresis, amb, enc IS, plan transfer to skilled tomorrow    Patient seen, examined and plan reviewed with midlevel provider. I agree with the plan.      Active Hospital Problems    Diagnosis   • ACS (acute coronary syndrome)  (CMS-HCC) [I24.9]

## 2018-02-07 VITALS
HEIGHT: 74 IN | WEIGHT: 202.6 LBS | OXYGEN SATURATION: 93 % | TEMPERATURE: 97.7 F | HEART RATE: 81 BPM | DIASTOLIC BLOOD PRESSURE: 76 MMHG | RESPIRATION RATE: 16 BRPM | BODY MASS INDEX: 26 KG/M2 | SYSTOLIC BLOOD PRESSURE: 119 MMHG

## 2018-02-07 LAB
ANION GAP SERPL CALC-SCNC: 7 MMOL/L (ref 0–11.9)
BUN SERPL-MCNC: 15 MG/DL (ref 8–22)
CALCIUM SERPL-MCNC: 8.2 MG/DL (ref 8.5–10.5)
CHLORIDE SERPL-SCNC: 105 MMOL/L (ref 96–112)
CO2 SERPL-SCNC: 24 MMOL/L (ref 20–33)
CREAT SERPL-MCNC: 0.98 MG/DL (ref 0.5–1.4)
ERYTHROCYTE [DISTWIDTH] IN BLOOD BY AUTOMATED COUNT: 42.4 FL (ref 35.9–50)
GLUCOSE SERPL-MCNC: 123 MG/DL (ref 65–99)
HCT VFR BLD AUTO: 29.8 % (ref 42–52)
HGB BLD-MCNC: 10.1 G/DL (ref 14–18)
MCH RBC QN AUTO: 33.3 PG (ref 27–33)
MCHC RBC AUTO-ENTMCNC: 33.9 G/DL (ref 33.7–35.3)
MCV RBC AUTO: 98.3 FL (ref 81.4–97.8)
PLATELET # BLD AUTO: 188 K/UL (ref 164–446)
PMV BLD AUTO: 11.5 FL (ref 9–12.9)
POTASSIUM SERPL-SCNC: 3.7 MMOL/L (ref 3.6–5.5)
RBC # BLD AUTO: 3.03 M/UL (ref 4.7–6.1)
SODIUM SERPL-SCNC: 136 MMOL/L (ref 135–145)
WBC # BLD AUTO: 7.7 K/UL (ref 4.8–10.8)

## 2018-02-07 PROCEDURE — A9270 NON-COVERED ITEM OR SERVICE: HCPCS | Performed by: NURSE PRACTITIONER

## 2018-02-07 PROCEDURE — 80048 BASIC METABOLIC PNL TOTAL CA: CPT

## 2018-02-07 PROCEDURE — 700102 HCHG RX REV CODE 250 W/ 637 OVERRIDE(OP): Performed by: NURSE PRACTITIONER

## 2018-02-07 PROCEDURE — 97116 GAIT TRAINING THERAPY: CPT

## 2018-02-07 PROCEDURE — 700102 HCHG RX REV CODE 250 W/ 637 OVERRIDE(OP): Performed by: INTERNAL MEDICINE

## 2018-02-07 PROCEDURE — 700112 HCHG RX REV CODE 229: Performed by: NURSE PRACTITIONER

## 2018-02-07 PROCEDURE — G8980 MOBILITY D/C STATUS: HCPCS | Mod: CI

## 2018-02-07 PROCEDURE — 85027 COMPLETE CBC AUTOMATED: CPT

## 2018-02-07 PROCEDURE — 700111 HCHG RX REV CODE 636 W/ 250 OVERRIDE (IP): Performed by: NURSE PRACTITIONER

## 2018-02-07 PROCEDURE — G8979 MOBILITY GOAL STATUS: HCPCS | Mod: CI

## 2018-02-07 PROCEDURE — A9270 NON-COVERED ITEM OR SERVICE: HCPCS | Performed by: INTERNAL MEDICINE

## 2018-02-07 PROCEDURE — 97530 THERAPEUTIC ACTIVITIES: CPT

## 2018-02-07 RX ORDER — ENALAPRIL MALEATE 2.5 MG/1
2.5 TABLET ORAL DAILY
Qty: 30 TAB
Start: 2018-02-08 | End: 2018-04-02 | Stop reason: SDUPTHER

## 2018-02-07 RX ORDER — ATORVASTATIN CALCIUM 80 MG/1
80 TABLET, FILM COATED ORAL
Qty: 30 TAB
Start: 2018-02-07 | End: 2018-04-02 | Stop reason: SDUPTHER

## 2018-02-07 RX ORDER — CLOPIDOGREL BISULFATE 75 MG/1
75 TABLET ORAL DAILY
Qty: 30 TAB | Refills: 3
Start: 2018-02-08 | End: 2018-04-02 | Stop reason: SDUPTHER

## 2018-02-07 RX ORDER — POTASSIUM CHLORIDE 20 MEQ/1
40 TABLET, EXTENDED RELEASE ORAL ONCE
Status: COMPLETED | OUTPATIENT
Start: 2018-02-07 | End: 2018-02-07

## 2018-02-07 RX ORDER — ASPIRIN 81 MG/1
81 TABLET ORAL DAILY
Qty: 30 TAB
Start: 2018-02-08

## 2018-02-07 RX ORDER — OXYCODONE HYDROCHLORIDE 10 MG/1
10 TABLET ORAL EVERY 4 HOURS PRN
Qty: 30 TAB | Refills: 0 | Status: SHIPPED | OUTPATIENT
Start: 2018-02-07 | End: 2018-02-12

## 2018-02-07 RX ORDER — CARVEDILOL 3.12 MG/1
3.12 TABLET ORAL 2 TIMES DAILY
Qty: 60 TAB | Refills: 3
Start: 2018-02-07 | End: 2018-04-02 | Stop reason: SDUPTHER

## 2018-02-07 RX ADMIN — ENOXAPARIN SODIUM 40 MG: 100 INJECTION SUBCUTANEOUS at 07:37

## 2018-02-07 RX ADMIN — CARVEDILOL 3.12 MG: 3.12 TABLET, FILM COATED ORAL at 17:26

## 2018-02-07 RX ADMIN — TRAMADOL HYDROCHLORIDE 50 MG: 50 TABLET, COATED ORAL at 02:38

## 2018-02-07 RX ADMIN — TRAMADOL HYDROCHLORIDE 50 MG: 50 TABLET, COATED ORAL at 10:48

## 2018-02-07 RX ADMIN — CLOPIDOGREL 75 MG: 75 TABLET, FILM COATED ORAL at 07:37

## 2018-02-07 RX ADMIN — CARVEDILOL 3.12 MG: 3.12 TABLET, FILM COATED ORAL at 07:37

## 2018-02-07 RX ADMIN — DOCUSATE SODIUM 100 MG: 100 CAPSULE ORAL at 07:37

## 2018-02-07 RX ADMIN — LACTULOSE 30 ML: 20 SOLUTION ORAL at 08:46

## 2018-02-07 RX ADMIN — OXYCODONE HYDROCHLORIDE 10 MG: 10 TABLET ORAL at 07:42

## 2018-02-07 RX ADMIN — OXYCODONE HYDROCHLORIDE 10 MG: 10 TABLET ORAL at 00:35

## 2018-02-07 RX ADMIN — OXYCODONE HYDROCHLORIDE 10 MG: 10 TABLET ORAL at 17:26

## 2018-02-07 RX ADMIN — POTASSIUM CHLORIDE 40 MEQ: 1500 TABLET, EXTENDED RELEASE ORAL at 10:48

## 2018-02-07 RX ADMIN — OXYCODONE HYDROCHLORIDE 10 MG: 10 TABLET ORAL at 04:37

## 2018-02-07 RX ADMIN — ENALAPRIL MALEATE 2.5 MG: 2.5 TABLET ORAL at 07:37

## 2018-02-07 RX ADMIN — ASPIRIN 81 MG: 81 TABLET, COATED ORAL at 07:37

## 2018-02-07 ASSESSMENT — ENCOUNTER SYMPTOMS
RESPIRATORY NEGATIVE: 1
EYES NEGATIVE: 1
GASTROINTESTINAL NEGATIVE: 1
MUSCULOSKELETAL NEGATIVE: 1
NEUROLOGICAL NEGATIVE: 1
CONSTITUTIONAL NEGATIVE: 1
PSYCHIATRIC NEGATIVE: 1
CARDIOVASCULAR NEGATIVE: 1

## 2018-02-07 ASSESSMENT — LIFESTYLE VARIABLES: EVER_SMOKED: NEVER

## 2018-02-07 ASSESSMENT — PAIN SCALES - GENERAL
PAINLEVEL_OUTOF10: 4
PAINLEVEL_OUTOF10: 7
PAINLEVEL_OUTOF10: 4
PAINLEVEL_OUTOF10: 4
PAINLEVEL_OUTOF10: 7
PAINLEVEL_OUTOF10: 6
PAINLEVEL_OUTOF10: 4
PAINLEVEL_OUTOF10: 6
PAINLEVEL_OUTOF10: 7
PAINLEVEL_OUTOF10: 4
PAINLEVEL_OUTOF10: 6

## 2018-02-07 ASSESSMENT — GAIT ASSESSMENTS
GAIT LEVEL OF ASSIST: SUPERVISED
DISTANCE (FEET): 150

## 2018-02-07 ASSESSMENT — COGNITIVE AND FUNCTIONAL STATUS - GENERAL
MOBILITY SCORE: 21
SUGGESTED CMS G CODE MODIFIER MOBILITY: CJ
TURNING FROM BACK TO SIDE WHILE IN FLAT BAD: UNABLE

## 2018-02-07 NOTE — DISCHARGE PLANNING
Order placed for SNF.  SW met with pt at bedside.  Pt chose [facility].  SW faxed choice to [facility].  Per bedside RN, pt will be ready in a few days.

## 2018-02-07 NOTE — DISCHARGE PLANNING
Call from [facility] with [facility], patient has been accepted and can transfer patient today if patient is medical cleared.     AMY Leon has been notified.

## 2018-02-07 NOTE — CARE PLAN
Problem: Hyperinflation:  Goal: Prevent or improve atelectasis  Outcome: PROGRESSING AS EXPECTED  PEP BID IS 2000

## 2018-02-07 NOTE — PROGRESS NOTES
Pulmonary Critical Care Progress Note        Date of Service:  2/7/2018  Chief Complaint: Worsening intermittent chest pain    History of Present Illness: 57 y.o. male admitted on 2/2/2018 for NSTEMI undergoing cardiac catheterization demonstrating multivessel coronary artery disease who underwent coronary artery bypass grafting x 3 on 2/4/2018 who came to the ICU for post operative ventilator management     ROS: no complaints today, no BM yet, but feels it is close    Interval Events:  24 hour interval history reviewed    - POD#4 for CABGx3   - a/ox4   - no overnight events   - pain well controlled   - SR 80s   - SBP 110s   - afebrile   - No BM since admit   - regular diet and tolerating well   - ambulating in the ICU   - ? SNF today   - no respiratory issues    Yesterday's Events:   - POD#3 for CABGx3   - a/ox4   - SR 60-90s   - SBP 120s   - good appetite and tolerating diet   - passing gas, but no BM yet   - no CXR today   - IS at 2000cc   - ambulating around ICU    PFSH:  No change.    Respiratory:     Pulse Oximetry: 97 %    Exam: no distress, clear throughout, no wheezing (no changes)  ImagingAvailable data reviewed         Invalid input(s): [id number]    HemoDynamics:  Pulse: 73, Heart Rate (Monitored): 86  Blood Pressure: (!) 95/62, NIBP: 104/65      Exam: regular rate and rhythm, no murmur, good cap refill, 2+ dpp=, no pedal edema (no changes)  Imaging: Available data reviewed        Neuro:  GCS Total Debora Coma Score: 15       Exam: clear speech, symmetrical facial expressions, motor/sensory intact, answering all appropriately (no changes)  Imaging: Available data reviewed    Fluids:  Intake/Output       02/05/18 0700 - 02/06/18 0659 02/06/18 0700 - 02/07/18 0659 02/07/18 0700 - 02/08/18 0659      1643-5171 7020-1512 Total 1109-5879 6146-3305 Total 3661-0689 2476-1574 Total       Intake    P.O.  1500  450 1950  1440  850 2290  --  -- --    P.O. 1380 251 3913 0340 823 7822 -- -- --    Total Intake  0877 031 3383 9683 222 1881 -- -- --       Output    Urine  2325  3000 5325  1200  600 1800  --  -- --    Number of Times Voided 2 x 4 x 6 x 5 x -- 5 x -- -- --    Indwelling Cathether -- 3000 3000 -- -- -- -- -- --    Void (ml) 2325 -- 2325 0080 812 8741 -- -- --    Stool  --  -- --  --  -- --  --  -- --    Number of Times Stooled 0 x -- 0 x 0 x -- 0 x -- -- --    Total Output 2325 3000 5325 7623 551 0409 -- -- --       Net I/O     -826 -9020 -0184 240 250 490 -- -- --           Recent Labs      18   044   SODIUM  135  139  136   POTASSIUM  4.0  4.0  3.7   CHLORIDE  105  108  105   CO2  22  22  24   BUN  15  14  15   CREATININE  1.02  0.94  0.98   CALCIUM  8.3*  8.0*  8.2*       GI/Nutrition:  Exam: (+)bs, soft, NT/ND, no masses (no changes)  Imaging: Available data reviewed  Tolerating regular diet  Liver Function  Recent Labs      18   044   GLUCOSE  120*  110*  123*       Heme:  Recent Labs      18   044   RBC  3.47*  3.22*  3.03*   HEMOGLOBIN  11.9*  11.1*  10.1*   HEMATOCRIT  34.3*  31.6*  29.8*   PLATELETCT  182  162*  188       Infectious Disease:  Temp  Av.7 °C (98.1 °F)  Min: 36.4 °C (97.6 °F)  Max: 37 °C (98.6 °F)  Micro: reviewed  Recent Labs      18   044   WBC  14.2*  8.7  7.7     Current Facility-Administered Medications   Medication Dose Frequency Provider Last Rate Last Dose   • carvedilol (COREG) tablet 3.125 mg  3.125 mg BID WITH MEALS Sandra Dumont A.P.N.   3.125 mg at 18 1611   • enalapril (VASOTEC) tablet 2.5 mg  2.5 mg Q DAY Sandra Dumont, A.P.N.   2.5 mg at 18 1129   • Respiratory Care per Protocol   Continuous RT Sandra Dumont, A.P.N.       • NS infusion   Continuous Sandra Dumont A.P.N. 10 mL/hr at 18 0700     • Pharmacy Consult Request ...Pain Management Review 1 Each  1 Each PRN Sandra Dumont,  A.P.N.       • docusate sodium (COLACE) capsule 100 mg  100 mg QAM Sandra Dumont, A.P.N.   100 mg at 02/06/18 0755    And   • senna-docusate (PERICOLACE or SENOKOT S) 8.6-50 MG per tablet 1 Tab  1 Tab Nightly Sandra Dumont A.P.N.   1 Tab at 02/05/18 2051    And   • senna-docusate (PERICOLACE or SENOKOT S) 8.6-50 MG per tablet 1 Tab  1 Tab Q24HRS PRN Sandra Dumont, A.P.N.        And   • lactulose 20 GM/30ML solution 30 mL  30 mL Q24HRS PRN Sandra Dumont, A.P.N.        And   • bisacodyl (DULCOLAX) suppository 10 mg  10 mg Q24HRS PRN Sandra Dumont, A.P.N.        And   • fleet enema 133 mL  1 Each Once PRN Sandra Dumont, A.P.N.       • enoxaparin (LOVENOX) inj 40 mg  40 mg DAILY Sandra Dumont, A.P.N.   40 mg at 02/06/18 0755   • aspirin EC (ECOTRIN) tablet 81 mg  81 mg DAILY Sandra Dumont, A.P.N.   81 mg at 02/06/18 0756   • clopidogrel (PLAVIX) tablet 75 mg  75 mg DAILY Sandra Dumont, A.P.N.   75 mg at 02/06/18 0756   • oxyCODONE immediate release (ROXICODONE) tablet 5 mg  5 mg Q3HRS PRN Sandra Dumont, A.P.N.   5 mg at 02/04/18 1301   • oxyCODONE immediate release (ROXICODONE) tablet 10 mg  10 mg Q3HRS PRN Sandra Dumont, A.P.N.   10 mg at 02/07/18 0437   • tramadol (ULTRAM) 50 MG tablet 50 mg  50 mg Q4HRS PRN Sandra Dumont, A.P.N.   50 mg at 02/07/18 0238   • ondansetron (ZOFRAN) syringe/vial injection 4 mg  4 mg Q6HRS PRN Sandra M. White, A.P.N.   4 mg at 02/03/18 1636    Or   • prochlorperazine (COMPAZINE) injection 10 mg  10 mg Q6HRS PRN Sandra Dumont, A.P.N.        Or   • promethazine (PHENERGAN) suppository 25 mg  25 mg Q6HRS PRN Sandra Dumont, A.P.N.       • acetaminophen (TYLENOL) tablet 650 mg  650 mg Q4HRS PRN Sandra Dumont, A.P.N.        Or   • acetaminophen (TYLENOL) suppository 650 mg  650 mg Q4HRS PRN Sandra Dumont, A.P.N.       • mag hydrox-al hydrox-simeth (MAALOX PLUS ES or MYLANTA DS) suspension 30 mL  30 mL Q4HRS PRN Sandra Dumont, A.P.N.       • diphenhydrAMINE  (BENADRYL) tablet/capsule 25 mg  25 mg HS PRN - MR X 1 Sandra Dumont, A.P.N.       • HYDROcodone-acetaminophen (NORCO) 5-325 MG per tablet 1-2 Tab  1-2 Tab Q4HRS PRN Sandra Dumont, A.P.N.   2 Tab at 02/03/18 1646   • atorvastatin (LIPITOR) tablet 80 mg  80 mg QHS Spike Linares M.D.   80 mg at 02/06/18 2050     Last reviewed on 2/3/2018  8:26 AM by Marietta Benitez R.N.    Quality  Measures:  EKG reviewed, Medications reviewed, Labs reviewed and Radiology images reviewed  Bashir catheter: Critically Ill - Requiring Accurate Measurement of Urinary Output  Central line in place: Need for access, Vasopressors and Concentrated IV drugs    DVT Prophylaxis: Enoxaparin (Lovenox)  DVT prophylaxis - mechanical: SCDs  Ulcer prophylaxis: Not indicated          Problems/Plan:    Pos Operative Ventilator Management   - intubated for surgery   - extubated 2/4 per protocol   - cont RT/O2 protocols   - cont IS/PEP and mobilization  Multivessel CAD s/p 3-vessel CABG   - cont ASA/Plavix   - cont statin   - discontinued chest tubes/wires   - mobilization  Hypertension   - cont metoprolol  CAD/NSTEMI   - cont statin   - cont ASA/plavix  Acute Leukocytosis   - likely reactive   - no fever   - monitoring  Mild thrombocytosis   - improving    Pt continues to remain stable with an unchanged exam, assessment, and plan.      Discussed patient condition and risk of morbidity and/or mortality with RN, RT, Therapies, Pharmacy, Dietary, , Charge nurse / hot rounds, Patient and [facility].    [id number]

## 2018-02-07 NOTE — DISCHARGE PLANNING
Call from [place] with [facility], patient has been accepted for SNF services and pending bed. Bed may become available as soon as 2/8.

## 2018-02-07 NOTE — CARE PLAN
Problem: Post Op Day 1 CABG/Heart Valve Replacement  Goal: Optimal care of the post op CABG/heart valve replacement Post Op Day 1    Intervention: Daily Weights  Complete  Intervention: Up in chair for all meals  Complete  Intervention: IS q 1 hour while awake and record best IS volume  Best IS 2000      Problem: Post op day 2 CABG/Heart Valve Replacement  Goal: Optimal care of the post op CABG/heart valve replacement post op day 2    Intervention: Ambulate, increasing the distance each time x 3 and before bed  Patient ambulated x3, increasing distance each time.       Problem: Post Op Day 4 CABG/Heart Valve Replacement  Goal: Optimal care of the Post Op CABG/Heart Valve replacement Post Op Day 4    Intervention: Shower daily and clean incisions twice daily with soap and water  Complete  Intervention: Consider removal of rodriguez, chest tube and pacer wire if not already done  Complete      Problem: Post Op Day 3 CABG/Heart Valve replacement  Goal: Optimal care of the post op CABG/Heart Valve replacement post op day 3  Outcome: PROGRESSING AS EXPECTED    Intervention: Case management and  for discharge needs  Case management involved.   Intervention: Discharge planning (See discharge barriers/planning problem on care plan)  Discharge planning started. Patient researched SNF's to go to and communicated that to SW.   Intervention: Daily Weights  Complete  Intervention: Shower daily and clean incisions twice daily with soap and water  Complete  Intervention: Up in chair for all meals  Complete  Intervention: Ambulate, increasing the distance each time x 3 and before bed  Patient ambulated x3, increasing distance each time.   Intervention: IS q 1 hour while awake and record best IS volume  Best IS 2000  Intervention: Consider removal of rodriguez, chest tube and pacer wire if not already done  Complete

## 2018-02-07 NOTE — CARE PLAN
Problem: Nutritional:  Goal: Achieve adequate nutritional intake  Patient will consume >50% of meals   Outcome: MET Date Met: 02/07/18    Intervention: Monitor PO intake, weights, and laboratory values  ADL documentation shows pt has been consistently eating >50% of meals and RN reported good intake in rounds this morning.    RD available prn

## 2018-02-07 NOTE — THERAPY
"Physical Therapy Treatment completed.   Bed Mobility:  Supine to Sit: Supervised  Transfers: Sit to Stand: Supervised  Gait: Level Of Assist: Supervised with No Equipment Needed       Plan of Care: Patient with no further skilled PT needs in the acute care setting at this time  Discharge Recommendations: Equipment: No Equipment Needed.     See \"Rehab Therapy-Acute\" Patient Summary Report for complete documentation.       "

## 2018-02-07 NOTE — DISCHARGE PLANNING
Received choice form from AMY Leon for patients SNF services, referral has been sent to [facility], [facility] and [facility] per patient request.

## 2018-02-07 NOTE — PROGRESS NOTES
Cardiovascular Surgery Progress Note    Name: Anthony Reyes  MRN: [id number]  : 1960  Admit Date: 2018  2:56 PM  Procedure:  Procedure(s) and Anesthesia Type:     * MULTIPLE CORONARY ARTERY BYPASS X3, ENDO VEIN HARVEST RIGHT LOWER LEG - General     * PB - General  4 Day Post-Op    Vitals:  Patient Vitals for the past 8 hrs:   Temp SpO2 O2 Delivery O2 (LPM) Pulse Heart Rate (Monitored) Resp BP Weight   18 0800 36.8 °C (98.2 °F) 95 % None (Room Air) 0 82 82 16 116/70 -   18 0425 - 93 % - - - - - - -   18 0424 - 95 % - - - - - - -   18 0423 - 97 % - - - - - - -   18 0400 37 °C (98.6 °F) 97 % None (Room Air) 0 73 - 16 (!) 95/62 91.9 kg (202 lb 9.6 oz)     Temp (24hrs), Av.7 °C (98.1 °F), Min:36.4 °C (97.6 °F), Max:37 °C (98.6 °F)      Respiratory:    Respiration: 16, Pulse Oximetry: 95 %, O2 Daily Delivery Respiratory : Room Air with O2 Available     Chest Tube Drains:          Fluids:    Intake/Output Summary (Last 24 hours) at 18 0840  Last data filed at 18 0600   Gross per 24 hour   Intake             1930 ml   Output             2200 ml   Net             -270 ml     Admit weight: Weight: 93.4 kg (206 lb)  Current weight: Weight: 91.9 kg (202 lb 9.6 oz) (18 0400)    Labs:  Recent Labs      18   0450  18   0430  18   0440   WBC  14.2*  8.7  7.7   RBC  3.47*  3.22*  3.03*   HEMOGLOBIN  11.9*  11.1*  10.1*   HEMATOCRIT  34.3*  31.6*  29.8*   MCV  98.8*  98.1*  98.3*   MCH  34.3*  34.5*  33.3*   MCHC  34.7  35.1  33.9   RDW  43.8  42.2  42.4   PLATELETCT  182  162*  188   MPV  11.3  11.0  11.5         Recent Labs      18   0450  18   0430  18   0440   SODIUM  135  139  136   POTASSIUM  4.0  4.0  3.7   CHLORIDE  105  108  105   CO2  22  22  24   GLUCOSE  120*  110*  123*   BUN  15  14  15   CREATININE  1.02  0.94  0.98   CALCIUM  8.3*  8.0*  8.2*           Medications:  • carvedilol  3.125 mg     • enalapril  2.5 mg     •  docusate sodium  100 mg      And   • senna-docusate  1 Tab     • enoxaparin  40 mg     • aspirin EC  81 mg     • clopidogrel  75 mg     • atorvastatin  80 mg         Exam:   Review of Systems   Constitutional: Negative.    HENT: Negative.    Eyes: Negative.    Respiratory: Negative.    Cardiovascular: Negative.    Gastrointestinal: Negative.    Genitourinary: Negative.    Musculoskeletal: Negative.    Skin: Negative.    Neurological: Negative.    Endo/Heme/Allergies: Negative.    Psychiatric/Behavioral: Negative.        Physical Exam   Constitutional: He is oriented to person, place, and time. He appears well-developed and well-nourished. No distress.   Neck: Neck supple.   Cardiovascular: Normal rate, regular rhythm and normal heart sounds.  Exam reveals no gallop and no friction rub.    No murmur heard.  Pulmonary/Chest: Effort normal. No respiratory distress. He has decreased breath sounds in the right lower field and the left lower field. He has no wheezes. He has no rales.   Abdominal: Soft. He exhibits no distension. There is no tenderness.   Neurological: He is alert and oriented to person, place, and time.   Skin: Skin is warm and dry. He is not diaphoretic.       Quality Measures:     Reviewed items::  EKG reviewed, Labs reviewed, Medications reviewed and Radiology images reviewed  Rodriguez catheter::  One or Two Days Post Surgery (Day of Surgery being Day 0)  Central line in place:  Need for access  DVT prophylaxis pharmacological::  Enoxaparin (Lovenox) and Not indicated at this time, ambulatory  DVT prophylaxis - mechanical:  SCDs  Ulcer Prophylaxis::  Yes      Assessment/Plan:  POD 1 HDS, SR, d/c ct/rodriguez, amb, enc IS  POD 2 HDS, NSR, 2 L NC. Passing gas no BM yet, feeling good.  2Kg above baseline, PLAN: Bowel protocol, add low dose diuresis. AMB/IS.   POD 3 HDS, SR, room air, cont diuresis, amb, enc IS, plan transfer to skilled tomorrow  POD 4 HDS, SR, Room air, diuresed well, ambulating, transfer to SNF  today    Patient seen, examined and plan reviewed with midlevel provider. I agree with the plan.      Active Hospital Problems    Diagnosis   • ACS (acute coronary syndrome) (CMS-HCC) [I24.9]

## 2018-02-07 NOTE — PROGRESS NOTES
Received bedside report from Gloria ANGEL, patient sitting comfortably in chair, states 0/10 pain, patient is on RA, and saline locked, lines assessed, call light within reach, education provided regarding importance of calling, assumed patient care.

## 2018-02-07 NOTE — DISCHARGE SUMMARY
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    PROCEDURES  2/3/2018 - Coronary artery bypass grafting x3 (left internal   mammary artery to the left anterior descending artery and reverse saphenous   vein grafts to the diagonal artery and distal left circumflex artery),   right endoscopic vein harvest, and intraoperative transesophageal   echocardiography.    HPI   The patient is a pleasant 57-year-old male with an acute non-ST   elevation myocardial infarction.  Cardiac catheterization showed 2-vessel   coronary artery disease.  His left ventricular ejection fraction was   approximately 30-40%.  Echocardiography showed severely depressed left   ventricular ejection fraction of approximately 20%.  There were no valvular   abnormalities.    HOSPITAL COURSE  POD 1 HDS, SR, d/c ct/rodriguez, amb, enc IS  POD 2 HDS, NSR, 2 L NC. Passing gas no BM yet, feeling good.  2Kg above baseline, PLAN: Bowel protocol, add low dose diuresis. AMB/IS.   POD 3 HDS, SR, room air, cont diuresis, amb, enc IS, plan transfer to skilled tomorrow  POD 4 HDS, SR, Room air, diuresed well, ambulating, transfer to SNF today     Therefore, he is discharged in good and stable condition with close outpatient follow-up.      FOLLOW UP  Cardiology 1 weeks  [facility]  3/12/2018 12:30 pm      MEDICATIONS ON DISCHARGE   Anthony Reyes   Home Medication Instructions RENAN:[id number]    Printed on:02/07/18 0848   Medication Information                      aspirin EC 81 MG EC tablet  Take 1 Tab by mouth every day.             atorvastatin (LIPITOR) 80 MG tablet  Take 1 Tab by mouth every bedtime.             carvedilol (COREG) 3.125 MG Tab  Take 1 Tab by mouth 2 times a  day.             clopidogrel (PLAVIX) 75 MG Tab  Take 1 Tab by mouth every day.             enalapril (VASOTEC) 2.5 MG Tab  Take 1 Tab by mouth every day.             omeprazole (PRILOSEC) 40 MG delayed-release capsule  Take 40 mg by mouth Once.             oxyCODONE immediate release (ROXICODONE) 10 MG immediate release tablet  Take 1 Tab by mouth every four hours as needed for up to 5 days.             [facility] website was checked. Discussed with patient the risks/benefits of narcotic pain medication, side effects, addiction, and expectations for length of use. Informed consent was obtained.    DIET  Cardiac Diet    ACTIVITY  As tolerated.   Sternal precautions  10-lb lifting restriction, no pushing or pulling with arms  No driving  Call if: 1. any redness, swelling or drainage of incisions. 2. Increased weight gain greater than 2 pounds in 1 day. 3. Uncontrolled pain. 4. Shortness of breath or increased work of breathing.      LABORATORY  Lab Results   Component Value Date/Time    SODIUM 136 02/07/2018 04:40 AM    POTASSIUM 3.7 02/07/2018 04:40 AM    CHLORIDE 105 02/07/2018 04:40 AM    CO2 24 02/07/2018 04:40 AM    GLUCOSE 123 (H) 02/07/2018 04:40 AM    BUN 15 02/07/2018 04:40 AM    CREATININE 0.98 02/07/2018 04:40 AM        Lab Results   Component Value Date/Time    WBC 7.7 02/07/2018 04:40 AM    HEMOGLOBIN 10.1 (L) 02/07/2018 04:40 AM    HEMATOCRIT 29.8 (L) 02/07/2018 04:40 AM    PLATELETCT 188 02/07/2018 04:40 AM

## 2018-02-07 NOTE — CARE PLAN
Problem: Post Op Day 4 CABG/Heart Valve Replacement  Goal: Optimal care of the Post Op CABG/Heart Valve replacement Post Op Day 4    Intervention: Daily Weights  Pt weighed this morning.   Intervention: Up in chair for all meals  Pt agrees to be up in chair for breakfast.   Intervention: Ambulate, increasing the distance each time x 3 and before bed  Pt ambulated around entire unit twice.   Intervention: IS q 1 hour while awake and record best IS volume  Best IS 2250.  Intervention: Discharge Education  Pt educated about new meds including atorvastatin and plavix.

## 2018-02-08 NOTE — DISCHARGE PLANNING
Per direction from AMY Leon, transportation can be arranged for patient to transfer to [place]. Call placed to [facility] with [place] and transport time has been arranged for 1730. [employer] will provide transportation for patient to [place].    AMY Leon has been notified.

## 2018-02-08 NOTE — PROGRESS NOTES
Patient transported with Med transport to [place], belongings are with patient, patient going via wheelchair, discharge paperwork complete and signed.

## 2018-02-08 NOTE — DISCHARGE PLANNING
SW met with pt at bedside.  Pt would like to go to [place].  Pt signed the COBRA form.    Pt will be leaving at 1730.    PASRR [id number]

## 2018-02-08 NOTE — DISCHARGE INSTRUCTIONS
Discharge Instructions    Discharged to [place] by medical transportatin with escort. Discharged via wheelchair, hospital escort: Yes.  Special equipment needed: Not Applicable    Be sure to schedule a follow-up appointment with your primary care doctor or any specialists as instructed.     Discharge Plan:   Influenza Vaccine Indication: Patient Refuses    I understand that a diet low in cholesterol, fat, and sodium is recommended for good health. Unless I have been given specific instructions below for another diet, I accept this instruction as my diet prescription.   Other diet: Cardiac    Special Instructions: heart surgery, acute coronary syndrome, and heart failure    [facility] Discharge Instructions    Activity:  1. NO driving for 4 weeks after surgery. You may ride as a passenger.  2. NO lifting more than 10 pounds for 6 weeks.  3. For the next 6 weeks, keep your elbows close to your body and move within a pain-free motion when lifting, pushing or pulling.  Do not stretch both arms backwards at the same time.    4. Walk at least 4 times per day, there is no maximum.  5. Other physical activities (sex, housework, gardening, etc.) are okay after 4 weeks.  6. Continue using incentive spirometer for 2 weeks.  If you are going home on oxygen and you were not on oxygen prior to surgery, keep using until you are oxygen free.  7. Weigh yourself daily.  Call your Cardiologist for a weight gain of 2 or more pounds within 48 hours.    Incision Care:  1. SHOWER EVERYDAY-no baths. Make sure to clean your incision(s) twice daily with plain, perfume and dye-free soap.  Then pat incision(s) dry with clean towel. No creams or lotions on your incision(s).    2. If you are discharging with a Prevena bandage on your chest, you or your home health nurse may remove the bandage 7 days after surgery, or sooner if the battery runs out or the device alarms. When the battery runs out, the bandage will lose suction and  it will puff up.  To remove, slowly roll down the edges of the bandage. Throw away the entire bandage and the battery pack.  Keep the incision open to air and clean twice daily with soap and water.  3. If there is any increased redness or swelling, separation of the incision line, or thick drainage from any of your incisions, call [facility].    4. Continue to wear your ALEXI Stockings for 4 weeks. You may take them off when you are in bed or when your legs are elevated.    General Instructions:  1. You have been referred to Cardiac Rehab.  You can start Cardiac Rehab 30 days after surgery.  If you do not have an appointment at the time of discharge call [phone] to schedule an appointment.  2. Your Primary Care Doctor typically handles home oxygen. Oxygen may be stopped when your oxygen level is consistently greater than 90.  Check with your Primary Care Doctor if you are unsure.  3. Take all of your medications (including pain medications) as prescribed.  Taking medications other than prescribed can result in serious injury.    For Patients Discharged with Narcotic Pain Medication:   1. If a refill is needed, understand that only 1 refill will be provided and you must come to the [facility]’ office for an appointment (72 hours’ notice is required to schedule and there are no weekend appointments).  2. If the pain medications you are discharged on are not working, you will need to bring your remaining prescription into the office in order to receive a new prescription.  3. If you were taking narcotics prior to your heart surgery, [facility] will provide you with one prescription and additional medications will need to be provided by your pain management doctor.  4. Do not drink alcohol while taking narcotics.  5. Lost or stolen medications will not be refilled.  If medications are stolen, report to law enforcement.    Contact [facility] at [phone] if you have any  questions.    Acute Coronary Syndrome (ACS) is a diagnosis that encompasses cardiac-related chest pain and heart attack. ACS occurs when the blood flow to the heart muscle is severely reduced or cut off completely due to a slow process called atherosclerosis.  Atherosclerosis is a disease in which the coronary arteries become narrow from a buildup of fat, cholesterol, and other substances that combine to form plaque. If the plaque breaks, a blood clot will form and block the blood flow to the heart muscle. This lack of blood flow can cause damage or death to the heart muscle which is called a heart attack or Myocardial Infarction (MI). There are two different types of MIs:  ST Elevation Myocardial Infarction or STEMI (the most severe type of heart attack) and Non-ST Elevation Myocardial Infarction or NSTEMI.    Treatment Plan:  · Cardiac Diet  - Low fat, low salt, low cholesterol   · Cardiac Rehab  - Your doctor has ordered you a referral to [facility].  Call [phone] to schedule an appointment.  · Attend my follow-up appointment with my Cardiologist.  · Take my medications as prescribed by my doctor  · Exercise daily  · Reduce stress    Medications:  Certain medications are used to treat ACS.  Remember to always take medications as prescribed and never stop talking medications unless told by your doctor.    HF Patient Discharge Instructions  · Monitor your weight daily, and maintain a weight chart, to track your weight changes.   · Activity as tolerated, unless your Doctor has ordered otherwise. Other activity order: as per heart surgery.  · Follow a low fat, low cholesterol, low salt diet unless instructed otherwise by your Doctor. Read the labels on the back of food products and track your intake of fat, cholesterol and salt.   · Fluid Restriction No. If a Fluid Restriction has been ordered by your Doctor, measure fluids with a measuring cup to ensure that you are not exceeding the restriction.   · No  smoking.  · Oxygen Yes. If your Doctor has ordered that you wear Oxygen at home, it is important to wear it as ordered.  · Did you receive an explanation from staff on the importance of taking each of your medications and why it is necessary to keep taking them unless your doctor says to stop? Yes  · Were all of your questions answered about how to manage your heart failure and what to do if you have increased signs and symptoms after you go home? Yes  · Do you feel like your heart failure care team involved you in the care treatment plan and allowed you to make decisions regarding your care while in the hospital and addressed any discharge needs you might have? Yes    See the educational handout provided at discharge for more information on monitoring your daily weight, activity and diet. This also explains more about Heart Failure, symptoms of a flare-up and some of the tests that you have undergone.     Warning Signs of a Flare-Up include:  · Swelling in the ankles or lower legs.  · Shortness of breath, while at rest, or while doing normal activities.   · Shortness of breath at night when in bed, or coughing in bed.   · Requiring more pillows to sleep at night, or needing to sit up at night to sleep.  · Feeling weak, dizzy or fatigued.     When to call your Doctor:  · [facility] about questions regarding the discharge instructions you were given [phone].  · Call your Primary Care Physician or Cardiologist if:   1. You experience any pain radiating to your jaw or neck.  2. You have any difficulty breathing.  3. You experience weight gain of 3 lbs in a day or 5 lbs in a week.   4. You feel any palpitations or irregular heartbeats.  5. You become dizzy or lose consciousness.   If you have had an angiogram or had a pacemaker or AICD placed, and experience:  1. Bleeding, drainage or swelling at the surgical / puncture site.  2. Fever greater than 100.0 F  3. Shock from internal  defibrillator.  4. Cool and / or numb extremities.    · Is patient discharged on Warfarin / Coumadin?   No   Depression / Suicide Risk    As you are discharged from this [facility] facility, it is important to learn how to keep safe from harming yourself.    Recognize the warning signs:  · Abrupt changes in personality, positive or negative- including increase in energy   · Giving away possessions  · Change in eating patterns- significant weight changes-  positive or negative  · Change in sleeping patterns- unable to sleep or sleeping all the time   · Unwillingness or inability to communicate  · Depression  · Unusual sadness, discouragement and loneliness  · Talk of wanting to die  · Neglect of personal appearance   · Rebelliousness- reckless behavior  · Withdrawal from people/activities they love  · Confusion- inability to concentrate     If you or a loved one observes any of these behaviors or has concerns about self-harm, here's what you can do:  · Talk about it- your feelings and reasons for harming yourself  · Remove any means that you might use to hurt yourself (examples: pills, rope, extension cords, firearm)  · Get professional help from the community (Mental Health, Substance Abuse, psychological counseling)  · Do not be alone:Call your Safe Contact- someone whom you trust who will be there for you.  · Call your local CRISIS HOTLINE 555-1050 or 701-422-8993  · Call your local Children's Mobile Crisis Response Team [place] (174) 860-6964 or www.[employer]  · Call the toll free National Suicide Prevention Hotlines   · National Suicide Prevention Lifeline 264-511-QAKF (4077)  · National Hope Line Network 800-SUICIDE (732-8834)

## 2018-02-20 ENCOUNTER — OFFICE VISIT (OUTPATIENT)
Dept: CARDIOLOGY | Facility: MEDICAL CENTER | Age: 58
End: 2018-02-20
Payer: MEDICARE

## 2018-02-20 VITALS
HEART RATE: 88 BPM | SYSTOLIC BLOOD PRESSURE: 88 MMHG | OXYGEN SATURATION: 95 % | BODY MASS INDEX: 25.15 KG/M2 | HEIGHT: 74 IN | DIASTOLIC BLOOD PRESSURE: 64 MMHG | WEIGHT: 196 LBS

## 2018-02-20 DIAGNOSIS — Z95.1 HX OF CABG: ICD-10-CM

## 2018-02-20 DIAGNOSIS — E78.2 MIXED HYPERLIPIDEMIA: ICD-10-CM

## 2018-02-20 DIAGNOSIS — I25.5 ISCHEMIC CARDIOMYOPATHY: ICD-10-CM

## 2018-02-20 DIAGNOSIS — I25.10 CORONARY ARTERY DISEASE INVOLVING NATIVE CORONARY ARTERY OF NATIVE HEART WITHOUT ANGINA PECTORIS: ICD-10-CM

## 2018-02-20 PROCEDURE — 99214 OFFICE O/P EST MOD 30 MIN: CPT | Performed by: NURSE PRACTITIONER

## 2018-02-20 RX ORDER — INHALER, ASSIST DEVICES
SPACER (EA) MISCELLANEOUS
Refills: 0 | COMMUNITY
Start: 2017-12-21 | End: 2021-08-06

## 2018-02-20 RX ORDER — AMOXICILLIN 250 MG
1 CAPSULE ORAL DAILY
COMMUNITY
End: 2021-08-06

## 2018-02-20 RX ORDER — ALBUTEROL SULFATE 90 UG/1
1-2 AEROSOL, METERED RESPIRATORY (INHALATION) PRN
COMMUNITY
Start: 2018-01-04 | End: 2021-08-06

## 2018-02-20 RX ORDER — OXYCODONE HYDROCHLORIDE 10 MG/1
10 TABLET ORAL PRN
COMMUNITY
End: 2021-08-06

## 2018-02-20 ASSESSMENT — ENCOUNTER SYMPTOMS
WEAKNESS: 0
DIZZINESS: 0
SHORTNESS OF BREATH: 1
ORTHOPNEA: 0
ABDOMINAL PAIN: 0
PALPITATIONS: 0
CLAUDICATION: 0
MYALGIAS: 0
COUGH: 0
PND: 0

## 2018-02-20 NOTE — LETTER
[facility]   [address], [facility] [address]  [place] [address]  Phone: [phone]  Fax: [phone]              Anthony Reyes  1960    Encounter Date: 2018    ISAURO Marr          PROGRESS NOTE:  Subjective:   Anthony Reyes is a 57 y.o. male who presents today for hospital follow-up for coronary artery disease.    He been having exertional chest discomfort for 3 weeks prior to his hospital admission. He had been seen in the emergency room on 5 occasions in [facility]. He was complaining of chest pain with radiation to both arms. He underwent a treadmill stress test in the ER with severe chest discomfort but was told that he needed to exercise more. In that his last ER visit his troponin was positive and he was transferred to [facility].    He underwent cardiac catheterization and was found to have three-vessel disease. On February 3, 2018, he underwent three-vessel bypass. He has ischemic cardiomyopathy with initial ejection fraction between 30 and 40% per echocardiogram. He tells me that the surgeon reported that his heart was beating stronger after revascularization even before chest closure.    Patient had no postop complications. He lives in [place] by himself therefore he has been in a rehabilitation facility and is expected to be discharged home tomorrow.    He complains of incisional chest discomfort especially with coughing or sneezing. He uses incentive spirometer to 2500 cc. He denies any cough or sputum production. He is walking around the facility and doing physical therapy 3 times per day. He does complain of some shortness of breath with exertion.    He is a nonsmoker and has minimal risk factors for coronary artery disease. His mother  young in his father is estranged. However his maternal uncle underwent bypass at age 55 and is alive in his 70s.      .History reviewed. No pertinent past medical  history.  Past Surgical History:   Procedure Laterality Date   • MULTIPLE CORONARY ARTERY BYPASS ENDO VEIN HARVEST  2/3/2018    Procedure: MULTIPLE CORONARY ARTERY BYPASS X3, ENDO VEIN HARVEST RIGHT LOWER LEG;  Surgeon: Fredrick Burger M.D.;  Location: SURGERY [facility];  Service: Cardiothoracic   • PB  2/3/2018    Procedure: PB;  Surgeon: Fredrick Burger M.D.;  Location: SURGERY [facility];  Service: Cardiothoracic   • ANKLE ORIF Right      Family History   Problem Relation Age of Onset   • Heart Disease Maternal Uncle 54     CABG     History   Smoking Status   • Never Smoker   Smokeless Tobacco   • Former User   • Quit date: 1980     Comment: Chewed for 5 years     No Known Allergies  Outpatient Encounter Prescriptions as of 2/20/2018   Medication Sig Dispense Refill   • oxyCODONE immediate release (ROXICODONE) 10 MG immediate release tablet Take 10 mg by mouth every four hours as needed for Moderate Pain.     • senna-docusate (PERICOLACE OR SENOKOT S) 8.6-50 MG Tab Take 1 Tab by mouth every day.     • aspirin EC 81 MG EC tablet Take 1 Tab by mouth every day. 30 Tab    • atorvastatin (LIPITOR) 80 MG tablet Take 1 Tab by mouth every bedtime. 30 Tab    • carvedilol (COREG) 3.125 MG Tab Take 1 Tab by mouth 2 times a day. 60 Tab 3   • clopidogrel (PLAVIX) 75 MG Tab Take 1 Tab by mouth every day. 30 Tab 3   • enalapril (VASOTEC) 2.5 MG Tab Take 1 Tab by mouth every day. 30 Tab    • omeprazole (PRILOSEC) 40 MG delayed-release capsule Take 40 mg by mouth Once.     • VENTOLIN  (90 Base) MCG/ACT Aero Soln inhalation aerosol Inhale 1-2 Puffs by mouth as needed.     • Spacer/Aero-Holding Chambers (OPTICHAMBER EZ) Misc USE AS DIRECTED  0     No facility-administered encounter medications on file as of 2/20/2018.      Review of Systems   Constitutional: Negative for malaise/fatigue.   Respiratory: Positive for shortness of breath (exertion). Negative for cough.    Cardiovascular: Positive for chest  "pain (incisional). Negative for palpitations, orthopnea, claudication, leg swelling and PND.   Gastrointestinal: Negative for abdominal pain.   Musculoskeletal: Negative for myalgias.   Neurological: Negative for dizziness and weakness.        Objective:   BP (!) 88/64   Pulse 88   Ht 1.88 m (6' 2\")   Wt 88.9 kg (196 lb)   SpO2 95%   BMI 25.16 kg/m²      Physical Exam   Constitutional: He is oriented to person, place, and time. He appears well-developed and well-nourished.   HENT:   Head: Normocephalic.   Eyes: Conjunctivae are normal.   Neck: No JVD present. No thyromegaly present.   Cardiovascular: Normal rate, regular rhythm and normal heart sounds.    Pulmonary/Chest: Effort normal and breath sounds normal. He has no wheezes. He has no rales.   Chest incision is well healed without redness or discharge.   Abdominal: Soft. Bowel sounds are normal. He exhibits no distension. There is no tenderness.   Musculoskeletal: He exhibits no edema.   Neurological: He is alert and oriented to person, place, and time.   Skin: Skin is warm and dry.   Psychiatric: He has a normal mood and affect.     Results for SALLIE GRERE (MRN [id number]) as of 2/20/2018 14:00   Ref. Range 2/7/2018 04:40   WBC Latest Ref Range: 4.8 - 10.8 K/uL 7.7   RBC Latest Ref Range: 4.70 - 6.10 M/uL 3.03 (L)   Hemoglobin Latest Ref Range: 14.0 - 18.0 g/dL 10.1 (L)   Hematocrit Latest Ref Range: 42.0 - 52.0 % 29.8 (L)   MCV Latest Ref Range: 81.4 - 97.8 fL 98.3 (H)   MCH Latest Ref Range: 27.0 - 33.0 pg 33.3 (H)   MCHC Latest Ref Range: 33.7 - 35.3 g/dL 33.9   RDW Latest Ref Range: 35.9 - 50.0 fL 42.4   Platelet Count Latest Ref Range: 164 - 446 K/uL 188   MPV Latest Ref Range: 9.0 - 12.9 fL 11.5   Sodium Latest Ref Range: 135 - 145 mmol/L 136   Potassium Latest Ref Range: 3.6 - 5.5 mmol/L 3.7   Chloride Latest Ref Range: 96 - 112 mmol/L 105   Co2 Latest Ref Range: 20 - 33 mmol/L 24   Anion Gap Latest Ref Range: 0.0 - 11.9  7.0   Glucose Latest Ref " Range: 65 - 99 mg/dL 123 (H)   Bun Latest Ref Range: 8 - 22 mg/dL 15   Creatinine Latest Ref Range: 0.50 - 1.40 mg/dL 0.98   GFR If  Latest Ref Range: >60 mL/min/1.73 m 2 >60   GFR If Non  Latest Ref Range: >60 mL/min/1.73 m 2 >60   Calcium Latest Ref Range: 8.5 - 10.5 mg/dL 8.2 (L)   Results for SALLIE GREER (MRN [id number]) as of 2/20/2018 14:00   Ref. Range 2/2/2018 23:09   Cholesterol,Tot Latest Ref Range: 100 - 199 mg/dL 157   Triglycerides Latest Ref Range: 0 - 149 mg/dL 99   HDL Latest Ref Range: >=40 mg/dL 36 (A)   LDL Latest Ref Range: <100 mg/dL 101 (H)     February 2, 2018: Transthoracic Echo Report  1. Severely reduced left ventricular systolic function. Estimated LVEF   20%  2. Global hypokinesis with regional variation. Apical akinesis. The   inferior wall motion is mostly preserved.  3. The right ventricle was normal in size and function.  4. No significant valvular disease  5. Normal estimated right atrial pressure    Assessment:     1. Coronary artery disease involving native coronary artery of native heart without angina pectoris     2. Hx of CABG     3. Ischemic cardiomyopathy     4. Mixed hyperlipidemia  LIPID PROFILE    COMP METABOLIC PANEL       Medical Decision Making:  Today's Assessment / Status / Plan:     Coronary artery disease: Status post 3 vessel bypass. Patient is doing very well postoperatively. His blood pressure is low but he denies any dizziness. I would like him to take enalapril in the evening.    Since he lives in [facility] there is no cardiac rehabilitation available. I would like him to begin walking and work up to 30 minutes 5 days a week. He is agreeable.    Ischemic cardiomyopathy: Low ejection fraction but probably improved with revascularization per the surgeon's report. Patient was discharged without any diuretics. I have cautioned him to avoid excessive salt or fluid intake. I expect that his cardiomyopathy will improve. We  will do echocardiogram in 3-6 months.    Hyperlipidemia: Patient's initial lipids are not very elevated. He will continue on the statin and we'll check lipid metabolic panel in 3 months.    Patient will follow-up with myself in approximately one month and with Dr. Fish in 3 months with lab work prior. He will follow-up sooner if problems.    Collaborating Provider: Dr. Bereket Benoit.        No Recipients

## 2018-02-20 NOTE — PROGRESS NOTES
Subjective:   Anthony Reyes is a 57 y.o. male who presents today for hospital follow-up for coronary artery disease.    He been having exertional chest discomfort for 3 weeks prior to his hospital admission. He had been seen in the emergency room on 5 occasions in [facility]. He was complaining of chest pain with radiation to both arms. He underwent a treadmill stress test in the ER with severe chest discomfort but was told that he needed to exercise more. In that his last ER visit his troponin was positive and he was transferred to [facility].    He underwent cardiac catheterization and was found to have three-vessel disease. On February 3, 2018, he underwent three-vessel bypass. He has ischemic cardiomyopathy with initial ejection fraction between 30 and 40% per echocardiogram. He tells me that the surgeon reported that his heart was beating stronger after revascularization even before chest closure.    Patient had no postop complications. He lives in [place] by himself therefore he has been in a rehabilitation facility and is expected to be discharged home tomorrow.    He complains of incisional chest discomfort especially with coughing or sneezing. He uses incentive spirometer to 2500 cc. He denies any cough or sputum production. He is walking around the facility and doing physical therapy 3 times per day. He does complain of some shortness of breath with exertion.    He is a nonsmoker and has minimal risk factors for coronary artery disease. His mother  young in his father is estranged. However his maternal uncle underwent bypass at age 55 and is alive in his 70s.      .History reviewed. No pertinent past medical history.  Past Surgical History:   Procedure Laterality Date   • MULTIPLE CORONARY ARTERY BYPASS ENDO VEIN HARVEST  2/3/2018    Procedure: MULTIPLE CORONARY ARTERY BYPASS X3, ENDO VEIN HARVEST RIGHT LOWER LEG;  Surgeon: Fredrick Burger M.D.;  Location: SURGERY [facility]  ORS;  Service: Cardiothoracic   • PB  2/3/2018    Procedure: PB;  Surgeon: Fredrick Burger M.D.;  Location: SURGERY [facility] ORS;  Service: Cardiothoracic   • ANKLE ORIF Right      Family History   Problem Relation Age of Onset   • Heart Disease Maternal Uncle 54     CABG     History   Smoking Status   • Never Smoker   Smokeless Tobacco   • Former User   • Quit date: 1980     Comment: Chewed for 5 years     No Known Allergies  Outpatient Encounter Prescriptions as of 2/20/2018   Medication Sig Dispense Refill   • oxyCODONE immediate release (ROXICODONE) 10 MG immediate release tablet Take 10 mg by mouth every four hours as needed for Moderate Pain.     • senna-docusate (PERICOLACE OR SENOKOT S) 8.6-50 MG Tab Take 1 Tab by mouth every day.     • aspirin EC 81 MG EC tablet Take 1 Tab by mouth every day. 30 Tab    • atorvastatin (LIPITOR) 80 MG tablet Take 1 Tab by mouth every bedtime. 30 Tab    • carvedilol (COREG) 3.125 MG Tab Take 1 Tab by mouth 2 times a day. 60 Tab 3   • clopidogrel (PLAVIX) 75 MG Tab Take 1 Tab by mouth every day. 30 Tab 3   • enalapril (VASOTEC) 2.5 MG Tab Take 1 Tab by mouth every day. 30 Tab    • omeprazole (PRILOSEC) 40 MG delayed-release capsule Take 40 mg by mouth Once.     • VENTOLIN  (90 Base) MCG/ACT Aero Soln inhalation aerosol Inhale 1-2 Puffs by mouth as needed.     • Spacer/Aero-Holding Chambers (OPTICHAMBER EZ) Misc USE AS DIRECTED  0     No facility-administered encounter medications on file as of 2/20/2018.      Review of Systems   Constitutional: Negative for malaise/fatigue.   Respiratory: Positive for shortness of breath (exertion). Negative for cough.    Cardiovascular: Positive for chest pain (incisional). Negative for palpitations, orthopnea, claudication, leg swelling and PND.   Gastrointestinal: Negative for abdominal pain.   Musculoskeletal: Negative for myalgias.   Neurological: Negative for dizziness and weakness.        Objective:   BP (!) 88/64    "Pulse 88   Ht 1.88 m (6' 2\")   Wt 88.9 kg (196 lb)   SpO2 95%   BMI 25.16 kg/m²     Physical Exam   Constitutional: He is oriented to person, place, and time. He appears well-developed and well-nourished.   HENT:   Head: Normocephalic.   Eyes: Conjunctivae are normal.   Neck: No JVD present. No thyromegaly present.   Cardiovascular: Normal rate, regular rhythm and normal heart sounds.    Pulmonary/Chest: Effort normal and breath sounds normal. He has no wheezes. He has no rales.   Chest incision is well healed without redness or discharge.   Abdominal: Soft. Bowel sounds are normal. He exhibits no distension. There is no tenderness.   Musculoskeletal: He exhibits no edema.   Neurological: He is alert and oriented to person, place, and time.   Skin: Skin is warm and dry.   Psychiatric: He has a normal mood and affect.     Results for SALLIE GREER (MRN [id number]) as of 2/20/2018 14:00   Ref. Range 2/7/2018 04:40   WBC Latest Ref Range: 4.8 - 10.8 K/uL 7.7   RBC Latest Ref Range: 4.70 - 6.10 M/uL 3.03 (L)   Hemoglobin Latest Ref Range: 14.0 - 18.0 g/dL 10.1 (L)   Hematocrit Latest Ref Range: 42.0 - 52.0 % 29.8 (L)   MCV Latest Ref Range: 81.4 - 97.8 fL 98.3 (H)   MCH Latest Ref Range: 27.0 - 33.0 pg 33.3 (H)   MCHC Latest Ref Range: 33.7 - 35.3 g/dL 33.9   RDW Latest Ref Range: 35.9 - 50.0 fL 42.4   Platelet Count Latest Ref Range: 164 - 446 K/uL 188   MPV Latest Ref Range: 9.0 - 12.9 fL 11.5   Sodium Latest Ref Range: 135 - 145 mmol/L 136   Potassium Latest Ref Range: 3.6 - 5.5 mmol/L 3.7   Chloride Latest Ref Range: 96 - 112 mmol/L 105   Co2 Latest Ref Range: 20 - 33 mmol/L 24   Anion Gap Latest Ref Range: 0.0 - 11.9  7.0   Glucose Latest Ref Range: 65 - 99 mg/dL 123 (H)   Bun Latest Ref Range: 8 - 22 mg/dL 15   Creatinine Latest Ref Range: 0.50 - 1.40 mg/dL 0.98   GFR If  Latest Ref Range: >60 mL/min/1.73 m 2 >60   GFR If Non  Latest Ref Range: >60 mL/min/1.73 m 2 >60   Calcium " Latest Ref Range: 8.5 - 10.5 mg/dL 8.2 (L)   Results for SALLIE GREER (MRN [id number]) as of 2/20/2018 14:00   Ref. Range 2/2/2018 23:09   Cholesterol,Tot Latest Ref Range: 100 - 199 mg/dL 157   Triglycerides Latest Ref Range: 0 - 149 mg/dL 99   HDL Latest Ref Range: >=40 mg/dL 36 (A)   LDL Latest Ref Range: <100 mg/dL 101 (H)     February 2, 2018: Transthoracic Echo Report  1. Severely reduced left ventricular systolic function. Estimated LVEF   20%  2. Global hypokinesis with regional variation. Apical akinesis. The   inferior wall motion is mostly preserved.  3. The right ventricle was normal in size and function.  4. No significant valvular disease  5. Normal estimated right atrial pressure    Assessment:     1. Coronary artery disease involving native coronary artery of native heart without angina pectoris     2. Hx of CABG     3. Ischemic cardiomyopathy     4. Mixed hyperlipidemia  LIPID PROFILE    COMP METABOLIC PANEL       Medical Decision Making:  Today's Assessment / Status / Plan:     Coronary artery disease: Status post 3 vessel bypass. Patient is doing very well postoperatively. His blood pressure is low but he denies any dizziness. I would like him to take enalapril in the evening.    Since he lives in [facility] there is no cardiac rehabilitation available. I would like him to begin walking and work up to 30 minutes 5 days a week. He is agreeable.    Ischemic cardiomyopathy: Low ejection fraction but probably improved with revascularization per the surgeon's report. Patient was discharged without any diuretics. I have cautioned him to avoid excessive salt or fluid intake. I expect that his cardiomyopathy will improve. We will do echocardiogram in 3-6 months.    Hyperlipidemia: Patient's initial lipids are not very elevated. He will continue on the statin and we'll check lipid metabolic panel in 3 months.    Patient will follow-up with myself in approximately one month and with Dr. Fish  in 3 months with lab work prior. He will follow-up sooner if problems.    Collaborating Provider: Dr. Bereket Benoit.

## 2018-02-21 ENCOUNTER — TELEPHONE (OUTPATIENT)
Dept: CARDIOLOGY | Facility: MEDICAL CENTER | Age: 58
End: 2018-02-21

## 2018-02-21 NOTE — TELEPHONE ENCOUNTER
Patient states Jennifer ordered oxycodone and the ICD code is missing from the prescription.  No indication in chart that oxycodone was ordered by this office.  Patient insists that it was and is now here as a walk in with the prescription.  Prescription form is from rehab where he was discharged from.  Patient informed that Jennifer did not give him the prescription and that he needs to contact the rehab the prescription is from.

## 2018-02-21 NOTE — TELEPHONE ENCOUNTER
----- Message from Kalpana Hunt sent at 2/21/2018 10:30 AM PST -----  Regarding: patient having problems filling Oxycodone prescription  MARIA TERESA/Lela      Patient says he is having problems getting his prescription for Oxycodone filled. Pharmacy is requesting additional information. Patient can be reached at [phone].

## 2018-03-19 ENCOUNTER — TELEPHONE (OUTPATIENT)
Dept: CARDIOLOGY | Facility: MEDICAL CENTER | Age: 58
End: 2018-03-19

## 2018-03-19 NOTE — TELEPHONE ENCOUNTER
Called patient regarding standing lab order, patient states he will have labs done @[facility] before appt.

## 2018-03-21 DIAGNOSIS — E78.2 MIXED HYPERLIPIDEMIA: ICD-10-CM

## 2018-04-02 ENCOUNTER — OFFICE VISIT (OUTPATIENT)
Dept: CARDIOLOGY | Facility: MEDICAL CENTER | Age: 58
End: 2018-04-02
Payer: MEDICARE

## 2018-04-02 VITALS
OXYGEN SATURATION: 98 % | HEIGHT: 74 IN | BODY MASS INDEX: 26.44 KG/M2 | WEIGHT: 206 LBS | SYSTOLIC BLOOD PRESSURE: 120 MMHG | DIASTOLIC BLOOD PRESSURE: 80 MMHG | HEART RATE: 82 BPM

## 2018-04-02 DIAGNOSIS — I25.5 ISCHEMIC CARDIOMYOPATHY: ICD-10-CM

## 2018-04-02 DIAGNOSIS — Z95.1 HX OF CABG: ICD-10-CM

## 2018-04-02 DIAGNOSIS — R07.89 OTHER CHEST PAIN: Primary | ICD-10-CM

## 2018-04-02 DIAGNOSIS — I25.10 CORONARY ARTERY DISEASE INVOLVING NATIVE CORONARY ARTERY OF NATIVE HEART WITHOUT ANGINA PECTORIS: ICD-10-CM

## 2018-04-02 PROCEDURE — 99214 OFFICE O/P EST MOD 30 MIN: CPT | Performed by: NURSE PRACTITIONER

## 2018-04-02 RX ORDER — ENALAPRIL MALEATE 2.5 MG/1
2.5 TABLET ORAL DAILY
Qty: 90 TAB | Refills: 3 | Status: SHIPPED | OUTPATIENT
Start: 2018-04-02 | End: 2020-12-30 | Stop reason: SDUPTHER

## 2018-04-02 RX ORDER — CLOPIDOGREL BISULFATE 75 MG/1
75 TABLET ORAL DAILY
Qty: 30 TAB | Refills: 11 | Status: SHIPPED | OUTPATIENT
Start: 2018-04-02 | End: 2020-12-30

## 2018-04-02 RX ORDER — MULTIVIT-MIN/IRON/FOLIC ACID/K 18-600-40
CAPSULE ORAL
COMMUNITY
End: 2021-08-06

## 2018-04-02 RX ORDER — ATORVASTATIN CALCIUM 80 MG/1
80 TABLET, FILM COATED ORAL EVERY EVENING
Qty: 90 TAB | Refills: 3 | Status: SHIPPED | OUTPATIENT
Start: 2018-04-02 | End: 2021-02-04

## 2018-04-02 RX ORDER — CARVEDILOL 3.12 MG/1
3.12 TABLET ORAL 2 TIMES DAILY
Qty: 180 TAB | Refills: 3 | Status: SHIPPED | OUTPATIENT
Start: 2018-04-02 | End: 2020-12-30 | Stop reason: SDUPTHER

## 2018-04-02 ASSESSMENT — ENCOUNTER SYMPTOMS
CLAUDICATION: 0
MYALGIAS: 0
ORTHOPNEA: 0
DIZZINESS: 0
ABDOMINAL PAIN: 0
COUGH: 0
PND: 0
PALPITATIONS: 0
SHORTNESS OF BREATH: 0
WEAKNESS: 0

## 2018-04-02 NOTE — PROGRESS NOTES
Chief Complaint   Patient presents with   • Coronary Artery Disease     follow up        Subjective:   Anthony Reyes is a 57 y.o. male who presents today to follow-up on coronary artery disease status post bypass done on February 2nd, 2018.    He continues to complain of a fair amount of incisional pain. He states he is sore at the bottom end of his incision and on the right side of his mid sternum. He denies any movement or popping sounds.    He has increased his activity and finds that doing things will cause his incision to hurt. He is continuing to take oxycodone. He is walking a couple times a week for 2-4 miles. He is walking moderately but not too briskly.    He denies any anginal symptoms or shortness of breath.    Past Medical History:   Diagnosis Date   • CAD (coronary artery disease) 02/03/2017    CABGx3     Past Surgical History:   Procedure Laterality Date   • MULTIPLE CORONARY ARTERY BYPASS ENDO VEIN HARVEST  2/3/2018    Procedure: MULTIPLE CORONARY ARTERY BYPASS X3, ENDO VEIN HARVEST RIGHT LOWER LEG;  Surgeon: Fredrick Burger M.D.;  Location: SURGERY [facility];  Service: Cardiothoracic   • PB  2/3/2018    Procedure: PB;  Surgeon: Fredrick Burger M.D.;  Location: SURGERY [facility];  Service: Cardiothoracic   • ANKLE ORIF Right      Family History   Problem Relation Age of Onset   • Heart Disease Maternal Uncle 54     CABG     Social History     Social History   • Marital status:      Spouse name: N/A   • Number of children: N/A   • Years of education: N/A     Occupational History   • Not on file.     Social History Main Topics   • Smoking status: Never Smoker   • Smokeless tobacco: Former User     Quit date: 1980      Comment: Chewed for 5 years   • Alcohol use No   • Drug use: No   • Sexual activity: Not on file     Other Topics Concern   • Not on file     Social History Narrative   • No narrative on file     No Known Allergies  Outpatient Encounter Prescriptions as of 4/2/2018  "  Medication Sig Dispense Refill   • Cholecalciferol (VITAMIN D) 2000 units Cap Take  by mouth.     • atorvastatin (LIPITOR) 80 MG tablet Take 1 Tab by mouth every evening. 90 Tab 3   • carvedilol (COREG) 3.125 MG Tab Take 1 Tab by mouth 2 times a day. 180 Tab 3   • clopidogrel (PLAVIX) 75 MG Tab Take 1 Tab by mouth every day. 30 Tab 11   • enalapril (VASOTEC) 2.5 MG Tab Take 1 Tab by mouth every day. 90 Tab 3   • VENTOLIN  (90 Base) MCG/ACT Aero Soln inhalation aerosol Inhale 1-2 Puffs by mouth as needed.     • oxyCODONE immediate release (ROXICODONE) 10 MG immediate release tablet Take 10 mg by mouth every four hours as needed for Moderate Pain.     • senna-docusate (PERICOLACE OR SENOKOT S) 8.6-50 MG Tab Take 1 Tab by mouth every day.     • aspirin EC 81 MG EC tablet Take 1 Tab by mouth every day. 30 Tab    • Spacer/Aero-Holding Chambers (OPTICHAMBER EZ) Misc USE AS DIRECTED  0   • [DISCONTINUED] atorvastatin (LIPITOR) 80 MG tablet Take 1 Tab by mouth every bedtime. 30 Tab    • [DISCONTINUED] carvedilol (COREG) 3.125 MG Tab Take 1 Tab by mouth 2 times a day. 60 Tab 3   • [DISCONTINUED] clopidogrel (PLAVIX) 75 MG Tab Take 1 Tab by mouth every day. 30 Tab 3   • [DISCONTINUED] enalapril (VASOTEC) 2.5 MG Tab Take 1 Tab by mouth every day. 30 Tab    • [DISCONTINUED] omeprazole (PRILOSEC) 40 MG delayed-release capsule Take 40 mg by mouth Once.       No facility-administered encounter medications on file as of 4/2/2018.      Review of Systems   Constitutional: Negative for malaise/fatigue.   Respiratory: Negative for cough and shortness of breath.    Cardiovascular: Positive for chest pain (incisional). Negative for palpitations, orthopnea, claudication, leg swelling and PND.   Gastrointestinal: Negative for abdominal pain.   Musculoskeletal: Negative for myalgias.   Neurological: Negative for dizziness and weakness.        Objective:   /80   Pulse 82   Ht 1.88 m (6' 2\")   Wt 93.4 kg (206 lb)   " SpO2 98%   BMI 26.45 kg/m²     Physical Exam   Constitutional: He is oriented to person, place, and time. He appears well-developed and well-nourished.   HENT:   Head: Normocephalic.   Eyes: EOM are normal.   Neck: Normal range of motion. No JVD present.   Cardiovascular: Normal rate and regular rhythm.  Exam reveals no friction rub.    No murmur heard.  Pulmonary/Chest: Effort normal. He exhibits tenderness (tender over the right costochondral cartilages. Palpation reproduces his pain.).   Chest incision is well healed. Sternum is stable.   Abdominal: Soft. Bowel sounds are normal.   Musculoskeletal: He exhibits no edema.   Neurological: He is alert and oriented to person, place, and time.   Skin: Skin is warm and dry.   Psychiatric: He has a normal mood and affect.     March 21, 2018: Her hands of metabolic panels within normal limits with the exception fasting glucose 111. Lipids: Cholesterol 109, triglycerides 90, HDL 27, LDL 64.    Assessment:     1. Other chest pain     2. Coronary artery disease involving native coronary artery of native heart without angina pectoris     3. Hx of CABG     4. Ischemic cardiomyopathy  Echocardiogram Comp w/o Cont       Medical Decision Making:  Today's Assessment / Status / Plan:     Chest pain: Incisional and musculoskeletal. At this point he should be backing off on the oxycodone. If he feels that he needs more this he will contact the surgeons office.    I have recommended Tylenol 500 mg every 6 hours and additional NSAID. I would like him to take either Aleve 220 mg with breakfast and dinner or ibuprofen 200 mg 1-4 tablets as needed for pain. He takes an NSAID he will take it with solid food every time.    Coronary artery bypass: He is wondering if he can get off some of his medications. Since he has premature coronary artery disease it is unlikely that he will be off medications. I have let him know that aspirin and statin will be ongoing.    Ischemic cardiomyopathy: He  is on carvedilol and enalapril in hopes of strengthening his heart muscle. We'll do echocardiogram of weak before he has a follow-up appointment to determine his ejection fraction.    He will follow up as scheduled with Dr. Fish on May 31, 2018. He will follow-up sooner if problems.    Collaborating Provider: Dr. Donnelly.

## 2018-04-02 NOTE — LETTER
[facility]   [address] [place] [address] [address]  [address] [address]  Phone: [phone]  Fax: [phone]              Anthony Reyes  1960    Encounter Date: 4/2/2018    ISAURO Marr          PROGRESS NOTE:  Chief Complaint   Patient presents with   • Coronary Artery Disease     follow up        Subjective:   Anthony Reyes is a 57 y.o. male who presents today to follow-up on coronary artery disease status post bypass done on February 2nd, 2018.    He continues to complain of a fair amount of incisional pain. He states he is sore at the bottom end of his incision and on the right side of his mid sternum. He denies any movement or popping sounds.    He has increased his activity and finds that doing things will cause his incision to hurt. He is continuing to take oxycodone. He is walking a couple times a week for 2-4 miles. He is walking moderately but not too briskly.    He denies any anginal symptoms or shortness of breath.    Past Medical History:   Diagnosis Date   • CAD (coronary artery disease) 02/03/2017    CABGx3     Past Surgical History:   Procedure Laterality Date   • MULTIPLE CORONARY ARTERY BYPASS ENDO VEIN HARVEST  2/3/2018    Procedure: MULTIPLE CORONARY ARTERY BYPASS X3, ENDO VEIN HARVEST RIGHT LOWER LEG;  Surgeon: Fredrick Burger M.D.;  Location: SURGERY [facility];  Service: Cardiothoracic   • PB  2/3/2018    Procedure: PB;  Surgeon: Fredrick Burger M.D.;  Location: SURGERY [facility];  Service: Cardiothoracic   • ANKLE ORIF Right      Family History   Problem Relation Age of Onset   • Heart Disease Maternal Uncle 54     CABG     Social History     Social History   • Marital status:      Spouse name: N/A   • Number of children: N/A   • Years of education: N/A     Occupational History   • Not on file.     Social History Main Topics   • Smoking status: Never Smoker   • Smokeless tobacco: Former User     Quit date: 1980    Comment: Chewed for 5 years   • Alcohol use No   • Drug use: No   • Sexual activity: Not on file     Other Topics Concern   • Not on file     Social History Narrative   • No narrative on file     No Known Allergies  Outpatient Encounter Prescriptions as of 4/2/2018   Medication Sig Dispense Refill   • Cholecalciferol (VITAMIN D) 2000 units Cap Take  by mouth.     • atorvastatin (LIPITOR) 80 MG tablet Take 1 Tab by mouth every evening. 90 Tab 3   • carvedilol (COREG) 3.125 MG Tab Take 1 Tab by mouth 2 times a day. 180 Tab 3   • clopidogrel (PLAVIX) 75 MG Tab Take 1 Tab by mouth every day. 30 Tab 11   • enalapril (VASOTEC) 2.5 MG Tab Take 1 Tab by mouth every day. 90 Tab 3   • VENTOLIN  (90 Base) MCG/ACT Aero Soln inhalation aerosol Inhale 1-2 Puffs by mouth as needed.     • oxyCODONE immediate release (ROXICODONE) 10 MG immediate release tablet Take 10 mg by mouth every four hours as needed for Moderate Pain.     • senna-docusate (PERICOLACE OR SENOKOT S) 8.6-50 MG Tab Take 1 Tab by mouth every day.     • aspirin EC 81 MG EC tablet Take 1 Tab by mouth every day. 30 Tab    • Spacer/Aero-Holding Chambers (OPTICHAMBER EZ) Misc USE AS DIRECTED  0   • [DISCONTINUED] atorvastatin (LIPITOR) 80 MG tablet Take 1 Tab by mouth every bedtime. 30 Tab    • [DISCONTINUED] carvedilol (COREG) 3.125 MG Tab Take 1 Tab by mouth 2 times a day. 60 Tab 3   • [DISCONTINUED] clopidogrel (PLAVIX) 75 MG Tab Take 1 Tab by mouth every day. 30 Tab 3   • [DISCONTINUED] enalapril (VASOTEC) 2.5 MG Tab Take 1 Tab by mouth every day. 30 Tab    • [DISCONTINUED] omeprazole (PRILOSEC) 40 MG delayed-release capsule Take 40 mg by mouth Once.       No facility-administered encounter medications on file as of 4/2/2018.      Review of Systems   Constitutional: Negative for malaise/fatigue.   Respiratory: Negative for cough and shortness of breath.    Cardiovascular: Positive for chest pain (incisional). Negative for palpitations, orthopnea,  "claudication, leg swelling and PND.   Gastrointestinal: Negative for abdominal pain.   Musculoskeletal: Negative for myalgias.   Neurological: Negative for dizziness and weakness.        Objective:   /80   Pulse 82   Ht 1.88 m (6' 2\")   Wt 93.4 kg (206 lb)   SpO2 98%   BMI 26.45 kg/m²      Physical Exam   Constitutional: He is oriented to person, place, and time. He appears well-developed and well-nourished.   HENT:   Head: Normocephalic.   Eyes: EOM are normal.   Neck: Normal range of motion. No JVD present.   Cardiovascular: Normal rate and regular rhythm.  Exam reveals no friction rub.    No murmur heard.  Pulmonary/Chest: Effort normal. He exhibits tenderness (tender over the right costochondral cartilages. Palpation reproduces his pain.).   Chest incision is well healed. Sternum is stable.   Abdominal: Soft. Bowel sounds are normal.   Musculoskeletal: He exhibits no edema.   Neurological: He is alert and oriented to person, place, and time.   Skin: Skin is warm and dry.   Psychiatric: He has a normal mood and affect.     March 21, 2018: Her hands of metabolic panels within normal limits with the exception fasting glucose 111. Lipids: Cholesterol 109, triglycerides 90, HDL 27, LDL 64.    Assessment:     1. Other chest pain     2. Coronary artery disease involving native coronary artery of native heart without angina pectoris     3. Hx of CABG     4. Ischemic cardiomyopathy  Echocardiogram Comp w/o Cont       Medical Decision Making:  Today's Assessment / Status / Plan:     Chest pain: Incisional and musculoskeletal. At this point he should be backing off on the oxycodone. If he feels that he needs more this he will contact the surgeons office.    I have recommended Tylenol 500 mg every 6 hours and additional NSAID. I would like him to take either Aleve 220 mg with breakfast and dinner or ibuprofen 200 mg 1-4 tablets as needed for pain. He takes an NSAID he will take it with solid food every " time.    Coronary artery bypass: He is wondering if he can get off some of his medications. Since he has premature coronary artery disease it is unlikely that he will be off medications. I have let him know that aspirin and statin will be ongoing.    Ischemic cardiomyopathy: He is on carvedilol and enalapril in hopes of strengthening his heart muscle. We'll do echocardiogram of weak before he has a follow-up appointment to determine his ejection fraction.    He will follow up as scheduled with Dr. Fish on May 31, 2018. He will follow-up sooner if problems.    Collaborating Provider: Dr. Donnelly.        No Recipients

## 2018-05-31 ENCOUNTER — OFFICE VISIT (OUTPATIENT)
Dept: CARDIOLOGY | Facility: MEDICAL CENTER | Age: 58
End: 2018-05-31
Payer: MEDICARE

## 2018-05-31 VITALS
SYSTOLIC BLOOD PRESSURE: 104 MMHG | DIASTOLIC BLOOD PRESSURE: 70 MMHG | RESPIRATION RATE: 14 BRPM | HEIGHT: 74 IN | BODY MASS INDEX: 26.69 KG/M2 | OXYGEN SATURATION: 97 % | HEART RATE: 70 BPM | WEIGHT: 208 LBS

## 2018-05-31 DIAGNOSIS — Z95.1 HX OF CABG: ICD-10-CM

## 2018-05-31 DIAGNOSIS — I25.10 CORONARY ARTERY DISEASE INVOLVING NATIVE CORONARY ARTERY OF NATIVE HEART WITHOUT ANGINA PECTORIS: ICD-10-CM

## 2018-05-31 DIAGNOSIS — E78.2 MIXED HYPERLIPIDEMIA: ICD-10-CM

## 2018-05-31 DIAGNOSIS — I25.5 ISCHEMIC CARDIOMYOPATHY: ICD-10-CM

## 2018-05-31 PROCEDURE — 99214 OFFICE O/P EST MOD 30 MIN: CPT | Performed by: INTERNAL MEDICINE

## 2018-05-31 RX ORDER — OMEPRAZOLE 40 MG/1
40 CAPSULE, DELAYED RELEASE ORAL EVERY EVENING
COMMUNITY
End: 2023-11-16 | Stop reason: SDUPTHER

## 2018-05-31 NOTE — PROGRESS NOTES
Chief Complaint   Patient presents with   • Chest Pain       Subjective:   Anthony Reyes is a 58 y.o. male who presents today in follow-up for CABG that was done February 2, 2018.  The patient had reduced ejection fraction and has been on medication was noted below since then.  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  He has been installing a 2000 ft.² deck using a drill and screws which resulted in significant soreness and pain along his sternum.  He has also played a few rounds of golf which initially caused him quite a bit of pain in the chest but now only intermittent.  He does not have angina pains and is not short of breath with exertion.  He denies orthopnea, PND, pedal edema.  He is concerned about the way that his lower sternum looks and he is worried about his xiphoid process of which he has never been aware of in the past.  He was scheduled for an echocardiogram but got confused and missed that appointment.  We will try to reschedule that.    Past Medical History:   Diagnosis Date   • CAD (coronary artery disease) 02/03/2017    CABGx3     Past Surgical History:   Procedure Laterality Date   • MULTIPLE CORONARY ARTERY BYPASS ENDO VEIN HARVEST  2/3/2018    Procedure: MULTIPLE CORONARY ARTERY BYPASS X3, ENDO VEIN HARVEST RIGHT LOWER LEG;  Surgeon: Fredrick Burger M.D.;  Location: SURGERY [facility];  Service: Cardiothoracic   • PB  2/3/2018    Procedure: PB;  Surgeon: Fredrick Burger M.D.;  Location: SURGERY [facility];  Service: Cardiothoracic   • ANKLE ORIF Right      Family History   Problem Relation Age of Onset   • Heart Disease Maternal Uncle 54     CABG     Social History     Social History   • Marital status:      Spouse name: N/A   • Number of children: N/A   • Years of education: N/A     Occupational History   • Not on file.     Social History Main Topics   • Smoking status:  "Never Smoker   • Smokeless tobacco: Former User     Quit date: 1980      Comment: Chewed for 5 years   • Alcohol use No   • Drug use: No   • Sexual activity: Not on file     Other Topics Concern   • Not on file     Social History Narrative   • No narrative on file     No Known Allergies  Outpatient Encounter Prescriptions as of 5/31/2018   Medication Sig Dispense Refill   • omeprazole (PRILOSEC) 40 MG delayed-release capsule Take 40 mg by mouth every evening.     • Cholecalciferol (VITAMIN D) 2000 units Cap Take  by mouth.     • atorvastatin (LIPITOR) 80 MG tablet Take 1 Tab by mouth every evening. 90 Tab 3   • carvedilol (COREG) 3.125 MG Tab Take 1 Tab by mouth 2 times a day. 180 Tab 3   • clopidogrel (PLAVIX) 75 MG Tab Take 1 Tab by mouth every day. 30 Tab 11   • enalapril (VASOTEC) 2.5 MG Tab Take 1 Tab by mouth every day. 90 Tab 3   • Spacer/Aero-Holding Chambers (OPTICHAMBER EZ) Misc USE AS DIRECTED  0   • oxyCODONE immediate release (ROXICODONE) 10 MG immediate release tablet Take 10 mg by mouth as needed for Moderate Pain.     • aspirin EC 81 MG EC tablet Take 1 Tab by mouth every day. 30 Tab    • VENTOLIN  (90 Base) MCG/ACT Aero Soln inhalation aerosol Inhale 1-2 Puffs by mouth as needed.     • senna-docusate (PERICOLACE OR SENOKOT S) 8.6-50 MG Tab Take 1 Tab by mouth every day.       No facility-administered encounter medications on file as of 5/31/2018.      ROS.  See HPI     Objective:   /70   Pulse 70   Resp 14   Ht 1.88 m (6' 2\")   Wt 94.3 kg (208 lb)   SpO2 97%   BMI 26.71 kg/m²     Physical Exam General: WD, WN, male in NAD. Weight is up 2 pounds  Neck: No HJR, JVD. JVP 4-5 cm H2O. Good carotid upstroke and no bruit  Chest: Clear to A & P.  His sternum is solid and there is no acute abnormality.  His xiphoid process is slightly tender to palpation but not particularly protuberant.  Heart: Regular rhythm,  S1 = S2. No murmur, gallop, rub, click  Ext: No edema  Neuro: Alert, " oriented  Psych: normal mood, affect    Assessment:     1. Hx of CABG     2. Coronary artery disease involving native coronary artery of native heart without angina pectoris     3. Ischemic cardiomyopathy     4. Mixed hyperlipidemia         Medical Decision Making:  Today's Assessment / Status / Plan:   We will reschedule her echocardiogram and is patient to have this done in the next week or 2.  It is important to know what his ejection fraction is at this point because his blood pressure is fairly low and he is taking vasodilator beta-blocker as well as an ACE inhibitor.  He has no symptoms of congestive heart failure and S1 is equal to S2 suggesting that his ejection fraction is probably higher than 20% noted at the time of surgery.  He will continue taking all his usual medications in the meantime.  He will return in 2 months at which time we will reassess his medical regimen.  Lipids will be ordered prior to return.  He is encouraged to continue doing activity but to expect sternal soreness whenever he does a new activity.  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  I asked him to have patience with his recovery

## 2018-07-27 ENCOUNTER — OFFICE VISIT (OUTPATIENT)
Dept: CARDIOLOGY | Facility: MEDICAL CENTER | Age: 58
End: 2018-07-27
Payer: MEDICARE

## 2018-07-27 ENCOUNTER — HOSPITAL ENCOUNTER (OUTPATIENT)
Dept: CARDIOLOGY | Facility: MEDICAL CENTER | Age: 58
End: 2018-07-27
Attending: NURSE PRACTITIONER
Payer: MEDICARE

## 2018-07-27 VITALS
SYSTOLIC BLOOD PRESSURE: 110 MMHG | OXYGEN SATURATION: 96 % | DIASTOLIC BLOOD PRESSURE: 70 MMHG | RESPIRATION RATE: 14 BRPM | HEIGHT: 74 IN | BODY MASS INDEX: 27.46 KG/M2 | WEIGHT: 214 LBS | HEART RATE: 76 BPM

## 2018-07-27 DIAGNOSIS — Z95.1 HX OF CABG: ICD-10-CM

## 2018-07-27 DIAGNOSIS — I25.5 ISCHEMIC CARDIOMYOPATHY: ICD-10-CM

## 2018-07-27 DIAGNOSIS — I25.10 CORONARY ARTERY DISEASE INVOLVING NATIVE CORONARY ARTERY OF NATIVE HEART WITHOUT ANGINA PECTORIS: ICD-10-CM

## 2018-07-27 DIAGNOSIS — E78.2 MIXED HYPERLIPIDEMIA: ICD-10-CM

## 2018-07-27 PROCEDURE — 99213 OFFICE O/P EST LOW 20 MIN: CPT | Performed by: INTERNAL MEDICINE

## 2018-07-27 PROCEDURE — 93306 TTE W/DOPPLER COMPLETE: CPT

## 2018-07-27 PROCEDURE — 93306 TTE W/DOPPLER COMPLETE: CPT | Mod: 26 | Performed by: INTERNAL MEDICINE

## 2018-07-27 NOTE — PROGRESS NOTES
Chief Complaint   Patient presents with   • Follow-Up     HX of CABG.       Subjective:   Anthony Reyes is a 58 y.o. male who presents today in follow-up for bypass surgery done in February 2018 to 3 vessels.  The patient has done well postoperatively except he continues to have some chest discomfort especially at the end of the day.  He does pretty heavy work but likes to take his oxycodone.  It helps him at the end of the day.  His echocardiogram prior to bypass surgery revealed an ejection fraction of 20%.  He has not had a postoperative echo to this point but he does not have any symptoms of congestive heart failure and no angina pectoris.  Echocardiogram was done today and he would like to go over the results.  His most recent lipids reveal a total cholesterol of 109 triglycerides 90 HDL 27 LDL 64.  See HPI    Past Medical History:   Diagnosis Date   • CAD (coronary artery disease) 02/03/2017    CABGx3     Past Surgical History:   Procedure Laterality Date   • MULTIPLE CORONARY ARTERY BYPASS ENDO VEIN HARVEST  2/3/2018    Procedure: MULTIPLE CORONARY ARTERY BYPASS X3, ENDO VEIN HARVEST RIGHT LOWER LEG;  Surgeon: Fredrick Burger M.D.;  Location: SURGERY [facility];  Service: Cardiothoracic   • PB  2/3/2018    Procedure: PB;  Surgeon: Fredrick Burger M.D.;  Location: SURGERY [facility];  Service: Cardiothoracic   • ANKLE ORIF Right      Family History   Problem Relation Age of Onset   • Heart Disease Maternal Uncle 54        CABG     Social History     Social History   • Marital status:      Spouse name: N/A   • Number of children: N/A   • Years of education: N/A     Occupational History   • Not on file.     Social History Main Topics   • Smoking status: Never Smoker   • Smokeless tobacco: Former User     Quit date: 1980      Comment: Chewed for 5 years   • Alcohol use No   • Drug use: No   • Sexual activity: Not on file     Other Topics Concern   • Not on file     Social History  "Narrative   • No narrative on file     No Known Allergies  Outpatient Encounter Prescriptions as of 7/27/2018   Medication Sig Dispense Refill   • omeprazole (PRILOSEC) 40 MG delayed-release capsule Take 40 mg by mouth every evening.     • Cholecalciferol (VITAMIN D) 2000 units Cap Take  by mouth.     • atorvastatin (LIPITOR) 80 MG tablet Take 1 Tab by mouth every evening. 90 Tab 3   • carvedilol (COREG) 3.125 MG Tab Take 1 Tab by mouth 2 times a day. 180 Tab 3   • clopidogrel (PLAVIX) 75 MG Tab Take 1 Tab by mouth every day. 30 Tab 11   • enalapril (VASOTEC) 2.5 MG Tab Take 1 Tab by mouth every day. 90 Tab 3   • Spacer/Aero-Holding Chambers (OPTICHAMBER EZ) Misc USE AS DIRECTED  0   • aspirin EC 81 MG EC tablet Take 1 Tab by mouth every day. 30 Tab    • VENTOLIN  (90 Base) MCG/ACT Aero Soln inhalation aerosol Inhale 1-2 Puffs by mouth as needed.     • oxyCODONE immediate release (ROXICODONE) 10 MG immediate release tablet Take 10 mg by mouth as needed for Moderate Pain.     • senna-docusate (PERICOLACE OR SENOKOT S) 8.6-50 MG Tab Take 1 Tab by mouth every day.       No facility-administered encounter medications on file as of 7/27/2018.      ROS.  See HPI     Objective:   /70   Pulse 76   Resp 14   Ht 1.88 m (6' 2\")   Wt 97.1 kg (214 lb)   SpO2 96%   BMI 27.48 kg/m²     Physical Exam General: WD, WN, male in NAD. Weight is up 18 pounds since surgery.  Neck: No  JVD. Good carotid upstroke and no bruit  Chest: Clear to A & P  Heart: Regular rhythm, Normal S1 and S2. No murmur, gallop, rub, click  Ext: No edema, good pulses  Neuro: Alert, oriented  Psych: normal mood, affect    Echocardiogram reveals normal left ventricular size and systolic function.  The estimated ejection fraction is 60-65%.  There is no dramatic wall motion abnormality and no significant valvular dysfunction.    Assessment:     1. Coronary artery disease involving native coronary artery of native heart without angina " pectoris     2. Hx of CABG     3. Mixed hyperlipidemia     4. Ischemic cardiomyopathy         Medical Decision Making:  Today's Assessment / Status / Plan:   The patient's ejection fraction is back to normal following revascularization.  He has no symptoms of angina or congestive heart failure.  He still has sensitivity of the sternum.  I suggested that he not take oxycodone any longer.  I told him I could not write him a prescription but that he could contact his surgeon and ask for small prescription.  That needs to be his last prescription.  I suggested that symptoms will gradually improve whether he takes that medication or not.  His ACE inhibitor and carvedilol will be continued but may be decreased or stopped at the one-year wagner.  The atorvastatin will be continued at a high dose for another 6 months but dose may be it may be reasonable to decrease the dose at that point.  He will return in 6 months.  Will send any blood work that he has done in [place] to our office.  Overall he seems to be doing extremely well if we can figure out how to deal with the sensation he experiences in his chest.

## 2018-07-28 LAB
LV EJECT FRACT  99904: 65
LV EJECT FRACT MOD 2C 99903: 59.94
LV EJECT FRACT MOD 4C 99902: 65.13
LV EJECT FRACT MOD BP 99901: 64.68

## 2020-12-30 ENCOUNTER — OFFICE VISIT (OUTPATIENT)
Dept: CARDIOLOGY | Facility: MEDICAL CENTER | Age: 60
End: 2020-12-30
Payer: MEDICARE

## 2020-12-30 VITALS
HEART RATE: 84 BPM | BODY MASS INDEX: 26.56 KG/M2 | OXYGEN SATURATION: 96 % | HEIGHT: 74 IN | SYSTOLIC BLOOD PRESSURE: 138 MMHG | WEIGHT: 207 LBS | DIASTOLIC BLOOD PRESSURE: 74 MMHG

## 2020-12-30 DIAGNOSIS — I25.5 ISCHEMIC CARDIOMYOPATHY: ICD-10-CM

## 2020-12-30 DIAGNOSIS — I25.10 CORONARY ARTERY DISEASE INVOLVING NATIVE CORONARY ARTERY OF NATIVE HEART WITHOUT ANGINA PECTORIS: ICD-10-CM

## 2020-12-30 DIAGNOSIS — E78.2 MIXED HYPERLIPIDEMIA: ICD-10-CM

## 2020-12-30 DIAGNOSIS — Z79.899 HIGH RISK MEDICATION USE: ICD-10-CM

## 2020-12-30 DIAGNOSIS — I24.9 ACS (ACUTE CORONARY SYNDROME) (HCC): ICD-10-CM

## 2020-12-30 DIAGNOSIS — Z95.1 HX OF CABG: ICD-10-CM

## 2020-12-30 DIAGNOSIS — R06.02 SOB (SHORTNESS OF BREATH): ICD-10-CM

## 2020-12-30 PROCEDURE — 99215 OFFICE O/P EST HI 40 MIN: CPT | Performed by: INTERNAL MEDICINE

## 2020-12-30 RX ORDER — ENALAPRIL MALEATE 2.5 MG/1
2.5 TABLET ORAL DAILY
Qty: 90 TAB | Refills: 3 | Status: SHIPPED | OUTPATIENT
Start: 2020-12-30 | End: 2021-02-04

## 2020-12-30 RX ORDER — FLUTICASONE PROPIONATE 50 MCG
1 SPRAY, SUSPENSION (ML) NASAL
COMMUNITY
Start: 2020-11-19 | End: 2021-08-06

## 2020-12-30 RX ORDER — CARVEDILOL 3.12 MG/1
3.12 TABLET ORAL 2 TIMES DAILY
Qty: 180 TAB | Refills: 3 | Status: SHIPPED | OUTPATIENT
Start: 2020-12-30 | End: 2021-02-04

## 2020-12-30 ASSESSMENT — ENCOUNTER SYMPTOMS
HEMOPTYSIS: 0
MUSCULOSKELETAL NEGATIVE: 1
CARDIOVASCULAR NEGATIVE: 1
STRIDOR: 0
CLAUDICATION: 0
WHEEZING: 0
CHILLS: 0
LOSS OF CONSCIOUSNESS: 0
PND: 0
SPUTUM PRODUCTION: 0
BRUISES/BLEEDS EASILY: 0
COUGH: 0
SORE THROAT: 0
NEUROLOGICAL NEGATIVE: 1
ORTHOPNEA: 0
WEAKNESS: 0
EYES NEGATIVE: 1
DIZZINESS: 0
PALPITATIONS: 0
RESPIRATORY NEGATIVE: 1
SHORTNESS OF BREATH: 0
FEVER: 0
GASTROINTESTINAL NEGATIVE: 1
CONSTITUTIONAL NEGATIVE: 1

## 2020-12-30 NOTE — PROGRESS NOTES
Chief Complaint   Patient presents with   • Coronary Artery Disease     Follow Up       Subjective:   Anthony Reyes is a 60 y.o. male who presents today as a follow-up from his CAD status post CABG hypertension hyperlipidemia.  He had no risk factors prior to his bypass surgery.  He was lost to follow-up for 2 years.  Lately when he exerts himself he gets a shortness of breath followed by chest pain.  He does not check his blood pressures at home.  He recently had blood work done which was done in [place].  His primary care physician said that he needs to work on his cholesterol is starting to trend higher.  He is having no rest angina.  He has been compliant with his medications.  He did say he was off them for short period of time and felt better.    Past Medical History:   Diagnosis Date   • CAD (coronary artery disease) 02/03/2017    CABGx3     Past Surgical History:   Procedure Laterality Date   • MULTIPLE CORONARY ARTERY BYPASS ENDO VEIN HARVEST  2/3/2018    Procedure: MULTIPLE CORONARY ARTERY BYPASS X3, ENDO VEIN HARVEST RIGHT LOWER LEG;  Surgeon: Fredrick Burger M.D.;  Location: SURGERY [facility];  Service: Cardiothoracic   • PB  2/3/2018    Procedure: PB;  Surgeon: Fredrick Burger M.D.;  Location: SURGERY [facility];  Service: Cardiothoracic   • ANKLE ORIF Right      Family History   Problem Relation Age of Onset   • Heart Disease Maternal Uncle 54        CABG     Social History     Socioeconomic History   • Marital status:      Spouse name: Not on file   • Number of children: Not on file   • Years of education: Not on file   • Highest education level: Not on file   Occupational History   • Not on file   Social Needs   • Financial resource strain: Not on file   • Food insecurity     Worry: Not on file     Inability: Not on file   • Transportation needs     Medical: Not on file     Non-medical: Not on file   Tobacco Use   • Smoking status: Never Smoker   • Smokeless tobacco:  Former User   • Tobacco comment: Chewed for 5 years   Substance and Sexual Activity   • Alcohol use: No   • Drug use: No   • Sexual activity: Not on file   Lifestyle   • Physical activity     Days per week: Not on file     Minutes per session: Not on file   • Stress: Not on file   Relationships   • Social connections     Talks on phone: Not on file     Gets together: Not on file     Attends Denominational service: Not on file     Active member of club or organization: Not on file     Attends meetings of clubs or organizations: Not on file     Relationship status: Not on file   • Intimate partner violence     Fear of current or ex partner: Not on file     Emotionally abused: Not on file     Physically abused: Not on file     Forced sexual activity: Not on file   Other Topics Concern   • Not on file   Social History Narrative   • Not on file     No Known Allergies  Outpatient Encounter Medications as of 12/30/2020   Medication Sig Dispense Refill   • fluticasone (FLONASE) 50 MCG/ACT nasal spray Administer 1 Spray into affected nostril(S).     • carvedilol (COREG) 3.125 MG Tab Take 1 Tab by mouth 2 times a day. 180 Tab 3   • enalapril (VASOTEC) 2.5 MG Tab Take 1 Tab by mouth every day. 90 Tab 3   • omeprazole (PRILOSEC) 40 MG delayed-release capsule Take 40 mg by mouth every evening.     • Cholecalciferol (VITAMIN D) 2000 units Cap Take  by mouth.     • atorvastatin (LIPITOR) 80 MG tablet Take 1 Tab by mouth every evening. 90 Tab 3   • VENTOLIN  (90 Base) MCG/ACT Aero Soln inhalation aerosol Inhale 1-2 Puffs by mouth as needed.     • Spacer/Aero-Holding Chambers (OPTICHAMBER EZ) [facility] USE AS DIRECTED  0   • oxyCODONE immediate release (ROXICODONE) 10 MG immediate release tablet Take 10 mg by mouth as needed for Moderate Pain.     • aspirin EC 81 MG EC tablet Take 1 Tab by mouth every day. 30 Tab    • [DISCONTINUED] carvedilol (COREG) 3.125 MG Tab Take 1 Tab by mouth 2 times a day. 180 Tab 3   • [DISCONTINUED]  "clopidogrel (PLAVIX) 75 MG Tab Take 1 Tab by mouth every day. 30 Tab 11   • [DISCONTINUED] enalapril (VASOTEC) 2.5 MG Tab Take 1 Tab by mouth every day. 90 Tab 3   • senna-docusate (PERICOLACE OR SENOKOT S) 8.6-50 MG Tab Take 1 Tab by mouth every day.       No facility-administered encounter medications on file as of 12/30/2020.      Review of Systems   Constitutional: Negative.  Negative for chills, fever and malaise/fatigue.   HENT: Negative.  Negative for sore throat.    Eyes: Negative.    Respiratory: Negative.  Negative for cough, hemoptysis, sputum production, shortness of breath, wheezing and stridor.    Cardiovascular: Negative.  Negative for chest pain, palpitations, orthopnea, claudication, leg swelling and PND.   Gastrointestinal: Negative.    Genitourinary: Negative.    Musculoskeletal: Negative.    Skin: Negative.    Neurological: Negative.  Negative for dizziness, loss of consciousness and weakness.   Endo/Heme/Allergies: Negative.  Does not bruise/bleed easily.   All other systems reviewed and are negative.       Objective:   /74 (BP Location: Left arm, Patient Position: Sitting, BP Cuff Size: Adult)   Pulse 84   Ht 1.88 m (6' 2\")   Wt 93.9 kg (207 lb)   SpO2 96%   BMI 26.58 kg/m²     Physical Exam   Constitutional: He appears well-developed and well-nourished. No distress.   HENT:   Head: Normocephalic and atraumatic.   Right Ear: External ear normal.   Left Ear: External ear normal.   Nose: Nose normal.   Mouth/Throat: No oropharyngeal exudate.   Eyes: Pupils are equal, round, and reactive to light. Conjunctivae and EOM are normal. Right eye exhibits no discharge. Left eye exhibits no discharge. No scleral icterus.   Neck: Neck supple. No JVD present.   Cardiovascular: Normal rate, regular rhythm and intact distal pulses. Exam reveals no gallop and no friction rub.   No murmur heard.  Pulmonary/Chest: Effort normal. No stridor. No respiratory distress. He has no wheezes. He has no rales. " He exhibits no tenderness.   Abdominal: Soft. He exhibits no distension. There is no guarding.   Musculoskeletal: Normal range of motion.         General: No tenderness, deformity or edema.   Neurological: He is alert. He has normal reflexes. He displays normal reflexes. No cranial nerve deficit. He exhibits normal muscle tone. Coordination normal.   Skin: Skin is warm and dry. No rash noted. He is not diaphoretic. No erythema. No pallor.   Psychiatric: He has a normal mood and affect. His behavior is normal. Judgment and thought content normal.   Nursing note and vitals reviewed.  Echocardiogram: Dated 7/28/2018 personally viewed interpreted myself showing an EF of 65% otherwise normal.    Cardiac catheterization: Dated 2/2/2018 personally viewed interpreted myself showing:  POSTPROCEDURE DIAGNOSES:  1.  Coronary artery disease, 2-vessel with complex proximal left anterior descending  artery serial 95% and 90% stenoses including bifurcation disease involving the diagonal  branch and the distal circumflex artery.  2.  Left ventricular ejection fraction 41% with severe hypokinesis and akinesis of the  anterior wall and anterior apical walls.     Lab Results   Component Value Date/Time    CHOLSTRLTOT 157 02/02/2018 11:09 PM     (H) 02/02/2018 11:09 PM    HDL 36 (A) 02/02/2018 11:09 PM    TRIGLYCERIDE 99 02/02/2018 11:09 PM       Lab Results   Component Value Date/Time    SODIUM 136 02/07/2018 04:40 AM    POTASSIUM 3.7 02/07/2018 04:40 AM    CHLORIDE 105 02/07/2018 04:40 AM    CO2 24 02/07/2018 04:40 AM    GLUCOSE 123 (H) 02/07/2018 04:40 AM    BUN 15 02/07/2018 04:40 AM    CREATININE 0.98 02/07/2018 04:40 AM     Lab Results   Component Value Date/Time    ALKPHOSPHAT 57 02/02/2018 03:15 PM    ASTSGOT 34 02/02/2018 03:15 PM    ALTSGPT 21 02/02/2018 03:15 PM    TBILIRUBIN 0.9 02/02/2018 03:15 PM        Assessment:     1. Mixed hyperlipidemia     2. Ischemic cardiomyopathy  CBC W/ DIFF W/O PLATELETS    Comp  Metabolic Panel    EC-ECHOCARDIOGRAM COMPLETE W/O CONT    LIPOPROTEIN A    enalapril (VASOTEC) 2.5 MG Tab   3. Hx of CABG  CBC W/ DIFF W/O PLATELETS    Comp Metabolic Panel    Lipid Profile    EC-ECHOCARDIOGRAM COMPLETE W/O CONT    LIPOPROTEIN A    carvedilol (COREG) 3.125 MG Tab    enalapril (VASOTEC) 2.5 MG Tab   4. ACS (acute coronary syndrome) (HCC)  CBC W/ DIFF W/O PLATELETS    Comp Metabolic Panel    Lipid Profile    proBrain Natriuretic Peptide, NT    LIPOPROTEIN A    carvedilol (COREG) 3.125 MG Tab    enalapril (VASOTEC) 2.5 MG Tab   5. Coronary artery disease involving native coronary artery of native heart without angina pectoris  CBC W/ DIFF W/O PLATELETS    Comp Metabolic Panel    Lipid Profile    proBrain Natriuretic Peptide, NT    EC-ECHOCARDIOGRAM COMPLETE W/O CONT    LIPOPROTEIN A    enalapril (VASOTEC) 2.5 MG Tab   6. High risk medication use  CBC W/ DIFF W/O PLATELETS    Comp Metabolic Panel    Lipid Profile    proBrain Natriuretic Peptide, NT    EC-ECHOCARDIOGRAM COMPLETE W/O CONT   7. SOB (shortness of breath)  Lipid Profile    proBrain Natriuretic Peptide, NT       Medical Decision Making:  Today's Assessment / Status / Plan:     60-year-old male with a history of CAD status post CABG.  For his high risk medications we will check a number of labs.  I think his shortness of breath could be either fluid retention from a borderline reduced ejection fraction or progression of the heart failure.  I will also recheck an echocardiogram.  I will get his labs from [place].  We will see him back in a few weeks.

## 2021-01-29 ENCOUNTER — HOSPITAL ENCOUNTER (OUTPATIENT)
Dept: CARDIOLOGY | Facility: MEDICAL CENTER | Age: 61
End: 2021-01-29
Attending: INTERNAL MEDICINE
Payer: MEDICARE

## 2021-01-29 ENCOUNTER — HOSPITAL ENCOUNTER (OUTPATIENT)
Dept: LAB | Facility: MEDICAL CENTER | Age: 61
End: 2021-01-29
Attending: INTERNAL MEDICINE
Payer: MEDICARE

## 2021-01-29 DIAGNOSIS — I25.5 ISCHEMIC CARDIOMYOPATHY: ICD-10-CM

## 2021-01-29 DIAGNOSIS — Z95.1 HX OF CABG: ICD-10-CM

## 2021-01-29 DIAGNOSIS — Z79.899 HIGH RISK MEDICATION USE: ICD-10-CM

## 2021-01-29 DIAGNOSIS — I25.10 CORONARY ARTERY DISEASE INVOLVING NATIVE CORONARY ARTERY OF NATIVE HEART WITHOUT ANGINA PECTORIS: ICD-10-CM

## 2021-01-29 DIAGNOSIS — I24.9 ACS (ACUTE CORONARY SYNDROME) (HCC): ICD-10-CM

## 2021-01-29 DIAGNOSIS — R06.02 SOB (SHORTNESS OF BREATH): ICD-10-CM

## 2021-01-29 LAB
ALBUMIN SERPL BCP-MCNC: 4.7 G/DL (ref 3.2–4.9)
ALBUMIN/GLOB SERPL: 2 G/DL
ALP SERPL-CCNC: 79 U/L (ref 30–99)
ALT SERPL-CCNC: 21 U/L (ref 2–50)
ANION GAP SERPL CALC-SCNC: 8 MMOL/L (ref 7–16)
AST SERPL-CCNC: 16 U/L (ref 12–45)
BASOPHILS # BLD AUTO: 0.6 % (ref 0–1.8)
BASOPHILS # BLD: 0.04 K/UL (ref 0–0.12)
BILIRUB SERPL-MCNC: 0.8 MG/DL (ref 0.1–1.5)
BUN SERPL-MCNC: 15 MG/DL (ref 8–22)
CALCIUM SERPL-MCNC: 9.6 MG/DL (ref 8.5–10.5)
CHLORIDE SERPL-SCNC: 103 MMOL/L (ref 96–112)
CHOLEST SERPL-MCNC: 167 MG/DL (ref 100–199)
CO2 SERPL-SCNC: 27 MMOL/L (ref 20–33)
CREAT SERPL-MCNC: 0.81 MG/DL (ref 0.5–1.4)
EOSINOPHIL # BLD AUTO: 0.16 K/UL (ref 0–0.51)
EOSINOPHIL NFR BLD: 2.6 % (ref 0–6.9)
ERYTHROCYTE [DISTWIDTH] IN BLOOD BY AUTOMATED COUNT: 44.7 FL (ref 35.9–50)
GLOBULIN SER CALC-MCNC: 2.3 G/DL (ref 1.9–3.5)
GLUCOSE SERPL-MCNC: 92 MG/DL (ref 65–99)
HCT VFR BLD AUTO: 49.2 % (ref 42–52)
HDLC SERPL-MCNC: 50 MG/DL
HGB BLD-MCNC: 16.5 G/DL (ref 14–18)
IMM GRANULOCYTES # BLD AUTO: 0.02 K/UL (ref 0–0.11)
IMM GRANULOCYTES NFR BLD AUTO: 0.3 % (ref 0–0.9)
LDLC SERPL CALC-MCNC: 102 MG/DL
LYMPHOCYTES # BLD AUTO: 1.3 K/UL (ref 1–4.8)
LYMPHOCYTES NFR BLD: 20.7 % (ref 22–41)
MCH RBC QN AUTO: 33.3 PG (ref 27–33)
MCHC RBC AUTO-ENTMCNC: 33.5 G/DL (ref 33.7–35.3)
MCV RBC AUTO: 99.4 FL (ref 81.4–97.8)
MONOCYTES # BLD AUTO: 0.45 K/UL (ref 0–0.85)
MONOCYTES NFR BLD AUTO: 7.2 % (ref 0–13.4)
NEUTROPHILS # BLD AUTO: 4.3 K/UL (ref 1.82–7.42)
NEUTROPHILS NFR BLD: 68.6 % (ref 44–72)
NRBC # BLD AUTO: 0 K/UL
NRBC BLD-RTO: 0 /100 WBC
NT-PROBNP SERPL IA-MCNC: 19 PG/ML (ref 0–125)
POTASSIUM SERPL-SCNC: 4 MMOL/L (ref 3.6–5.5)
PROT SERPL-MCNC: 7 G/DL (ref 6–8.2)
RBC # BLD AUTO: 4.95 M/UL (ref 4.7–6.1)
SODIUM SERPL-SCNC: 138 MMOL/L (ref 135–145)
TRIGL SERPL-MCNC: 75 MG/DL (ref 0–149)
WBC # BLD AUTO: 6.3 K/UL (ref 4.8–10.8)

## 2021-01-29 PROCEDURE — 80053 COMPREHEN METABOLIC PANEL: CPT

## 2021-01-29 PROCEDURE — 83695 ASSAY OF LIPOPROTEIN(A): CPT

## 2021-01-29 PROCEDURE — 36415 COLL VENOUS BLD VENIPUNCTURE: CPT

## 2021-01-29 PROCEDURE — 93306 TTE W/DOPPLER COMPLETE: CPT

## 2021-01-29 PROCEDURE — 85018 HEMOGLOBIN: CPT

## 2021-01-29 PROCEDURE — 83880 ASSAY OF NATRIURETIC PEPTIDE: CPT

## 2021-01-29 PROCEDURE — 85048 AUTOMATED LEUKOCYTE COUNT: CPT

## 2021-01-29 PROCEDURE — 85014 HEMATOCRIT: CPT

## 2021-01-29 PROCEDURE — 85041 AUTOMATED RBC COUNT: CPT

## 2021-01-29 PROCEDURE — 80061 LIPID PANEL: CPT

## 2021-01-30 LAB
LV EJECT FRACT  99904: 55
LV EJECT FRACT MOD 2C 99903: 59.78
LV EJECT FRACT MOD 4C 99902: 66.24
LV EJECT FRACT MOD BP 99901: 61.67

## 2021-01-30 PROCEDURE — 93306 TTE W/DOPPLER COMPLETE: CPT | Mod: 26 | Performed by: INTERNAL MEDICINE

## 2021-01-31 LAB — LPA SERPL-MCNC: 131 MG/DL

## 2021-02-04 ENCOUNTER — OFFICE VISIT (OUTPATIENT)
Dept: CARDIOLOGY | Facility: MEDICAL CENTER | Age: 61
End: 2021-02-04
Payer: MEDICARE

## 2021-02-04 VITALS
RESPIRATION RATE: 16 BRPM | BODY MASS INDEX: 27.34 KG/M2 | HEART RATE: 62 BPM | HEIGHT: 74 IN | WEIGHT: 213 LBS | DIASTOLIC BLOOD PRESSURE: 84 MMHG | SYSTOLIC BLOOD PRESSURE: 136 MMHG | OXYGEN SATURATION: 97 %

## 2021-02-04 DIAGNOSIS — I25.5 ISCHEMIC CARDIOMYOPATHY: ICD-10-CM

## 2021-02-04 DIAGNOSIS — R06.02 SOB (SHORTNESS OF BREATH): ICD-10-CM

## 2021-02-04 DIAGNOSIS — Z95.1 HX OF CABG: ICD-10-CM

## 2021-02-04 DIAGNOSIS — I24.9 ACS (ACUTE CORONARY SYNDROME) (HCC): ICD-10-CM

## 2021-02-04 DIAGNOSIS — E78.41 ELEVATED LIPOPROTEIN(A): ICD-10-CM

## 2021-02-04 DIAGNOSIS — Z79.899 HIGH RISK MEDICATION USE: ICD-10-CM

## 2021-02-04 DIAGNOSIS — E78.2 MIXED HYPERLIPIDEMIA: ICD-10-CM

## 2021-02-04 DIAGNOSIS — I25.10 CORONARY ARTERY DISEASE INVOLVING NATIVE CORONARY ARTERY OF NATIVE HEART WITHOUT ANGINA PECTORIS: ICD-10-CM

## 2021-02-04 PROCEDURE — 99214 OFFICE O/P EST MOD 30 MIN: CPT | Performed by: INTERNAL MEDICINE

## 2021-02-04 RX ORDER — ENALAPRIL MALEATE 5 MG/1
5 TABLET ORAL DAILY
Qty: 90 TAB | Refills: 11 | Status: SHIPPED | OUTPATIENT
Start: 2021-02-04 | End: 2022-02-18 | Stop reason: SDUPTHER

## 2021-02-04 RX ORDER — EZETIMIBE 10 MG/1
10 TABLET ORAL DAILY
Qty: 90 TAB | Refills: 3 | Status: SHIPPED | OUTPATIENT
Start: 2021-02-04 | End: 2021-08-06 | Stop reason: SDUPTHER

## 2021-02-04 RX ORDER — ATORVASTATIN CALCIUM 80 MG/1
80 TABLET, FILM COATED ORAL EVERY MORNING
Qty: 90 TAB | Refills: 3
Start: 2021-02-04 | End: 2021-08-06 | Stop reason: SDUPTHER

## 2021-02-04 ASSESSMENT — ENCOUNTER SYMPTOMS
PND: 0
NEUROLOGICAL NEGATIVE: 1
DIZZINESS: 0
ORTHOPNEA: 0
MUSCULOSKELETAL NEGATIVE: 1
HEMOPTYSIS: 0
LOSS OF CONSCIOUSNESS: 0
RESPIRATORY NEGATIVE: 1
WEAKNESS: 0
BRUISES/BLEEDS EASILY: 0
SPUTUM PRODUCTION: 0
PALPITATIONS: 0
FEVER: 0
GASTROINTESTINAL NEGATIVE: 1
CARDIOVASCULAR NEGATIVE: 1
SORE THROAT: 0
EYES NEGATIVE: 1
STRIDOR: 0
CHILLS: 0
CLAUDICATION: 0
SHORTNESS OF BREATH: 0
WHEEZING: 0
CONSTITUTIONAL NEGATIVE: 1
COUGH: 0

## 2021-02-04 NOTE — PROGRESS NOTES
Chief Complaint   Patient presents with   • Hyperlipidemia       Subjective:   Anthony Reyes is a 60 y.o. male who presents today as a follow-up from his CAD status post CABG hypertension hyperlipidemia.  He had no risk factors prior to his bypass surgery.    Since he was last seen he continues to do well.  He is having trouble remembering to take his atorvastatin at night.  Last LDL was 102.  Blood pressure is borderline elevated.    Past Medical History:   Diagnosis Date   • CAD (coronary artery disease) 02/03/2017    CABGx3     Past Surgical History:   Procedure Laterality Date   • MULTIPLE CORONARY ARTERY BYPASS ENDO VEIN HARVEST  2/3/2018    Procedure: MULTIPLE CORONARY ARTERY BYPASS X3, ENDO VEIN HARVEST RIGHT LOWER LEG;  Surgeon: Fredrick Burger M.D.;  Location: SURGERY [facility];  Service: Cardiothoracic   • PB  2/3/2018    Procedure: PB;  Surgeon: Fredrick Burger M.D.;  Location: SURGERY [facility];  Service: Cardiothoracic   • ANKLE ORIF Right      Family History   Problem Relation Age of Onset   • Heart Disease Maternal Uncle 54        CABG     Social History     Socioeconomic History   • Marital status:      Spouse name: Not on file   • Number of children: Not on file   • Years of education: Not on file   • Highest education level: Not on file   Occupational History   • Not on file   Social Needs   • Financial resource strain: Not on file   • Food insecurity     Worry: Not on file     Inability: Not on file   • Transportation needs     Medical: Not on file     Non-medical: Not on file   Tobacco Use   • Smoking status: Never Smoker   • Smokeless tobacco: Former User   • Tobacco comment: Chewed for 5 years   Substance and Sexual Activity   • Alcohol use: No   • Drug use: No   • Sexual activity: Not on file   Lifestyle   • Physical activity     Days per week: Not on file     Minutes per session: Not on file   • Stress: Not on file   Relationships   • Social connections     Talks  on phone: Not on file     Gets together: Not on file     Attends Congregational service: Not on file     Active member of club or organization: Not on file     Attends meetings of clubs or organizations: Not on file     Relationship status: Not on file   • Intimate partner violence     Fear of current or ex partner: Not on file     Emotionally abused: Not on file     Physically abused: Not on file     Forced sexual activity: Not on file   Other Topics Concern   • Not on file   Social History Narrative   • Not on file     No Known Allergies  Outpatient Encounter Medications as of 2/4/2021   Medication Sig Dispense Refill   • ezetimibe (ZETIA) 10 MG Tab Take 1 Tab by mouth every day. 90 Tab 3   • enalapril (VASOTEC) 5 MG Tab Take 1 Tab by mouth every day. 90 Tab 11   • atorvastatin (LIPITOR) 80 MG tablet Take 1 Tab by mouth every morning. 90 Tab 3   • fluticasone (FLONASE) 50 MCG/ACT nasal spray Administer 1 Spray into affected nostril(S).     • omeprazole (PRILOSEC) 40 MG delayed-release capsule Take 40 mg by mouth every evening.     • Cholecalciferol (VITAMIN D) 2000 units Cap Take  by mouth.     • VENTOLIN  (90 Base) MCG/ACT Aero Soln inhalation aerosol Inhale 1-2 Puffs by mouth as needed.     • Spacer/Aero-Holding Chambers (OPTICHAMBER EZ) Misc USE AS DIRECTED  0   • oxyCODONE immediate release (ROXICODONE) 10 MG immediate release tablet Take 10 mg by mouth as needed for Moderate Pain.     • aspirin EC 81 MG EC tablet Take 1 Tab by mouth every day. 30 Tab    • [DISCONTINUED] carvedilol (COREG) 3.125 MG Tab Take 1 Tab by mouth 2 times a day. 180 Tab 3   • [DISCONTINUED] enalapril (VASOTEC) 2.5 MG Tab Take 1 Tab by mouth every day. 90 Tab 3   • [DISCONTINUED] atorvastatin (LIPITOR) 80 MG tablet Take 1 Tab by mouth every evening. 90 Tab 3   • senna-docusate (PERICOLACE OR SENOKOT S) 8.6-50 MG Tab Take 1 Tab by mouth every day.       No facility-administered encounter medications on file as of 2/4/2021.   "    Review of Systems   Constitutional: Negative.  Negative for chills, fever and malaise/fatigue.   HENT: Negative.  Negative for sore throat.    Eyes: Negative.    Respiratory: Negative.  Negative for cough, hemoptysis, sputum production, shortness of breath, wheezing and stridor.    Cardiovascular: Negative.  Negative for chest pain, palpitations, orthopnea, claudication, leg swelling and PND.   Gastrointestinal: Negative.    Genitourinary: Negative.    Musculoskeletal: Negative.    Skin: Negative.    Neurological: Negative.  Negative for dizziness, loss of consciousness and weakness.   Endo/Heme/Allergies: Negative.  Does not bruise/bleed easily.   All other systems reviewed and are negative.       Objective:   /84 (BP Location: Right arm, Patient Position: Sitting, BP Cuff Size: Adult)   Pulse 62   Resp 16   Ht 1.88 m (6' 2\")   Wt 96.6 kg (213 lb)   SpO2 97%   BMI 27.35 kg/m²     Physical Exam   Constitutional: He appears well-developed and well-nourished. No distress.   HENT:   Head: Normocephalic and atraumatic.   Right Ear: External ear normal.   Left Ear: External ear normal.   Nose: Nose normal.   Mouth/Throat: No oropharyngeal exudate.   Eyes: Pupils are equal, round, and reactive to light. Conjunctivae and EOM are normal. Right eye exhibits no discharge. Left eye exhibits no discharge. No scleral icterus.   Neck: Neck supple. No JVD present.   Cardiovascular: Normal rate, regular rhythm and intact distal pulses. Exam reveals no gallop and no friction rub.   No murmur heard.  Pulmonary/Chest: Effort normal. No stridor. No respiratory distress. He has no wheezes. He has no rales. He exhibits no tenderness.   Abdominal: Soft. He exhibits no distension. There is no guarding.   Musculoskeletal: Normal range of motion.         General: No tenderness, deformity or edema.   Neurological: He is alert. He has normal reflexes. He displays normal reflexes. No cranial nerve deficit. He exhibits normal " muscle tone. Coordination normal.   Skin: Skin is warm and dry. No rash noted. He is not diaphoretic. No erythema. No pallor.   Psychiatric: He has a normal mood and affect. His behavior is normal. Judgment and thought content normal.   Nursing note and vitals reviewed.  Echocardiogram: Dated 7/28/2018 personally viewed interpreted myself showing an EF of 65% otherwise normal.    Cardiac catheterization: Dated 2/2/2018 personally viewed interpreted myself showing:  POSTPROCEDURE DIAGNOSES:  1.  Coronary artery disease, 2-vessel with complex proximal left anterior descending  artery serial 95% and 90% stenoses including bifurcation disease involving the diagonal  branch and the distal circumflex artery.  2.  Left ventricular ejection fraction 41% with severe hypokinesis and akinesis of the  anterior wall and anterior apical walls.     Lab Results   Component Value Date/Time    CHOLSTRLTOT 167 01/29/2021 12:16 PM     (H) 01/29/2021 12:16 PM    HDL 50 01/29/2021 12:16 PM    TRIGLYCERIDE 75 01/29/2021 12:16 PM       Lab Results   Component Value Date/Time    SODIUM 138 01/29/2021 12:16 PM    POTASSIUM 4.0 01/29/2021 12:16 PM    CHLORIDE 103 01/29/2021 12:16 PM    CO2 27 01/29/2021 12:16 PM    GLUCOSE 92 01/29/2021 12:16 PM    BUN 15 01/29/2021 12:16 PM    CREATININE 0.81 01/29/2021 12:16 PM     Lab Results   Component Value Date/Time    ALKPHOSPHAT 79 01/29/2021 12:16 PM    ASTSGOT 16 01/29/2021 12:16 PM    ALTSGPT 21 01/29/2021 12:16 PM    TBILIRUBIN 0.8 01/29/2021 12:16 PM        Assessment:     1. ACS (acute coronary syndrome) (HCC)     2. Coronary artery disease involving native coronary artery of native heart without angina pectoris  ezetimibe (ZETIA) 10 MG Tab    enalapril (VASOTEC) 5 MG Tab    Comp Metabolic Panel    Lipid Profile   3. High risk medication use  ezetimibe (ZETIA) 10 MG Tab    enalapril (VASOTEC) 5 MG Tab    Comp Metabolic Panel    Lipid Profile   4. Hx of CABG  ezetimibe (ZETIA) 10 MG Tab     Comp Metabolic Panel    Lipid Profile    atorvastatin (LIPITOR) 80 MG tablet   5. Ischemic cardiomyopathy  enalapril (VASOTEC) 5 MG Tab    Comp Metabolic Panel    atorvastatin (LIPITOR) 80 MG tablet   6. Mixed hyperlipidemia  enalapril (VASOTEC) 5 MG Tab    Comp Metabolic Panel    Lipid Profile    atorvastatin (LIPITOR) 80 MG tablet   7. SOB (shortness of breath)  enalapril (VASOTEC) 5 MG Tab   8. Elevated lipoprotein(a)         Medical Decision Making:  Today's Assessment / Status / Plan:     60-year-old male with a history of CAD status post CABG.  For his high risk medications we will check a number of labs.  I asked him to switch his atorvastatin to a.m. dosing for better compliance.  We will stop the carvedilol and switch to enalapril 5 mg once per day.  I will also add on Zetia to his regimen of antiepidemic medications.  We will recheck his labs prior to his follow-up in 6 months.

## 2021-08-06 ENCOUNTER — OFFICE VISIT (OUTPATIENT)
Dept: CARDIOLOGY | Facility: MEDICAL CENTER | Age: 61
End: 2021-08-06
Payer: MEDICARE

## 2021-08-06 ENCOUNTER — TELEPHONE (OUTPATIENT)
Dept: VASCULAR LAB | Facility: MEDICAL CENTER | Age: 61
End: 2021-08-06

## 2021-08-06 VITALS
HEIGHT: 74 IN | DIASTOLIC BLOOD PRESSURE: 72 MMHG | OXYGEN SATURATION: 96 % | SYSTOLIC BLOOD PRESSURE: 112 MMHG | WEIGHT: 205 LBS | HEART RATE: 66 BPM | BODY MASS INDEX: 26.31 KG/M2

## 2021-08-06 DIAGNOSIS — Z79.899 HIGH RISK MEDICATION USE: ICD-10-CM

## 2021-08-06 DIAGNOSIS — E78.2 MIXED HYPERLIPIDEMIA: ICD-10-CM

## 2021-08-06 DIAGNOSIS — R06.02 SOB (SHORTNESS OF BREATH): ICD-10-CM

## 2021-08-06 DIAGNOSIS — Z95.1 HX OF CABG: ICD-10-CM

## 2021-08-06 DIAGNOSIS — I25.10 CORONARY ARTERY DISEASE INVOLVING NATIVE CORONARY ARTERY OF NATIVE HEART WITHOUT ANGINA PECTORIS: ICD-10-CM

## 2021-08-06 DIAGNOSIS — I24.9 ACS (ACUTE CORONARY SYNDROME) (HCC): ICD-10-CM

## 2021-08-06 DIAGNOSIS — E78.41 ELEVATED LIPOPROTEIN(A): ICD-10-CM

## 2021-08-06 DIAGNOSIS — I25.5 ISCHEMIC CARDIOMYOPATHY: ICD-10-CM

## 2021-08-06 PROCEDURE — 99214 OFFICE O/P EST MOD 30 MIN: CPT | Performed by: INTERNAL MEDICINE

## 2021-08-06 RX ORDER — ATORVASTATIN CALCIUM 80 MG/1
80 TABLET, FILM COATED ORAL EVERY MORNING
Qty: 90 TABLET | Refills: 3 | Status: SHIPPED | OUTPATIENT
Start: 2021-08-06 | End: 2022-02-18 | Stop reason: SDUPTHER

## 2021-08-06 RX ORDER — EZETIMIBE 10 MG/1
10 TABLET ORAL DAILY
Qty: 90 TABLET | Refills: 3 | Status: SHIPPED | OUTPATIENT
Start: 2021-08-06 | End: 2021-10-18

## 2021-08-06 ASSESSMENT — ENCOUNTER SYMPTOMS
MUSCULOSKELETAL NEGATIVE: 1
ORTHOPNEA: 0
COUGH: 0
NEUROLOGICAL NEGATIVE: 1
SORE THROAT: 0
PND: 0
PALPITATIONS: 0
SHORTNESS OF BREATH: 0
CONSTITUTIONAL NEGATIVE: 1
STRIDOR: 0
HEMOPTYSIS: 0
BRUISES/BLEEDS EASILY: 0
CLAUDICATION: 0
DIZZINESS: 0
CHILLS: 0
WHEEZING: 0
FEVER: 0
RESPIRATORY NEGATIVE: 1
CARDIOVASCULAR NEGATIVE: 1
WEAKNESS: 0
LOSS OF CONSCIOUSNESS: 0
GASTROINTESTINAL NEGATIVE: 1
SPUTUM PRODUCTION: 0
EYES NEGATIVE: 1

## 2021-08-06 NOTE — PROGRESS NOTES
Chief Complaint   Patient presents with   • Coronary Artery Disease   • Hyperlipidemia   • Shortness of Breath   • Cardiomyopathy (Ischemic)       Subjective:   Anthony Reyes is a 60 y.o. male who presents today as a follow-up from his CAD status post CABG hypertension hyperlipidemia.  He had no risk factors prior to his bypass surgery.    He was last seen he had a change in status.  His last LDL continues to be elevated.  Is on Zetia and atorvastatin.  He has no chest pain or shortness of breath.  Blood pressures well controlled.  He is physically active daily with no functional notations.    Past Medical History:   Diagnosis Date   • CAD (coronary artery disease) 02/03/2017    CABGx3     Past Surgical History:   Procedure Laterality Date   • MULTIPLE CORONARY ARTERY BYPASS ENDO VEIN HARVEST  2/3/2018    Procedure: MULTIPLE CORONARY ARTERY BYPASS X3, ENDO VEIN HARVEST RIGHT LOWER LEG;  Surgeon: Fredrick Burger M.D.;  Location: SURGERY [facility];  Service: Cardiothoracic   • PB  2/3/2018    Procedure: PB;  Surgeon: Fredrick Burger M.D.;  Location: SURGERY [facility];  Service: Cardiothoracic   • ANKLE ORIF Right      Family History   Problem Relation Age of Onset   • Heart Disease Maternal Uncle 54        CABG     Social History     Socioeconomic History   • Marital status:      Spouse name: Not on file   • Number of children: Not on file   • Years of education: Not on file   • Highest education level: Not on file   Occupational History   • Not on file   Tobacco Use   • Smoking status: Never Smoker   • Smokeless tobacco: Former User   • Tobacco comment: Chewed for 5 years   Vaping Use   • Vaping Use: Never used   Substance and Sexual Activity   • Alcohol use: No   • Drug use: No   • Sexual activity: Not on file   Other Topics Concern   • Not on file   Social History Narrative   • Not on file     Social Determinants of Health     Financial Resource Strain:    • Difficulty of Paying Living  Expenses:    Food Insecurity:    • Worried About Running Out of Food in the Last Year:    • Ran Out of Food in the Last Year:    Transportation Needs:    • Lack of Transportation (Medical):    • Lack of Transportation (Non-Medical):    Physical Activity:    • Days of Exercise per Week:    • Minutes of Exercise per Session:    Stress:    • Feeling of Stress :    Social Connections:    • Frequency of Communication with Friends and Family:    • Frequency of Social Gatherings with Friends and Family:    • Attends Rastafarian Services:    • Active Member of Clubs or Organizations:    • Attends Club or Organization Meetings:    • Marital Status:    Intimate Partner Violence:    • Fear of Current or Ex-Partner:    • Emotionally Abused:    • Physically Abused:    • Sexually Abused:      No Known Allergies  Outpatient Encounter Medications as of 8/6/2021   Medication Sig Dispense Refill   • ezetimibe (ZETIA) 10 MG Tab Take 1 tablet by mouth every day. 90 tablet 3   • atorvastatin (LIPITOR) 80 MG tablet Take 1 tablet by mouth every morning. 90 tablet 3   • enalapril (VASOTEC) 5 MG Tab Take 1 Tab by mouth every day. 90 Tab 11   • omeprazole (PRILOSEC) 40 MG delayed-release capsule Take 40 mg by mouth every evening.     • aspirin EC 81 MG EC tablet Take 1 Tab by mouth every day. 30 Tab    • [DISCONTINUED] ezetimibe (ZETIA) 10 MG Tab Take 1 Tab by mouth every day. 90 Tab 3   • [DISCONTINUED] atorvastatin (LIPITOR) 80 MG tablet Take 1 Tab by mouth every morning. 90 Tab 3   • [DISCONTINUED] fluticasone (FLONASE) 50 MCG/ACT nasal spray Administer 1 Spray into affected nostril(S). (Patient not taking: Reported on 8/6/2021)     • [DISCONTINUED] Cholecalciferol (VITAMIN D) 2000 units Cap Take  by mouth. (Patient not taking: Reported on 8/6/2021)     • [DISCONTINUED] VENTOLIN  (90 Base) MCG/ACT Aero Soln inhalation aerosol Inhale 1-2 Puffs by mouth as needed. (Patient not taking: Reported on 8/6/2021)     • [DISCONTINUED]  "Spacer/Aero-Holding Chambers (OPTICHAMBER EZ) [facility] [place] USE AS DIRECTED (Patient not taking: Reported on 8/6/2021)  0   • [DISCONTINUED] oxyCODONE immediate release (ROXICODONE) 10 MG immediate release tablet Take 10 mg by mouth as needed for Moderate Pain. (Patient not taking: Reported on 8/6/2021)     • [DISCONTINUED] senna-docusate (PERICOLACE OR SENOKOT S) 8.6-50 MG Tab Take 1 Tab by mouth every day. (Patient not taking: Reported on 8/6/2021)       No facility-administered encounter medications on file as of 8/6/2021.     Review of Systems   Constitutional: Negative.  Negative for chills, fever and malaise/fatigue.   HENT: Negative.  Negative for sore throat.    Eyes: Negative.    Respiratory: Negative.  Negative for cough, hemoptysis, sputum production, shortness of breath, wheezing and stridor.    Cardiovascular: Negative.  Negative for chest pain, palpitations, orthopnea, claudication, leg swelling and PND.   Gastrointestinal: Negative.    Genitourinary: Negative.    Musculoskeletal: Negative.    Skin: Negative.    Neurological: Negative.  Negative for dizziness, loss of consciousness and weakness.   Endo/Heme/Allergies: Negative.  Does not bruise/bleed easily.   All other systems reviewed and are negative.       Objective:   /72 (BP Location: Right arm, Patient Position: Sitting, BP Cuff Size: Adult)   Pulse 66   Ht 1.88 m (6' 2\")   Wt 93 kg (205 lb)   SpO2 96%   BMI 26.32 kg/m²     Physical Exam   Constitutional: He appears well-developed. No distress.   HENT:   Head: Normocephalic and atraumatic.   Right Ear: External ear normal.   Left Ear: External ear normal.   Nose: Nose normal.   Mouth/Throat: No oropharyngeal exudate.   Eyes: Pupils are equal, round, and reactive to light. Conjunctivae are normal. Right eye exhibits no discharge. Left eye exhibits no discharge. No scleral icterus.   Neck: No JVD present.   Cardiovascular: Normal rate and regular rhythm. Exam reveals no gallop and no " friction rub.   No murmur heard.  Pulmonary/Chest: Effort normal. No stridor. No respiratory distress. He has no wheezes. He has no rales. He exhibits no tenderness.   Abdominal: Soft. He exhibits no distension. There is no guarding.   Musculoskeletal:         General: No tenderness or deformity. Normal range of motion.      Cervical back: Neck supple.   Neurological: He is alert. He has normal reflexes. He displays normal reflexes. No cranial nerve deficit. He exhibits normal muscle tone. Coordination normal.   Skin: Skin is warm and dry. No rash noted. He is not diaphoretic. No erythema. No pallor.   Psychiatric: His behavior is normal. Judgment and thought content normal.   Nursing note and vitals reviewed.  Echocardiogram: Dated 7/28/2018 personally viewed interpreted myself showing an EF of 65% otherwise normal.    Cardiac catheterization: Dated 2/2/2018 personally viewed interpreted myself showing:  POSTPROCEDURE DIAGNOSES:  1.  Coronary artery disease, 2-vessel with complex proximal left anterior descending  artery serial 95% and 90% stenoses including bifurcation disease involving the diagonal  branch and the distal circumflex artery.  2.  Left ventricular ejection fraction 41% with severe hypokinesis and akinesis of the  anterior wall and anterior apical walls.     Lab Results   Component Value Date/Time    CHOLSTRLTOT 167 01/29/2021 12:16 PM     (H) 01/29/2021 12:16 PM    HDL 50 01/29/2021 12:16 PM    TRIGLYCERIDE 75 01/29/2021 12:16 PM       Lab Results   Component Value Date/Time    SODIUM 138 01/29/2021 12:16 PM    POTASSIUM 4.0 01/29/2021 12:16 PM    CHLORIDE 103 01/29/2021 12:16 PM    CO2 27 01/29/2021 12:16 PM    GLUCOSE 92 01/29/2021 12:16 PM    BUN 15 01/29/2021 12:16 PM    CREATININE 0.81 01/29/2021 12:16 PM     Lab Results   Component Value Date/Time    ALKPHOSPHAT 79 01/29/2021 12:16 PM    ASTSGOT 16 01/29/2021 12:16 PM    ALTSGPT 21 01/29/2021 12:16 PM    TBILIRUBIN 0.8 01/29/2021 12:16  PM        Assessment:     1. ACS (acute coronary syndrome) (HCC)  REFERRAL TO PHARMACOTHERAPY SERVICE   2. Coronary artery disease involving native coronary artery of native heart without angina pectoris  ezetimibe (ZETIA) 10 MG Tab    REFERRAL TO PHARMACOTHERAPY SERVICE   3. Elevated lipoprotein(a)  REFERRAL TO PHARMACOTHERAPY SERVICE   4. High risk medication use  ezetimibe (ZETIA) 10 MG Tab    REFERRAL TO PHARMACOTHERAPY SERVICE   5. Hx of CABG  ezetimibe (ZETIA) 10 MG Tab    atorvastatin (LIPITOR) 80 MG tablet   6. Ischemic cardiomyopathy  atorvastatin (LIPITOR) 80 MG tablet   7. Mixed hyperlipidemia  atorvastatin (LIPITOR) 80 MG tablet   8. SOB (shortness of breath)         Medical Decision Making:  Today's Assessment / Status / Plan:     60-year-old male with a history of CAD status post CABG.  For his high risk medications we will check a number of labs.  His cholesterol continues to be elevated.  I will make referral to the pharmacotherapy service to start him on Praluent.  Otherwise I refilled his atorvastatin and Zetia.  I will see him back in 1 year.

## 2021-08-06 NOTE — TELEPHONE ENCOUNTER
Left voicemail message for patient to return call to [facility] to establish care      lipids    Sheryl Clifton, Clinical Pharmacist, CDE, CACP

## 2021-08-12 ENCOUNTER — DOCUMENTATION (OUTPATIENT)
Dept: VASCULAR LAB | Facility: MEDICAL CENTER | Age: 61
End: 2021-08-12

## 2021-08-13 NOTE — PROGRESS NOTES
Called and LVM asking pt to c/b to reschedule missed CHF pharmacotherapy appt.    Tirso Grijalva, TeresitaD

## 2021-08-19 ENCOUNTER — NON-PROVIDER VISIT (OUTPATIENT)
Dept: CARDIOLOGY | Facility: MEDICAL CENTER | Age: 61
End: 2021-08-19
Payer: MEDICARE

## 2021-08-19 ENCOUNTER — TELEPHONE (OUTPATIENT)
Dept: CARDIOLOGY | Facility: MEDICAL CENTER | Age: 61
End: 2021-08-19

## 2021-08-19 VITALS
SYSTOLIC BLOOD PRESSURE: 124 MMHG | BODY MASS INDEX: 27.09 KG/M2 | WEIGHT: 211 LBS | HEART RATE: 64 BPM | DIASTOLIC BLOOD PRESSURE: 88 MMHG

## 2021-08-19 DIAGNOSIS — I24.9 ACS (ACUTE CORONARY SYNDROME) (HCC): ICD-10-CM

## 2021-08-19 DIAGNOSIS — E78.41 ELEVATED LIPOPROTEIN(A): ICD-10-CM

## 2021-08-19 DIAGNOSIS — I25.10 CORONARY ARTERY DISEASE INVOLVING NATIVE CORONARY ARTERY OF NATIVE HEART WITHOUT ANGINA PECTORIS: ICD-10-CM

## 2021-08-19 DIAGNOSIS — E78.2 MIXED HYPERLIPIDEMIA: ICD-10-CM

## 2021-08-19 PROCEDURE — 99211 OFF/OP EST MAY X REQ PHY/QHP: CPT | Performed by: INTERNAL MEDICINE

## 2021-08-19 NOTE — TELEPHONE ENCOUNTER
JULIETTE Neff called from the [facility] to advise the Pt needs prior auth for the Alirocumab 75 MG/ML Solution Auto-injector. They can be reached at [phone]    Thank you,  Carolyn RODRIGUEZ
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
## 2021-08-23 ENCOUNTER — DOCUMENTATION (OUTPATIENT)
Dept: VASCULAR LAB | Facility: MEDICAL CENTER | Age: 61
End: 2021-08-23

## 2021-08-23 NOTE — PROGRESS NOTES
I have submitted the patient's via [facility] for the patient's Praluent.    Awaiting for [employer] to response questions for the PA to be completed.     [employer] is processing your PA request and will respond shortly with next steps. You may close this dialog, return to your dashboard, and perform other tasks. To check for an update later, open this request again from your dashboard.    Anthony Reyes Christianson: [id number]

## 2021-08-24 ENCOUNTER — TELEPHONE (OUTPATIENT)
Dept: CARDIOLOGY | Facility: MEDICAL CENTER | Age: 61
End: 2021-08-24

## 2021-08-24 NOTE — TELEPHONE ENCOUNTER
SALLIE GREER Key: [id number] help? Call us at [phone]  Outcome  Additional Information Required  Prior Authorization Not Required  Drug  Praluent 75MG/ML auto-injectors  Form  [employer] Medicare Electronic Prior Authorization Request Form (2017 NCPDP)

## 2021-08-24 NOTE — TELEPHONE ENCOUNTER
The following medications may be covered  Using available formulary data, we have determined that the following medications may be covered:    PA Requirement  Atorvastatin 10 Mg Tab, PA Not Required  Repatha SureClick, PA Required  Repatha Syringe, PA Required  Repatha Pushtronex, PA Required  Rosuvastatin 10 Mg Tab, PA Not Required  Ezetimibe, PA Not Required

## 2021-08-24 NOTE — TELEPHONE ENCOUNTER
SALLIE GREER Key: [id number] help? Call us at [phone]  Status  Sent to [employer]  Drug  Praluent 75MG/ML auto-injectors  Form  [employer] Electronic Prior Authorization Request Form (2017 NCPDP)

## 2021-08-27 PROCEDURE — RXMED WILLOW AMBULATORY MEDICATION CHARGE: Performed by: INTERNAL MEDICINE

## 2021-09-02 NOTE — NON-PROVIDER
Praluent PA was approved. Rx ready at [facility] for $4 copay. [facility] and [facility].     Tirso Grijalva PharmD

## 2021-09-03 ENCOUNTER — PHARMACY VISIT (OUTPATIENT)
Dept: PHARMACY | Facility: MEDICAL CENTER | Age: 61
End: 2021-09-03
Payer: MEDICARE

## 2021-09-30 ENCOUNTER — APPOINTMENT (OUTPATIENT)
Dept: CARDIOLOGY | Facility: MEDICAL CENTER | Age: 61
End: 2021-09-30
Payer: MEDICARE

## 2021-10-11 ENCOUNTER — PHARMACY VISIT (OUTPATIENT)
Dept: PHARMACY | Facility: MEDICAL CENTER | Age: 61
End: 2021-10-11
Payer: MEDICARE

## 2021-10-11 ENCOUNTER — HOSPITAL ENCOUNTER (OUTPATIENT)
Dept: LAB | Facility: MEDICAL CENTER | Age: 61
End: 2021-10-11
Attending: INTERNAL MEDICINE
Payer: MEDICARE

## 2021-10-11 DIAGNOSIS — E78.2 MIXED HYPERLIPIDEMIA: ICD-10-CM

## 2021-10-11 LAB
ALBUMIN SERPL BCP-MCNC: 4.4 G/DL (ref 3.2–4.9)
ALBUMIN/GLOB SERPL: 1.9 G/DL
ALP SERPL-CCNC: 83 U/L (ref 30–99)
ALT SERPL-CCNC: 32 U/L (ref 2–50)
ANION GAP SERPL CALC-SCNC: 9 MMOL/L (ref 7–16)
AST SERPL-CCNC: 15 U/L (ref 12–45)
BILIRUB SERPL-MCNC: 0.4 MG/DL (ref 0.1–1.5)
BUN SERPL-MCNC: 22 MG/DL (ref 8–22)
CALCIUM SERPL-MCNC: 9.5 MG/DL (ref 8.5–10.5)
CHLORIDE SERPL-SCNC: 109 MMOL/L (ref 96–112)
CHOLEST SERPL-MCNC: 103 MG/DL (ref 100–199)
CO2 SERPL-SCNC: 23 MMOL/L (ref 20–33)
CREAT SERPL-MCNC: 0.71 MG/DL (ref 0.5–1.4)
FASTING STATUS PATIENT QL REPORTED: NORMAL
GLOBULIN SER CALC-MCNC: 2.3 G/DL (ref 1.9–3.5)
GLUCOSE SERPL-MCNC: 118 MG/DL (ref 65–99)
HDLC SERPL-MCNC: 56 MG/DL
LDLC SERPL CALC-MCNC: 41 MG/DL
POTASSIUM SERPL-SCNC: 4.4 MMOL/L (ref 3.6–5.5)
PROT SERPL-MCNC: 6.7 G/DL (ref 6–8.2)
SODIUM SERPL-SCNC: 141 MMOL/L (ref 135–145)
TRIGL SERPL-MCNC: 32 MG/DL (ref 0–149)

## 2021-10-11 PROCEDURE — RXMED WILLOW AMBULATORY MEDICATION CHARGE: Performed by: INTERNAL MEDICINE

## 2021-10-11 PROCEDURE — 80061 LIPID PANEL: CPT

## 2021-10-11 PROCEDURE — 80053 COMPREHEN METABOLIC PANEL: CPT

## 2021-10-11 PROCEDURE — 36415 COLL VENOUS BLD VENIPUNCTURE: CPT

## 2021-10-18 ENCOUNTER — NON-PROVIDER VISIT (OUTPATIENT)
Dept: CARDIOLOGY | Facility: MEDICAL CENTER | Age: 61
End: 2021-10-18
Payer: MEDICARE

## 2021-10-18 VITALS — WEIGHT: 206 LBS | BODY MASS INDEX: 26.45 KG/M2

## 2021-10-18 DIAGNOSIS — E78.41 ELEVATED LIPOPROTEIN(A): ICD-10-CM

## 2021-10-18 DIAGNOSIS — E78.2 MIXED HYPERLIPIDEMIA: ICD-10-CM

## 2021-10-18 PROCEDURE — 99211 OFF/OP EST MAY X REQ PHY/QHP: CPT | Performed by: INTERNAL MEDICINE
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
## 2021-11-08 PROCEDURE — RXMED WILLOW AMBULATORY MEDICATION CHARGE: Performed by: INTERNAL MEDICINE

## 2021-11-15 ENCOUNTER — PHARMACY VISIT (OUTPATIENT)
Dept: PHARMACY | Facility: MEDICAL CENTER | Age: 61
End: 2021-11-15
Payer: MEDICARE

## 2021-12-06 PROCEDURE — RXMED WILLOW AMBULATORY MEDICATION CHARGE: Performed by: INTERNAL MEDICINE

## 2021-12-13 ENCOUNTER — PHARMACY VISIT (OUTPATIENT)
Dept: PHARMACY | Facility: MEDICAL CENTER | Age: 61
End: 2021-12-13
Payer: MEDICARE

## 2022-01-12 ENCOUNTER — PHARMACY VISIT (OUTPATIENT)
Dept: PHARMACY | Facility: MEDICAL CENTER | Age: 62
End: 2022-01-12
Payer: MEDICARE

## 2022-01-12 ENCOUNTER — TELEPHONE (OUTPATIENT)
Dept: CARDIOLOGY | Facility: MEDICAL CENTER | Age: 62
End: 2022-01-12

## 2022-01-12 DIAGNOSIS — I24.9 ACS (ACUTE CORONARY SYNDROME) (HCC): ICD-10-CM

## 2022-01-12 DIAGNOSIS — I25.10 CORONARY ARTERY DISEASE INVOLVING NATIVE CORONARY ARTERY OF NATIVE HEART WITHOUT ANGINA PECTORIS: ICD-10-CM

## 2022-01-12 DIAGNOSIS — E78.41 ELEVATED LIPOPROTEIN(A): ICD-10-CM

## 2022-01-12 DIAGNOSIS — E78.2 MIXED HYPERLIPIDEMIA: ICD-10-CM

## 2022-01-12 PROCEDURE — RXMED WILLOW AMBULATORY MEDICATION CHARGE: Performed by: INTERNAL MEDICINE

## 2022-01-12 NOTE — TELEPHONE ENCOUNTER
----- Message from Pauline Mcgee, Med Ass't sent at 1/12/2022  1:00 PM PST -----  Regarding: Need Follow Appointment  Please contact patient to schedule follow up appointment. Thank you

## 2022-01-14 ENCOUNTER — HOSPITAL ENCOUNTER (OUTPATIENT)
Dept: LAB | Facility: MEDICAL CENTER | Age: 62
End: 2022-01-14
Attending: INTERNAL MEDICINE
Payer: MEDICARE

## 2022-01-14 DIAGNOSIS — E78.41 ELEVATED LIPOPROTEIN(A): ICD-10-CM

## 2022-01-14 DIAGNOSIS — E78.2 MIXED HYPERLIPIDEMIA: ICD-10-CM

## 2022-01-14 LAB
ALBUMIN SERPL BCP-MCNC: 4.5 G/DL (ref 3.2–4.9)
ALBUMIN/GLOB SERPL: 2 G/DL
ALP SERPL-CCNC: 95 U/L (ref 30–99)
ALT SERPL-CCNC: 22 U/L (ref 2–50)
ANION GAP SERPL CALC-SCNC: 11 MMOL/L (ref 7–16)
AST SERPL-CCNC: 16 U/L (ref 12–45)
BILIRUB SERPL-MCNC: 0.5 MG/DL (ref 0.1–1.5)
BUN SERPL-MCNC: 20 MG/DL (ref 8–22)
CALCIUM SERPL-MCNC: 9.5 MG/DL (ref 8.5–10.5)
CHLORIDE SERPL-SCNC: 110 MMOL/L (ref 96–112)
CHOLEST SERPL-MCNC: 97 MG/DL (ref 100–199)
CO2 SERPL-SCNC: 20 MMOL/L (ref 20–33)
CREAT SERPL-MCNC: 0.87 MG/DL (ref 0.5–1.4)
FASTING STATUS PATIENT QL REPORTED: NORMAL
GLOBULIN SER CALC-MCNC: 2.3 G/DL (ref 1.9–3.5)
GLUCOSE SERPL-MCNC: 117 MG/DL (ref 65–99)
HDLC SERPL-MCNC: 48 MG/DL
LDLC SERPL CALC-MCNC: 40 MG/DL
POTASSIUM SERPL-SCNC: 4.3 MMOL/L (ref 3.6–5.5)
PROT SERPL-MCNC: 6.8 G/DL (ref 6–8.2)
SODIUM SERPL-SCNC: 141 MMOL/L (ref 135–145)
TRIGL SERPL-MCNC: 45 MG/DL (ref 0–149)

## 2022-01-14 PROCEDURE — 80061 LIPID PANEL: CPT

## 2022-01-14 PROCEDURE — 36415 COLL VENOUS BLD VENIPUNCTURE: CPT

## 2022-01-14 PROCEDURE — 80053 COMPREHEN METABOLIC PANEL: CPT

## 2022-01-17 ENCOUNTER — NON-PROVIDER VISIT (OUTPATIENT)
Dept: CARDIOLOGY | Facility: MEDICAL CENTER | Age: 62
End: 2022-01-17
Payer: MEDICARE

## 2022-01-17 VITALS — WEIGHT: 210 LBS | BODY MASS INDEX: 26.96 KG/M2

## 2022-01-17 DIAGNOSIS — E78.41 ELEVATED LIPOPROTEIN(A): ICD-10-CM

## 2022-01-17 DIAGNOSIS — I24.9 ACS (ACUTE CORONARY SYNDROME) (HCC): ICD-10-CM

## 2022-01-17 DIAGNOSIS — I25.10 CORONARY ARTERY DISEASE INVOLVING NATIVE CORONARY ARTERY OF NATIVE HEART WITHOUT ANGINA PECTORIS: ICD-10-CM

## 2022-01-17 DIAGNOSIS — E78.2 MIXED HYPERLIPIDEMIA: ICD-10-CM

## 2022-01-17 PROCEDURE — 99211 OFF/OP EST MAY X REQ PHY/QHP: CPT | Performed by: STUDENT IN AN ORGANIZED HEALTH CARE EDUCATION/TRAINING PROGRAM

## 2022-01-17 RX ORDER — MULTIVIT WITH MINERALS/LUTEIN
1000 TABLET ORAL DAILY
COMMUNITY

## 2022-01-17 RX ORDER — CHLORAL HYDRATE 500 MG
1000 CAPSULE ORAL DAILY
COMMUNITY

## 2022-01-17 NOTE — NON-PROVIDER
[facility] - FollowUp Visit  Date of Service: 01/17/22    Anthony Reyes is here for follow up of dyslipidemia.    Subjective    HPI  Pertinent Interval History since last visit:   Since pt's last visit, he DC'd his ezetimibe per pharmacotherapy clinic instruction.  Current Prescription Lipid Lowering Medications - including dose:   Statin: Atorvastatin 80 mg once daily  Non-Statin: Praluent 75 mg once every 14 days  Current Lipid Lowering and Related Supplements:   Fish Oil 1g once daily  Any Current Side Effects Potentially Related to Lipid Lowering therapy?   No - some nausea after taking all of his PO meds. No more SE attributed to PCSK9i therapy - states no more bloating.  Current Adherence to Lipid Lowering Therapies:  Complete  Any Previous History of Statin Intolerance?   No  Baseline Lipids Prior to Treatment:   Ref. Range 2/2/2018 23:09   Cholesterol,Tot Latest Ref Range: 100 - 199 mg/dL 157   Triglycerides Latest Ref Range: 0 - 149 mg/dL 99   HDL Latest Ref Range: >=40 mg/dL 36 (A)   LDL Latest Ref Range: <100 mg/dL 101 (H)       SOCIAL HISTORY  Social History     Tobacco Use   Smoking Status Never Smoker   Smokeless Tobacco Former User   Tobacco Comment    Chewed for 5 years      Change in weight: Stable - pt is up ~ 4 lbs since last visit. Pt states he's less active this time of year  Exercise habits: no regular exercise program w/ the winter weather  Diet: Tuna, spaghetti and meatballs, salads, mostly chicken, white meat, avoids carbs and red meat - will occasionally splurge w/ a steak.      Objective    There were no vitals filed for this visit.   Physical Exam    DATA REVIEW  Most Recent Lipid Panel:   Lab Results   Component Value Date    CHOLSTRLTOT 97 (L) 01/14/2022    TRIGLYCERIDE 45 01/14/2022    HDL 48 01/14/2022    LDL 40 01/14/2022       Other Pertinent Blood Work:   Lab Results   Component Value Date    SODIUM 141 01/14/2022    POTASSIUM 4.3 01/14/2022    CHLORIDE 110  01/14/2022    CO2 20 01/14/2022    ANION 11.0 01/14/2022    GLUCOSE 117 (H) 01/14/2022    BUN 20 01/14/2022    CREATININE 0.87 01/14/2022    CALCIUM 9.5 01/14/2022    ASTSGOT 16 01/14/2022    ALTSGPT 22 01/14/2022    ALKPHOSPHAT 95 01/14/2022    TBILIRUBIN 0.5 01/14/2022    ALBUMIN 4.5 01/14/2022    AGRATIO 2.0 01/14/2022    LIPOPROTA 131 (H) 01/29/2021       Other:  NA    Recent Imaging Studies:    None since last visit        ASSESSMENT AND PLAN  Patient Type, check all that apply:   Secondary Prevention  Established Atherosclerotic Cardiovascular Disease (ASCVD)  Yes, Details: hx of MI + CABG in 2019  Other Established (non-atherosclerotic) Vascular Disease, if Present:    None  Evidence of Heterozygous Familial Hypercholesterolemia (FH): No (If yes, how was diagnosis made)  ACC/AHA Indication for Statin Therapy, wagner all that apply:   Established ASCVD: Indication for High intensity statin    Calculated Risk for ASCVD, if applicable:  N/A  Other Significant Risk Markers, if any, wagner all that apply:  Other: elevated LipoA  National Lipid Association (NLA) Goal (if applicable):  LDL-C:   <70 mg/dLLifestyle Recommendations From Today’s Visit:   Eating Plan: Concentrate on  DASH/Med Style diet  Exercise: Increase as tolerated  Statin Recommendations from Today's Visit  Continue Atorvastatin 80 mg once daily  Non-Statin Medications Recommendations from Today’s Visit:   Continue Praluent 75 mg once every 14 days  Indication for PCSK9 Inhibitor, if applicable:  ASCVD with suboptimal control of LDL-C despite maximally tolerated statin  Supplements Recommended at this visit:  None  Recommendations for Other Cardiovascular Risk Factors, wagner all that apply:   N/a  Other Issues:  · Pt presents to clinic doing well. He is very grateful for our services and is feeling relieved knowing his cholesterol is well controlled.  · Pt's lipid panel resulted and is largely unchanged since last panel back in 10/2021. His LDL remains  at goal despite DC of ezetimibe.  · Pt states his Praluent is affordable ($4/month). Sending in 3 month supply for pt.    Studies Ordered at Todays Visit:  None   Blood Work Ordered At Today’s visit:   Lipid panel + CMP  Follow-Up:   6 months    Tirso Grijalva, PharmD, BCACP    CC:  Matteo Berrios P.A.-C.  No ref. provider found

## 2022-02-02 PROCEDURE — RXMED WILLOW AMBULATORY MEDICATION CHARGE: Performed by: INTERNAL MEDICINE

## 2022-02-09 ENCOUNTER — PHARMACY VISIT (OUTPATIENT)
Dept: PHARMACY | Facility: MEDICAL CENTER | Age: 62
End: 2022-02-09
Payer: MEDICARE

## 2022-02-18 ENCOUNTER — OFFICE VISIT (OUTPATIENT)
Dept: CARDIOLOGY | Facility: MEDICAL CENTER | Age: 62
End: 2022-02-18
Payer: MEDICARE

## 2022-02-18 VITALS
RESPIRATION RATE: 14 BRPM | HEIGHT: 73 IN | SYSTOLIC BLOOD PRESSURE: 110 MMHG | OXYGEN SATURATION: 97 % | WEIGHT: 209.8 LBS | HEART RATE: 67 BPM | DIASTOLIC BLOOD PRESSURE: 82 MMHG | BODY MASS INDEX: 27.8 KG/M2

## 2022-02-18 DIAGNOSIS — E78.2 MIXED HYPERLIPIDEMIA: ICD-10-CM

## 2022-02-18 DIAGNOSIS — Z95.1 HX OF CABG: ICD-10-CM

## 2022-02-18 DIAGNOSIS — Z79.899 HIGH RISK MEDICATION USE: ICD-10-CM

## 2022-02-18 DIAGNOSIS — I25.5 ISCHEMIC CARDIOMYOPATHY: ICD-10-CM

## 2022-02-18 DIAGNOSIS — E78.41 ELEVATED LIPOPROTEIN(A): ICD-10-CM

## 2022-02-18 DIAGNOSIS — I24.9 ACS (ACUTE CORONARY SYNDROME) (HCC): ICD-10-CM

## 2022-02-18 DIAGNOSIS — R06.02 SOB (SHORTNESS OF BREATH): ICD-10-CM

## 2022-02-18 DIAGNOSIS — I25.10 CORONARY ARTERY DISEASE INVOLVING NATIVE CORONARY ARTERY OF NATIVE HEART WITHOUT ANGINA PECTORIS: ICD-10-CM

## 2022-02-18 PROCEDURE — 99214 OFFICE O/P EST MOD 30 MIN: CPT | Performed by: INTERNAL MEDICINE

## 2022-02-18 RX ORDER — ATORVASTATIN CALCIUM 80 MG/1
80 TABLET, FILM COATED ORAL EVERY MORNING
Qty: 90 TABLET | Refills: 3 | Status: SHIPPED | OUTPATIENT
Start: 2022-02-18 | End: 2022-08-19 | Stop reason: SDUPTHER

## 2022-02-18 RX ORDER — ENALAPRIL MALEATE 5 MG/1
5 TABLET ORAL DAILY
Qty: 90 TABLET | Refills: 11 | Status: SHIPPED | OUTPATIENT
Start: 2022-02-18 | End: 2022-08-19 | Stop reason: SDUPTHER

## 2022-02-18 ASSESSMENT — ENCOUNTER SYMPTOMS
SORE THROAT: 0
CONSTITUTIONAL NEGATIVE: 1
MUSCULOSKELETAL NEGATIVE: 1
CLAUDICATION: 0
FEVER: 0
EYES NEGATIVE: 1
PND: 0
ORTHOPNEA: 0
DIZZINESS: 0
BRUISES/BLEEDS EASILY: 0
CARDIOVASCULAR NEGATIVE: 1
PALPITATIONS: 0
STRIDOR: 0
SPUTUM PRODUCTION: 0
LOSS OF CONSCIOUSNESS: 0
COUGH: 0
SHORTNESS OF BREATH: 0
WHEEZING: 0
WEAKNESS: 0
NEUROLOGICAL NEGATIVE: 1
CHILLS: 0
HEMOPTYSIS: 0
GASTROINTESTINAL NEGATIVE: 1
RESPIRATORY NEGATIVE: 1

## 2022-02-18 NOTE — PROGRESS NOTES
Chief Complaint   Patient presents with   • Coronary Artery Disease     F/V Dx: Coronary artery disease involving native coronary artery of native heart without angina pectoris       Subjective:   Anthony Reyes is a 60 y.o. male who presents today as a follow-up from his CAD status post CABG hypertension hyperlipidemia.  He had no risk factors prior to his bypass surgery.    Since he was last seen we sent him to the pharmacotherapy program.  He got started on Praluent.  His most recent lipids look very well controlled.  His lipoprotein a continues to be elevated.  He is interested in a clinical trial upcoming for that issue.  Otherwise he is doing well.      Past Medical History:   Diagnosis Date   • CAD (coronary artery disease) 02/03/2017    CABGx3     Past Surgical History:   Procedure Laterality Date   • MULTIPLE CORONARY ARTERY BYPASS ENDO VEIN HARVEST  2/3/2018    Procedure: MULTIPLE CORONARY ARTERY BYPASS X3, ENDO VEIN HARVEST RIGHT LOWER LEG;  Surgeon: Fredrick Burger M.D.;  Location: SURGERY [facility];  Service: Cardiothoracic   • PB  2/3/2018    Procedure: PB;  Surgeon: Fredrick Burger M.D.;  Location: SURGERY [facility];  Service: Cardiothoracic   • ORIF, ANKLE Right      Family History   Problem Relation Age of Onset   • Heart Disease Maternal Uncle 54        CABG     Social History     Socioeconomic History   • Marital status:      Spouse name: Not on file   • Number of children: Not on file   • Years of education: Not on file   • Highest education level: Not on file   Occupational History   • Not on file   Tobacco Use   • Smoking status: Never Smoker   • Smokeless tobacco: Former User   • Tobacco comment: Chewed for 5 years   Vaping Use   • Vaping Use: Never used   Substance and Sexual Activity   • Alcohol use: No   • Drug use: No   • Sexual activity: Not on file   Other Topics Concern   • Not on file   Social History Narrative   • Not on file     Social Determinants of  Health     Financial Resource Strain: Not on file   Food Insecurity: Not on file   Transportation Needs: Not on file   Physical Activity: Not on file   Stress: Not on file   Social Connections: Not on file   Intimate Partner Violence: Not on file   Housing Stability: Not on file     No Known Allergies  Outpatient Encounter Medications as of 2/18/2022   Medication Sig Dispense Refill   • atorvastatin (LIPITOR) 80 MG tablet Take 1 Tablet by mouth every morning. 90 Tablet 3   • enalapril (VASOTEC) 5 MG Tab Take 1 Tablet by mouth every day. 90 Tablet 11   • ZINC OXIDE PO Take  by mouth.     • Ascorbic Acid (VITAMIN C) 1000 MG Tab Take  by mouth.     • Omega-3 Fatty Acids (FISH OIL) 1000 MG Cap capsule Take 1,000 mg by mouth every day.     • Alirocumab 75 MG/ML Solution Auto-injector Inject 75 mg (1 pen) under the skin every 14 days. 6 mL 3   • omeprazole (PRILOSEC) 40 MG delayed-release capsule Take 40 mg by mouth every evening.     • aspirin EC 81 MG EC tablet Take 1 Tab by mouth every day. 30 Tab    • [DISCONTINUED] atorvastatin (LIPITOR) 80 MG tablet Take 1 tablet by mouth every morning. 90 tablet 3   • [DISCONTINUED] enalapril (VASOTEC) 5 MG Tab Take 1 Tab by mouth every day. 90 Tab 11     No facility-administered encounter medications on file as of 2/18/2022.     Review of Systems   Constitutional: Negative.  Negative for chills, fever and malaise/fatigue.   HENT: Negative.  Negative for sore throat.    Eyes: Negative.    Respiratory: Negative.  Negative for cough, hemoptysis, sputum production, shortness of breath, wheezing and stridor.    Cardiovascular: Negative.  Negative for chest pain, palpitations, orthopnea, claudication, leg swelling and PND.   Gastrointestinal: Negative.    Genitourinary: Negative.    Musculoskeletal: Negative.    Skin: Negative.    Neurological: Negative.  Negative for dizziness, loss of consciousness and weakness.   Endo/Heme/Allergies: Negative.  Does not bruise/bleed easily.   All  "other systems reviewed and are negative.       Objective:   /82 (BP Location: Right arm, Patient Position: Sitting, BP Cuff Size: Adult)   Pulse 67   Resp 14   Ht 1.854 m (6' 1\")   Wt 95.2 kg (209 lb 12.8 oz)   SpO2 97%   BMI 27.68 kg/m²     Physical Exam  Vitals and nursing note reviewed.   Constitutional:       General: He is not in acute distress.     Appearance: He is well-developed. He is not diaphoretic.   HENT:      Head: Normocephalic and atraumatic.      Right Ear: External ear normal.      Left Ear: External ear normal.      Nose: Nose normal.      Mouth/Throat:      Pharynx: No oropharyngeal exudate.   Eyes:      General: No scleral icterus.        Right eye: No discharge.         Left eye: No discharge.      Conjunctiva/sclera: Conjunctivae normal.      Pupils: Pupils are equal, round, and reactive to light.   Neck:      Vascular: No JVD.   Cardiovascular:      Rate and Rhythm: Normal rate and regular rhythm.      Heart sounds: No murmur heard.    No friction rub. No gallop.   Pulmonary:      Effort: Pulmonary effort is normal. No respiratory distress.      Breath sounds: No stridor. No wheezing or rales.   Chest:      Chest wall: No tenderness.   Abdominal:      General: There is no distension.      Palpations: Abdomen is soft.      Tenderness: There is no guarding.   Musculoskeletal:         General: No tenderness or deformity. Normal range of motion.      Cervical back: Neck supple.   Skin:     General: Skin is warm and dry.      Coloration: Skin is not pale.      Findings: No erythema or rash.   Neurological:      Mental Status: He is alert.      Cranial Nerves: No cranial nerve deficit.      Motor: No abnormal muscle tone.      Coordination: Coordination normal.      Deep Tendon Reflexes: Reflexes are normal and symmetric. Reflexes normal.   Psychiatric:         Behavior: Behavior normal.         Thought Content: Thought content normal.         Judgment: Judgment normal. "     Echocardiogram: Dated 7/28/2018 personally viewed interpreted myself showing an EF of 65% otherwise normal.    Cardiac catheterization: Dated 2/2/2018 personally viewed interpreted myself showing:  POSTPROCEDURE DIAGNOSES:  1.  Coronary artery disease, 2-vessel with complex proximal left anterior descending  artery serial 95% and 90% stenoses including bifurcation disease involving the diagonal  branch and the distal circumflex artery.  2.  Left ventricular ejection fraction 41% with severe hypokinesis and akinesis of the  anterior wall and anterior apical walls.     Lab Results   Component Value Date/Time    CHOLSTRLTOT 97 (L) 01/14/2022 10:15 AM    LDL 40 01/14/2022 10:15 AM    HDL 48 01/14/2022 10:15 AM    TRIGLYCERIDE 45 01/14/2022 10:15 AM       Lab Results   Component Value Date/Time    SODIUM 141 01/14/2022 10:15 AM    POTASSIUM 4.3 01/14/2022 10:15 AM    CHLORIDE 110 01/14/2022 10:15 AM    CO2 20 01/14/2022 10:15 AM    GLUCOSE 117 (H) 01/14/2022 10:15 AM    BUN 20 01/14/2022 10:15 AM    CREATININE 0.87 01/14/2022 10:15 AM     Lab Results   Component Value Date/Time    ALKPHOSPHAT 95 01/14/2022 10:15 AM    ASTSGOT 16 01/14/2022 10:15 AM    ALTSGPT 22 01/14/2022 10:15 AM    TBILIRUBIN 0.5 01/14/2022 10:15 AM        Assessment:     1. Elevated lipoprotein(a)     2. SOB (shortness of breath)  enalapril (VASOTEC) 5 MG Tab   3. High risk medication use  enalapril (VASOTEC) 5 MG Tab   4. Ischemic cardiomyopathy  atorvastatin (LIPITOR) 80 MG tablet    enalapril (VASOTEC) 5 MG Tab   5. Mixed hyperlipidemia  atorvastatin (LIPITOR) 80 MG tablet    enalapril (VASOTEC) 5 MG Tab   6. Hx of CABG  atorvastatin (LIPITOR) 80 MG tablet   7. Coronary artery disease involving native coronary artery of native heart without angina pectoris  enalapril (VASOTEC) 5 MG Tab   8. ACS (acute coronary syndrome) (HCC)         Medical Decision Making:  Today's Assessment / Status / Plan:     61-year-old male with a history of CAD status  post CABG. I refilled his medications.  I will not make any changes today.  Very happy with how his lipids are doing.  We will get him randomized clinical trial once were approved.  Otherwise I will see him back in 6 months.

## 2022-02-25 NOTE — THERAPY
"Pt is 57 y.o. male presenting to outside hospital with chest pain, found to have elevated troponins and transferred to [facility] for higher level of care. Cardiac catheterization perform found EF of 41% and occlusions of 90% at bifurcation of L and 91% L proximal; now POD#2 of CABG x3 to LAD, diagonal, and L circumflex arteries. At time of evaluation pt with significant sternal pain and R LE pain. Ambulating 2 x 110ft w/ CGA w/ increasing gait distance pt with increasing medial lateral sway. Pt with difficulty self monitoring exertion therefore utilizing talk test. Provided pt with handout concern what to expect after open heart surgery. Pt with extremely high prior level of function and will require further rehabilitation to appropriately self monitor, develop walking program, negotiate stairs, and transitional movement. Dependent on progress may requiring further inpatient rehabilitation prior to return home with Phase 2 outpatient cardiac rehab. Will continue to progress in house 3x/wk.     Physical Therapy Evaluation completed.   Transfers: Sit to Stand: Contact Guard Assist  Gait: Level Of Assist: Contact Guard Assist with No Equipment Needed       Plan of Care: Will benefit from Physical Therapy 3 times per week  Discharge Recommendations: Equipment: Will Continue to Assess for Equipment Needs. Post-acute therapy Discharge to a transitional care facility for continued skilled therapy services or Discharge to home with outpatient or home health for additional skilled therapy services. Dependent on stair negotiation and self monitoring of cardiac exertion.     Dodie Montenegro PT, [phone]    See \"Rehab Therapy-Acute\" Patient Summary Report for complete documentation.     " - S/p ICD

## 2022-05-19 ENCOUNTER — TELEPHONE (OUTPATIENT)
Dept: CARDIOLOGY | Facility: MEDICAL CENTER | Age: 62
End: 2022-05-19
Payer: MEDICARE

## 2022-05-19 ENCOUNTER — PHARMACY VISIT (OUTPATIENT)
Dept: PHARMACY | Facility: MEDICAL CENTER | Age: 62
End: 2022-05-19
Payer: MEDICARE

## 2022-05-19 PROCEDURE — RXMED WILLOW AMBULATORY MEDICATION CHARGE: Performed by: INTERNAL MEDICINE

## 2022-05-19 NOTE — TELEPHONE ENCOUNTER
Lm for patient to call back and reschedule Dr. Clarke appt for 08/19 appt due to a change in schedule.

## 2022-07-11 ENCOUNTER — HOSPITAL ENCOUNTER (OUTPATIENT)
Dept: LAB | Facility: MEDICAL CENTER | Age: 62
End: 2022-07-11
Attending: INTERNAL MEDICINE
Payer: MEDICARE

## 2022-07-11 DIAGNOSIS — I25.10 CORONARY ARTERY DISEASE INVOLVING NATIVE CORONARY ARTERY OF NATIVE HEART WITHOUT ANGINA PECTORIS: ICD-10-CM

## 2022-07-11 DIAGNOSIS — E78.2 MIXED HYPERLIPIDEMIA: ICD-10-CM

## 2022-07-11 DIAGNOSIS — E78.41 ELEVATED LIPOPROTEIN(A): ICD-10-CM

## 2022-07-11 DIAGNOSIS — I24.9 ACS (ACUTE CORONARY SYNDROME) (HCC): ICD-10-CM

## 2022-07-11 LAB
ALBUMIN SERPL BCP-MCNC: 4.3 G/DL (ref 3.2–4.9)
ALBUMIN/GLOB SERPL: 2.2 G/DL
ALP SERPL-CCNC: 80 U/L (ref 30–99)
ALT SERPL-CCNC: 14 U/L (ref 2–50)
ANION GAP SERPL CALC-SCNC: 12 MMOL/L (ref 7–16)
AST SERPL-CCNC: 13 U/L (ref 12–45)
BILIRUB SERPL-MCNC: 0.5 MG/DL (ref 0.1–1.5)
BUN SERPL-MCNC: 23 MG/DL (ref 8–22)
CALCIUM SERPL-MCNC: 8.9 MG/DL (ref 8.5–10.5)
CHLORIDE SERPL-SCNC: 109 MMOL/L (ref 96–112)
CHOLEST SERPL-MCNC: 101 MG/DL (ref 100–199)
CO2 SERPL-SCNC: 20 MMOL/L (ref 20–33)
CREAT SERPL-MCNC: 0.89 MG/DL (ref 0.5–1.4)
FASTING STATUS PATIENT QL REPORTED: NORMAL
GFR SERPLBLD CREATININE-BSD FMLA CKD-EPI: 97 ML/MIN/1.73 M 2
GLOBULIN SER CALC-MCNC: 2 G/DL (ref 1.9–3.5)
GLUCOSE SERPL-MCNC: 121 MG/DL (ref 65–99)
HDLC SERPL-MCNC: 53 MG/DL
LDLC SERPL CALC-MCNC: 40 MG/DL
POTASSIUM SERPL-SCNC: 4.1 MMOL/L (ref 3.6–5.5)
PROT SERPL-MCNC: 6.3 G/DL (ref 6–8.2)
SODIUM SERPL-SCNC: 141 MMOL/L (ref 135–145)
TRIGL SERPL-MCNC: 42 MG/DL (ref 0–149)

## 2022-07-11 PROCEDURE — 36415 COLL VENOUS BLD VENIPUNCTURE: CPT

## 2022-07-11 PROCEDURE — 80053 COMPREHEN METABOLIC PANEL: CPT

## 2022-07-11 PROCEDURE — 80061 LIPID PANEL: CPT

## 2022-07-18 ENCOUNTER — NON-PROVIDER VISIT (OUTPATIENT)
Dept: CARDIOLOGY | Facility: MEDICAL CENTER | Age: 62
End: 2022-07-18
Payer: MEDICARE

## 2022-07-18 ENCOUNTER — RESEARCH ENCOUNTER (OUTPATIENT)
Dept: CARDIOLOGY | Facility: MEDICAL CENTER | Age: 62
End: 2022-07-18

## 2022-07-18 VITALS
TEMPERATURE: 59 F | WEIGHT: 200 LBS | BODY MASS INDEX: 26.39 KG/M2 | SYSTOLIC BLOOD PRESSURE: 119 MMHG | DIASTOLIC BLOOD PRESSURE: 72 MMHG | HEART RATE: 58 BPM

## 2022-07-18 DIAGNOSIS — R73.01 IMPAIRED FASTING BLOOD SUGAR: ICD-10-CM

## 2022-07-18 DIAGNOSIS — E78.2 MIXED HYPERLIPIDEMIA: ICD-10-CM

## 2022-07-18 PROCEDURE — 99211 OFF/OP EST MAY X REQ PHY/QHP: CPT | Performed by: NURSE PRACTITIONER

## 2022-07-18 NOTE — PROGRESS NOTES
[facility] - FollowUp Visit  Date of Service: 07/18/22    Anthony Reyes is here for follow up of dyslipidemia.    Subjective      HPI  Pertinent Interval History since last visit:   No changes, tolerating all of his therapies very well.   Current Prescription Lipid Lowering Medications - including dose:   Statin: Atorvastatin 80 mg daily  Non-Statin: Praluent 75 mg daily  Current Lipid Lowering and Related Supplements:   OTC fish oil 1000 mg BID  Any Current Side Effects Potentially Related to Lipid Lowering therapy?   No  Current Adherence to Lipid Lowering Therapies:  Complete  Any Previous History of Statin Intolerance?   No  Baseline Lipids Prior to Treatment:      Ref. Range 2/2/2018 23:09   Cholesterol,Tot Latest Ref Range: 100 - 199 mg/dL 157   Triglycerides Latest Ref Range: 0 - 149 mg/dL 99   HDL Latest Ref Range: >=40 mg/dL 36 (A)   LDL Latest Ref Range: <100 mg/dL 101 (H)            SOCIAL HISTORY  Social History     Tobacco Use   Smoking Status Never Smoker   Smokeless Tobacco Former User   • Quit date: 1980   Tobacco Comment    Chewed for 5 years      Change in weight: Stable  Exercise habits: walking 2 miles/day, rarely misses days/   Diet: Focus on eating chicken and salad.  Reports avoiding soda but does drink a lot of gatorade.       Objective    Vitals:    07/18/22 0842 07/18/22 0903   BP: 121/84 119/72   Pulse: (!) 58    Temp:  (!) 15 °C (59 °F)   Weight: 90.7 kg (200 lb)       Physical Exam    DATA REVIEW  Most Recent Lipid Panel:   Lab Results   Component Value Date    CHOLSTRLTOT 101 07/11/2022    TRIGLYCERIDE 42 07/11/2022    HDL 53 07/11/2022    LDL 40 07/11/2022       Other Pertinent Blood Work:   Lab Results   Component Value Date    SODIUM 141 07/11/2022    POTASSIUM 4.1 07/11/2022    CHLORIDE 109 07/11/2022    CO2 20 07/11/2022    ANION 12.0 07/11/2022    GLUCOSE 121 (H) 07/11/2022    BUN 23 (H) 07/11/2022    CREATININE 0.89 07/11/2022    CALCIUM 8.9 07/11/2022    ASTSGOT  13 07/11/2022    ALTSGPT 14 07/11/2022    ALKPHOSPHAT 80 07/11/2022    TBILIRUBIN 0.5 07/11/2022    ALBUMIN 4.3 07/11/2022    AGRATIO 2.2 07/11/2022    LIPOPROTA 131 (H) 01/29/2021       Other:  NA    Recent Imaging Studies:    None since last visit          ASSESSMENT AND PLAN  Patient Type, check all that apply:   Secondary Prevention  Established Atherosclerotic Cardiovascular Disease (ASCVD)  Yes, Details: hx of MI + CABG in 2019  Other Established (non-atherosclerotic) Vascular Disease, if Present:    None  Evidence of Heterozygous Familial Hypercholesterolemia (FH): No (If yes, how was diagnosis made)  ACC/AHA Indication for Statin Therapy, wagner all that apply:   Established ASCVD: Indication for High intensity statin    Calculated Risk for ASCVD, if applicable:  N/A  Other Significant Risk Markers, if any, wagner all that apply:  Other: elevated LipoA  National Lipid Association (NLA) Goal (if applicable):  LDL-C:   <70 mg/dL  Lifestyle Recommendations From Today’s Visit:   Continue daily walking  Reduce gatorade and sugar drinks from diet  Statin Recommendations from Today's Visit  Continue Atorvastatin 80 mg daily.   Non-Statin Medications Recommendations from Today’s Visit:   None  Indication for PCSK9 Inhibitor, if applicable:  ASCVD with suboptimal control of LDL-C despite maximally tolerated statin  Supplements Recommended at this visit:  Continue fish oil if pt would like to.  However, it's not indicated.  Pt prefers to keep regimen the same.   Recommendations for Other Cardiovascular Risk Factors, wagner all that apply:   Diabetes/Impaired Fasting Glucose- Ordered A1c to confirm if elevated BG should be addressed. Pt reports drinking a lot of sugary drinks, he has committed to stopping this practice.   Other Issues:  Address BG at next visit if A1c is above 6.5    Studies Ordered at Todays Visit:  None   Blood Work Ordered At Today’s visit:   A1c, CMP,Lipid panel  Follow-Up:   5 months per pt  availability.     Andrew Scott, PharmD    CC:    Dr Rowan Dr Bloch

## 2022-07-26 PROCEDURE — RXMED WILLOW AMBULATORY MEDICATION CHARGE: Performed by: INTERNAL MEDICINE

## 2022-07-27 ENCOUNTER — PHARMACY VISIT (OUTPATIENT)
Dept: PHARMACY | Facility: MEDICAL CENTER | Age: 62
End: 2022-07-27
Payer: MEDICARE

## 2022-08-01 NOTE — PROGRESS NOTES
Pt arrives to ED with increased pain and drainage from a tissue removal on 7/7. Pt states this has been ongoing for one week. Pt states about 90 min ago she began having N&T in L forearm and weakness in L hand.        Monitor summary: pt has been SR.  HR: 70s-80s  Measurements: (12/08/40)  Ectopy: occasional PVC

## 2022-08-19 ENCOUNTER — OFFICE VISIT (OUTPATIENT)
Dept: CARDIOLOGY | Facility: MEDICAL CENTER | Age: 62
End: 2022-08-19
Payer: MEDICARE

## 2022-08-19 VITALS
SYSTOLIC BLOOD PRESSURE: 118 MMHG | HEART RATE: 58 BPM | WEIGHT: 202.4 LBS | DIASTOLIC BLOOD PRESSURE: 78 MMHG | OXYGEN SATURATION: 98 % | BODY MASS INDEX: 26.83 KG/M2 | RESPIRATION RATE: 16 BRPM | HEIGHT: 73 IN

## 2022-08-19 DIAGNOSIS — I24.9 ACS (ACUTE CORONARY SYNDROME) (HCC): ICD-10-CM

## 2022-08-19 DIAGNOSIS — E78.2 MIXED HYPERLIPIDEMIA: ICD-10-CM

## 2022-08-19 DIAGNOSIS — I25.10 CORONARY ARTERY DISEASE INVOLVING NATIVE CORONARY ARTERY OF NATIVE HEART WITHOUT ANGINA PECTORIS: ICD-10-CM

## 2022-08-19 DIAGNOSIS — E78.41 ELEVATED LIPOPROTEIN(A): ICD-10-CM

## 2022-08-19 DIAGNOSIS — Z95.1 HX OF CABG: ICD-10-CM

## 2022-08-19 DIAGNOSIS — Z79.899 HIGH RISK MEDICATION USE: ICD-10-CM

## 2022-08-19 DIAGNOSIS — R06.02 SOB (SHORTNESS OF BREATH): ICD-10-CM

## 2022-08-19 DIAGNOSIS — I25.5 ISCHEMIC CARDIOMYOPATHY: ICD-10-CM

## 2022-08-19 PROCEDURE — 99214 OFFICE O/P EST MOD 30 MIN: CPT | Performed by: INTERNAL MEDICINE

## 2022-08-19 RX ORDER — ENALAPRIL MALEATE 5 MG/1
5 TABLET ORAL DAILY
Qty: 90 TABLET | Refills: 11 | Status: SHIPPED | OUTPATIENT
Start: 2022-08-19 | End: 2023-11-16 | Stop reason: SDUPTHER

## 2022-08-19 RX ORDER — ATORVASTATIN CALCIUM 80 MG/1
80 TABLET, FILM COATED ORAL EVERY MORNING
Qty: 90 TABLET | Refills: 3 | Status: SHIPPED | OUTPATIENT
Start: 2022-08-19 | End: 2023-06-19

## 2022-08-19 ASSESSMENT — ENCOUNTER SYMPTOMS
COUGH: 0
SPUTUM PRODUCTION: 0
FEVER: 0
PND: 0
STRIDOR: 0
EYES NEGATIVE: 1
SORE THROAT: 0
RESPIRATORY NEGATIVE: 1
BRUISES/BLEEDS EASILY: 0
CARDIOVASCULAR NEGATIVE: 1
WEAKNESS: 0
HEMOPTYSIS: 0
PALPITATIONS: 0
CLAUDICATION: 0
LOSS OF CONSCIOUSNESS: 0
GASTROINTESTINAL NEGATIVE: 1
MUSCULOSKELETAL NEGATIVE: 1
DIZZINESS: 0
SHORTNESS OF BREATH: 0
ORTHOPNEA: 0
CHILLS: 0
NEUROLOGICAL NEGATIVE: 1
CONSTITUTIONAL NEGATIVE: 1
WHEEZING: 0

## 2022-08-19 NOTE — PROGRESS NOTES
Chief Complaint   Patient presents with    Cardiomyopathy (Ischemic)    Coronary Artery Disease     F/v Dx:Coronary artery disease involving native coronary artery of native heart without angina pectoris    Coronary Artery Bypass Graft (CABG)       Subjective:   Anthony Reyes is a 60 y.o. male who presents today as a follow-up from his CAD status post CABG hypertension hyperlipidemia.  He had no risk factors prior to his bypass surgery.    Since he was last seen he continues to do well.  He did consent to having his LPA levels registered.  Otherwise he has been doing well.  We are awaiting enrollment in clinical trial for his elevated lipoprotein a.  Otherwise he has had no chest pain or shortness of breath.  His blood pressure is controlled.  Most recent labs showed LDL in the 40s.      Past Medical History:   Diagnosis Date    CAD (coronary artery disease) 02/03/2017    CABGx3     Past Surgical History:   Procedure Laterality Date    MULTIPLE CORONARY ARTERY BYPASS ENDO VEIN HARVEST  2/3/2018    Procedure: MULTIPLE CORONARY ARTERY BYPASS X3, ENDO VEIN HARVEST RIGHT LOWER LEG;  Surgeon: Fredrick Burger M.D.;  Location: SURGERY [facility];  Service: Cardiothoracic    PB  2/3/2018    Procedure: PB;  Surgeon: Fredrick Burger M.D.;  Location: SURGERY [facility];  Service: Cardiothoracic    ORIF, ANKLE Right      Family History   Problem Relation Age of Onset    Heart Disease Maternal Uncle 54        CABG     Social History     Socioeconomic History    Marital status:      Spouse name: Not on file    Number of children: Not on file    Years of education: Not on file    Highest education level: Not on file   Occupational History    Not on file   Tobacco Use    Smoking status: Never    Smokeless tobacco: Former     Quit date: 1980    Tobacco comments:     Chewed for 5 years   Vaping Use    Vaping Use: Never used   Substance and Sexual Activity    Alcohol use: No    Drug use: No    Sexual  activity: Not on file   Other Topics Concern    Not on file   Social History Narrative    Not on file     Social Determinants of Health     Financial Resource Strain: Not on file   Food Insecurity: Not on file   Transportation Needs: Not on file   Physical Activity: Not on file   Stress: Not on file   Social Connections: Not on file   Intimate Partner Violence: Not on file   Housing Stability: Not on file     No Known Allergies  Outpatient Encounter Medications as of 8/19/2022   Medication Sig Dispense Refill    atorvastatin (LIPITOR) 80 MG tablet Take 1 Tablet by mouth every morning. 90 Tablet 3    enalapril (VASOTEC) 5 MG Tab Take 1 Tablet by mouth every day. 90 Tablet 11    ZINC OXIDE PO Take  by mouth.      Ascorbic Acid (VITAMIN C) 1000 MG Tab Take 1,000 mg by mouth every day.      Omega-3 Fatty Acids (FISH OIL) 1000 MG Cap capsule Take 1,000 mg by mouth every day.      Alirocumab 75 MG/ML Solution Auto-injector Inject 75 mg (1 pen) under the skin every 14 days. 6 mL 3    omeprazole (PRILOSEC) 40 MG delayed-release capsule Take 40 mg by mouth every evening.      aspirin EC 81 MG EC tablet Take 1 Tab by mouth every day. 30 Tab     [DISCONTINUED] atorvastatin (LIPITOR) 80 MG tablet Take 1 Tablet by mouth every morning. 90 Tablet 3    [DISCONTINUED] enalapril (VASOTEC) 5 MG Tab Take 1 Tablet by mouth every day. 90 Tablet 11     No facility-administered encounter medications on file as of 8/19/2022.     Review of Systems   Constitutional: Negative.  Negative for chills, fever and malaise/fatigue.   HENT: Negative.  Negative for sore throat.    Eyes: Negative.    Respiratory: Negative.  Negative for cough, hemoptysis, sputum production, shortness of breath, wheezing and stridor.    Cardiovascular: Negative.  Negative for chest pain, palpitations, orthopnea, claudication, leg swelling and PND.   Gastrointestinal: Negative.    Genitourinary: Negative.    Musculoskeletal: Negative.    Skin: Negative.    Neurological:  "Negative.  Negative for dizziness, loss of consciousness and weakness.   Endo/Heme/Allergies: Negative.  Does not bruise/bleed easily.   All other systems reviewed and are negative.     Objective:   /78 (BP Location: Left arm, Patient Position: Sitting, BP Cuff Size: Adult)   Pulse (!) 58   Resp 16   Ht 1.854 m (6' 1\")   Wt 91.8 kg (202 lb 6.4 oz)   SpO2 98%   BMI 26.70 kg/m²     Physical Exam  Vitals and nursing note reviewed.   Constitutional:       General: He is not in acute distress.     Appearance: He is well-developed. He is not diaphoretic.   HENT:      Head: Normocephalic and atraumatic.      Right Ear: External ear normal.      Left Ear: External ear normal.      Nose: Nose normal.      Mouth/Throat:      Pharynx: No oropharyngeal exudate.   Eyes:      General: No scleral icterus.        Right eye: No discharge.         Left eye: No discharge.      Conjunctiva/sclera: Conjunctivae normal.      Pupils: Pupils are equal, round, and reactive to light.   Neck:      Vascular: No JVD.   Cardiovascular:      Rate and Rhythm: Normal rate and regular rhythm.      Heart sounds: No murmur heard.    No friction rub. No gallop.   Pulmonary:      Effort: Pulmonary effort is normal. No respiratory distress.      Breath sounds: No stridor. No wheezing or rales.   Chest:      Chest wall: No tenderness.   Abdominal:      General: There is no distension.      Palpations: Abdomen is soft.      Tenderness: There is no guarding.   Musculoskeletal:         General: No tenderness or deformity. Normal range of motion.      Cervical back: Neck supple.   Skin:     General: Skin is warm and dry.      Coloration: Skin is not pale.      Findings: No erythema or rash.   Neurological:      Mental Status: He is alert.      Cranial Nerves: No cranial nerve deficit.      Motor: No abnormal muscle tone.      Coordination: Coordination normal.      Deep Tendon Reflexes: Reflexes are normal and symmetric. Reflexes normal. "   Psychiatric:         Behavior: Behavior normal.         Thought Content: Thought content normal.         Judgment: Judgment normal.   Echocardiogram: Dated 7/28/2018 personally viewed interpreted myself showing an EF of 65% otherwise normal.    Cardiac catheterization: Dated 2/2/2018 personally viewed interpreted myself showing:  POSTPROCEDURE DIAGNOSES:  1.  Coronary artery disease, 2-vessel with complex proximal left anterior descending  artery serial 95% and 90% stenoses including bifurcation disease involving the diagonal  branch and the distal circumflex artery.  2.  Left ventricular ejection fraction 41% with severe hypokinesis and akinesis of the  anterior wall and anterior apical walls.     Lab Results   Component Value Date/Time    CHOLSTRLTOT 101 07/11/2022 08:44 AM    LDL 40 07/11/2022 08:44 AM    HDL 53 07/11/2022 08:44 AM    TRIGLYCERIDE 42 07/11/2022 08:44 AM       Lab Results   Component Value Date/Time    SODIUM 141 07/11/2022 08:44 AM    POTASSIUM 4.1 07/11/2022 08:44 AM    CHLORIDE 109 07/11/2022 08:44 AM    CO2 20 07/11/2022 08:44 AM    GLUCOSE 121 (H) 07/11/2022 08:44 AM    BUN 23 (H) 07/11/2022 08:44 AM    CREATININE 0.89 07/11/2022 08:44 AM     Lab Results   Component Value Date/Time    ALKPHOSPHAT 80 07/11/2022 08:44 AM    ASTSGOT 13 07/11/2022 08:44 AM    ALTSGPT 14 07/11/2022 08:44 AM    TBILIRUBIN 0.5 07/11/2022 08:44 AM        Assessment:     1. ACS (acute coronary syndrome) (HCC)        2. Coronary artery disease involving native coronary artery of native heart without angina pectoris  enalapril (VASOTEC) 5 MG Tab      3. Hx of CABG  atorvastatin (LIPITOR) 80 MG tablet      4. Mixed hyperlipidemia  atorvastatin (LIPITOR) 80 MG tablet    enalapril (VASOTEC) 5 MG Tab      5. Ischemic cardiomyopathy  atorvastatin (LIPITOR) 80 MG tablet    enalapril (VASOTEC) 5 MG Tab      6. High risk medication use  enalapril (VASOTEC) 5 MG Tab      7. SOB (shortness of breath)  enalapril (VASOTEC) 5 MG  Tab      8. Elevated lipoprotein(a)            Medical Decision Making:  Today's Assessment / Status / Plan:     61-year-old male with a history of CAD status post CABG. I refilled his atorvastatin enalapril.  We will await the start of a clinical trial for lipoprotein a.  He is otherwise doing well and I will see him back in 6 months.

## 2022-10-24 ENCOUNTER — PHARMACY VISIT (OUTPATIENT)
Dept: PHARMACY | Facility: MEDICAL CENTER | Age: 62
End: 2022-10-24
Payer: MEDICARE

## 2022-10-24 PROCEDURE — RXMED WILLOW AMBULATORY MEDICATION CHARGE: Performed by: INTERNAL MEDICINE

## 2022-12-19 ENCOUNTER — NON-PROVIDER VISIT (OUTPATIENT)
Dept: CARDIOLOGY | Facility: MEDICAL CENTER | Age: 62
End: 2022-12-19
Payer: MEDICARE

## 2022-12-19 ENCOUNTER — HOSPITAL ENCOUNTER (OUTPATIENT)
Dept: LAB | Facility: MEDICAL CENTER | Age: 62
End: 2022-12-19
Attending: INTERNAL MEDICINE
Payer: MEDICARE

## 2022-12-19 VITALS — BODY MASS INDEX: 27.71 KG/M2 | WEIGHT: 210 LBS

## 2022-12-19 DIAGNOSIS — R73.01 IFG (IMPAIRED FASTING GLUCOSE): ICD-10-CM

## 2022-12-19 DIAGNOSIS — I24.9 ACS (ACUTE CORONARY SYNDROME) (HCC): ICD-10-CM

## 2022-12-19 DIAGNOSIS — I25.10 CORONARY ARTERY DISEASE INVOLVING NATIVE CORONARY ARTERY OF NATIVE HEART WITHOUT ANGINA PECTORIS: ICD-10-CM

## 2022-12-19 DIAGNOSIS — E78.41 ELEVATED LIPOPROTEIN(A): ICD-10-CM

## 2022-12-19 DIAGNOSIS — E78.2 MIXED HYPERLIPIDEMIA: ICD-10-CM

## 2022-12-19 LAB
ALBUMIN SERPL BCP-MCNC: 4.5 G/DL (ref 3.2–4.9)
ALBUMIN/GLOB SERPL: 2.1 G/DL
ALP SERPL-CCNC: 87 U/L (ref 30–99)
ALT SERPL-CCNC: 20 U/L (ref 2–50)
ANION GAP SERPL CALC-SCNC: 11 MMOL/L (ref 7–16)
AST SERPL-CCNC: 16 U/L (ref 12–45)
BILIRUB SERPL-MCNC: 0.3 MG/DL (ref 0.1–1.5)
BUN SERPL-MCNC: 21 MG/DL (ref 8–22)
CALCIUM ALBUM COR SERPL-MCNC: 8.8 MG/DL (ref 8.5–10.5)
CALCIUM SERPL-MCNC: 9.2 MG/DL (ref 8.5–10.5)
CHLORIDE SERPL-SCNC: 104 MMOL/L (ref 96–112)
CHOLEST SERPL-MCNC: 112 MG/DL (ref 100–199)
CO2 SERPL-SCNC: 25 MMOL/L (ref 20–33)
CREAT SERPL-MCNC: 0.78 MG/DL (ref 0.5–1.4)
EST. AVERAGE GLUCOSE BLD GHB EST-MCNC: 117 MG/DL
GFR SERPLBLD CREATININE-BSD FMLA CKD-EPI: 101 ML/MIN/1.73 M 2
GLOBULIN SER CALC-MCNC: 2.1 G/DL (ref 1.9–3.5)
GLUCOSE SERPL-MCNC: 89 MG/DL (ref 65–99)
HBA1C MFR BLD: 5.7 % (ref 4–5.6)
HDLC SERPL-MCNC: 61 MG/DL
LDLC SERPL CALC-MCNC: 32 MG/DL
POTASSIUM SERPL-SCNC: 4.2 MMOL/L (ref 3.6–5.5)
PROT SERPL-MCNC: 6.6 G/DL (ref 6–8.2)
SODIUM SERPL-SCNC: 140 MMOL/L (ref 135–145)
TRIGL SERPL-MCNC: 93 MG/DL (ref 0–149)

## 2022-12-19 PROCEDURE — 99211 OFF/OP EST MAY X REQ PHY/QHP: CPT | Performed by: INTERNAL MEDICINE

## 2022-12-19 PROCEDURE — 83036 HEMOGLOBIN GLYCOSYLATED A1C: CPT | Mod: GA

## 2022-12-19 PROCEDURE — 80053 COMPREHEN METABOLIC PANEL: CPT

## 2022-12-19 PROCEDURE — 80061 LIPID PANEL: CPT

## 2022-12-19 PROCEDURE — 36415 COLL VENOUS BLD VENIPUNCTURE: CPT | Mod: GA

## 2022-12-19 NOTE — PROGRESS NOTES
[facility] - FollowUp Visit  Date of Service: 12/19/22    Anthony Reyes is here for follow up of dyslipidemia.    Subjective    HPI  Pertinent Interval History since last visit:   Since pt's last appt, he was enrolled in a lipoA clinical trial.  Current Prescription Lipid Lowering Medications - including dose:   Statin: Atorvastatin 80 mg once daily  Non-Statin: Praluent 75 mg subQ once every 14 days  Current Lipid Lowering and Related Supplements:   OTC fish oil  Any Current Side Effects Potentially Related to Lipid Lowering therapy?   No  Current Adherence to Lipid Lowering Therapies:  Complete  Any Previous History of Statin Intolerance?   No  Baseline Lipids Prior to Treatment:       Ref. Range 2/2/2018 23:09   Cholesterol,Tot Latest Ref Range: 100 - 199 mg/dL 157   Triglycerides Latest Ref Range: 0 - 149 mg/dL 99   HDL Latest Ref Range: >=40 mg/dL 36 (A)   LDL Latest Ref Range: <100 mg/dL 101 (H)       SOCIAL HISTORY  Social History     Tobacco Use   Smoking Status Never   Smokeless Tobacco Former   • Quit date: 1980   Tobacco Comments    Chewed for 5 years      Change in weight:  Pt is up ~ 8 lbs since last visit.  (Pt attributes to winter wear)  Exercise habits: no regular exercise program d/t the weather being too cold.   Diet: Focus on eating chicken and salad.  Reports avoiding soda & Gatorade. Pt states he eats 1-2 ice cream bars per week.       Objective    Vitals:    12/19/22 0814   Weight: 95.3 kg (210 lb)      Physical Exam    DATA REVIEW  Most Recent Lipid Panel:   Lab Results   Component Value Date    CHOLSTRLTOT 101 07/11/2022    TRIGLYCERIDE 42 07/11/2022    HDL 53 07/11/2022    LDL 40 07/11/2022       Other Pertinent Blood Work:   Lab Results   Component Value Date    SODIUM 141 07/11/2022    POTASSIUM 4.1 07/11/2022    CHLORIDE 109 07/11/2022    CO2 20 07/11/2022    ANION 12.0 07/11/2022    GLUCOSE 121 (H) 07/11/2022    BUN 23 (H) 07/11/2022    CREATININE 0.89 07/11/2022     CALCIUM 8.9 07/11/2022    ASTSGOT 13 07/11/2022    ALTSGPT 14 07/11/2022    ALKPHOSPHAT 80 07/11/2022    TBILIRUBIN 0.5 07/11/2022    ALBUMIN 4.3 07/11/2022    AGRATIO 2.2 07/11/2022    LIPOPROTA 131 (H) 01/29/2021       Other:  NA    Recent Imaging Studies:    None since last visit        ASSESSMENT AND PLAN  Patient Type, check all that apply:   Secondary Prevention  Established Atherosclerotic Cardiovascular Disease (ASCVD)  Yes, Details: hx of MI + CABG in 2019  Other Established (non-atherosclerotic) Vascular Disease, if Present:    None  Evidence of Heterozygous Familial Hypercholesterolemia (FH): No   ACC/AHA Indication for Statin Therapy, wagner all that apply:   Established ASCVD: Indication for High intensity statin    Calculated Risk for ASCVD, if applicable:  N/A  Other Significant Risk Markers, if any, wagner all that apply:  Other: elevated LipoA  National Lipid Association (NLA) Goal (if applicable):  LDL-C:   <70 mg/dL  Lifestyle Recommendations From Today’s Visit:   Eating Plan: Concentrate on  Low sat/Trans fat, Low simple carb, and DASH/Med Style diet, Exercise: Increase as tolerated, and Weight Management: Work to decrease  Statin Recommendations from Today's Visit  Continue atorvastatin 80 mg once daily  Non-Statin Medications Recommendations from Today’s Visit:   Continue Praluent 75 mg once every 14 days  Indication for PCSK9 Inhibitor, if applicable:  ASCVD with suboptimal control of LDL-C despite maximally tolerated statin  Supplements Recommended at this visit:  None  Recommendations for Other Cardiovascular Risk Factors, wagner all that apply:   DM/IFG - will f/u on A1c result.  Other Issues:  Pt reports to clinic doing well today. He reports no SE or affordability issues w/ his medications.  Pt did not obtain labs prior to our appt. He reports he will go today - will f/u via telephone.  Suspect that based on his trended labs that his lipids will remain w/in goal.    Studies Ordered at Todays  Visit:  None   Blood Work Ordered At Today’s visit:   CMP, A1c, lipids  Follow-Up:   2 days via telephone, 6 months in clinic    Tirso Grijalva, PharmD, BCACP    CC:  Matteo Berrios P.A.-C.  No ref. provider found

## 2022-12-21 NOTE — PROGRESS NOTES
Pt's labs resulted - Everything was WNL/at goal aside from his A1c being in pre-DM range. Counseled pt regarding this and encouraged carb/sugar moderation.    Tirso Grijalva, TeresitaD, BCACP

## 2022-12-30 ENCOUNTER — PHARMACY VISIT (OUTPATIENT)
Dept: PHARMACY | Facility: MEDICAL CENTER | Age: 62
End: 2022-12-30
Payer: MEDICARE

## 2022-12-30 PROCEDURE — RXMED WILLOW AMBULATORY MEDICATION CHARGE: Performed by: INTERNAL MEDICINE

## 2023-01-16 ENCOUNTER — APPOINTMENT (OUTPATIENT)
Dept: CARDIOLOGY | Facility: MEDICAL CENTER | Age: 63
End: 2023-01-16
Payer: MEDICARE

## 2023-03-05 ENCOUNTER — DOCUMENTATION (OUTPATIENT)
Dept: PHARMACY | Facility: MEDICAL CENTER | Age: 63
End: 2023-03-05
Payer: MEDICARE

## 2023-03-05 NOTE — PROGRESS NOTES
[facility] gap list patient first filled on 8/19/21    Patient declines all services (liaison + [facility]) for Praluent as of  2/28/23    ICD-10 Capture: Mixed hyperlipidemia [E78.2]

## 2023-03-10 PROCEDURE — RXMED WILLOW AMBULATORY MEDICATION CHARGE: Performed by: INTERNAL MEDICINE

## 2023-03-16 ENCOUNTER — PHARMACY VISIT (OUTPATIENT)
Dept: PHARMACY | Facility: MEDICAL CENTER | Age: 63
End: 2023-03-16
Payer: MEDICARE

## 2023-04-14 ENCOUNTER — TELEPHONE (OUTPATIENT)
Dept: VASCULAR LAB | Facility: MEDICAL CENTER | Age: 63
End: 2023-04-14
Payer: MEDICARE

## 2023-04-14 NOTE — TELEPHONE ENCOUNTER
Pt called stating that he is having leg cramps/myalgias for the last 3 months for so that he is associating w/ statin use.    Advised pt to start using vitamin D and CoQ10 supplementation to see if this alleviates his myalgias. He will try this for 1-2 weeks and c/b if he does not experience any relief.    Tirso Grijalva, PharmD, BCACP  
PAST MEDICAL HISTORY:  Opioid abuse, continuous use

## 2023-04-24 PROCEDURE — RXMED WILLOW AMBULATORY MEDICATION CHARGE: Performed by: INTERNAL MEDICINE

## 2023-04-28 ENCOUNTER — PHARMACY VISIT (OUTPATIENT)
Dept: PHARMACY | Facility: MEDICAL CENTER | Age: 63
End: 2023-04-28
Payer: MEDICARE

## 2023-05-01 ENCOUNTER — APPOINTMENT (OUTPATIENT)
Dept: RADIOLOGY | Facility: MEDICAL CENTER | Age: 63
End: 2023-05-01
Attending: EMERGENCY MEDICINE
Payer: MEDICARE

## 2023-05-01 ENCOUNTER — HOSPITAL ENCOUNTER (EMERGENCY)
Facility: MEDICAL CENTER | Age: 63
End: 2023-05-01
Attending: EMERGENCY MEDICINE
Payer: MEDICARE

## 2023-05-01 ENCOUNTER — PHARMACY VISIT (OUTPATIENT)
Dept: PHARMACY | Facility: MEDICAL CENTER | Age: 63
End: 2023-05-01
Payer: MEDICARE

## 2023-05-01 VITALS
DIASTOLIC BLOOD PRESSURE: 88 MMHG | HEIGHT: 74 IN | TEMPERATURE: 97.5 F | OXYGEN SATURATION: 97 % | HEART RATE: 104 BPM | WEIGHT: 198.41 LBS | SYSTOLIC BLOOD PRESSURE: 133 MMHG | RESPIRATION RATE: 17 BRPM | BODY MASS INDEX: 25.46 KG/M2

## 2023-05-01 DIAGNOSIS — M79.604 PAIN OF RIGHT LOWER EXTREMITY: ICD-10-CM

## 2023-05-01 DIAGNOSIS — R07.89 ATYPICAL CHEST PAIN: ICD-10-CM

## 2023-05-01 DIAGNOSIS — R51.9 NONINTRACTABLE HEADACHE, UNSPECIFIED CHRONICITY PATTERN, UNSPECIFIED HEADACHE TYPE: ICD-10-CM

## 2023-05-01 LAB
ALBUMIN SERPL BCP-MCNC: 4.5 G/DL (ref 3.2–4.9)
ALBUMIN/GLOB SERPL: 2 G/DL
ALP SERPL-CCNC: 92 U/L (ref 30–99)
ALT SERPL-CCNC: 15 U/L (ref 2–50)
ANION GAP SERPL CALC-SCNC: 11 MMOL/L (ref 7–16)
AST SERPL-CCNC: 13 U/L (ref 12–45)
BASOPHILS # BLD AUTO: 0.9 % (ref 0–1.8)
BASOPHILS # BLD: 0.05 K/UL (ref 0–0.12)
BILIRUB SERPL-MCNC: 0.9 MG/DL (ref 0.1–1.5)
BUN SERPL-MCNC: 18 MG/DL (ref 8–22)
CALCIUM ALBUM COR SERPL-MCNC: 9.2 MG/DL (ref 8.5–10.5)
CALCIUM SERPL-MCNC: 9.6 MG/DL (ref 8.5–10.5)
CHLORIDE SERPL-SCNC: 108 MMOL/L (ref 96–112)
CO2 SERPL-SCNC: 21 MMOL/L (ref 20–33)
CREAT SERPL-MCNC: 0.73 MG/DL (ref 0.5–1.4)
EKG IMPRESSION: NORMAL
EOSINOPHIL # BLD AUTO: 0.11 K/UL (ref 0–0.51)
EOSINOPHIL NFR BLD: 1.9 % (ref 0–6.9)
ERYTHROCYTE [DISTWIDTH] IN BLOOD BY AUTOMATED COUNT: 43.3 FL (ref 35.9–50)
GFR SERPLBLD CREATININE-BSD FMLA CKD-EPI: 103 ML/MIN/1.73 M 2
GLOBULIN SER CALC-MCNC: 2.3 G/DL (ref 1.9–3.5)
GLUCOSE SERPL-MCNC: 105 MG/DL (ref 65–99)
HCT VFR BLD AUTO: 46 % (ref 42–52)
HGB BLD-MCNC: 16 G/DL (ref 14–18)
IMM GRANULOCYTES # BLD AUTO: 0.01 K/UL (ref 0–0.11)
IMM GRANULOCYTES NFR BLD AUTO: 0.2 % (ref 0–0.9)
LYMPHOCYTES # BLD AUTO: 1.23 K/UL (ref 1–4.8)
LYMPHOCYTES NFR BLD: 21.4 % (ref 22–41)
MCH RBC QN AUTO: 33.3 PG (ref 27–33)
MCHC RBC AUTO-ENTMCNC: 34.8 G/DL (ref 33.7–35.3)
MCV RBC AUTO: 95.6 FL (ref 81.4–97.8)
MONOCYTES # BLD AUTO: 0.48 K/UL (ref 0–0.85)
MONOCYTES NFR BLD AUTO: 8.3 % (ref 0–13.4)
NEUTROPHILS # BLD AUTO: 3.87 K/UL (ref 1.82–7.42)
NEUTROPHILS NFR BLD: 67.3 % (ref 44–72)
NRBC # BLD AUTO: 0 K/UL
NRBC BLD-RTO: 0 /100 WBC
PLATELET # BLD AUTO: 180 K/UL (ref 164–446)
PMV BLD AUTO: 10.7 FL (ref 9–12.9)
POTASSIUM SERPL-SCNC: 4.3 MMOL/L (ref 3.6–5.5)
PROT SERPL-MCNC: 6.8 G/DL (ref 6–8.2)
RBC # BLD AUTO: 4.81 M/UL (ref 4.7–6.1)
SODIUM SERPL-SCNC: 140 MMOL/L (ref 135–145)
TROPONIN T SERPL-MCNC: <6 NG/L (ref 6–19)
TROPONIN T SERPL-MCNC: <6 NG/L (ref 6–19)
WBC # BLD AUTO: 5.8 K/UL (ref 4.8–10.8)

## 2023-05-01 PROCEDURE — 93005 ELECTROCARDIOGRAM TRACING: CPT | Performed by: EMERGENCY MEDICINE

## 2023-05-01 PROCEDURE — 80053 COMPREHEN METABOLIC PANEL: CPT

## 2023-05-01 PROCEDURE — 71045 X-RAY EXAM CHEST 1 VIEW: CPT

## 2023-05-01 PROCEDURE — 700102 HCHG RX REV CODE 250 W/ 637 OVERRIDE(OP): Performed by: EMERGENCY MEDICINE

## 2023-05-01 PROCEDURE — 93971 EXTREMITY STUDY: CPT | Mod: RT

## 2023-05-01 PROCEDURE — 85025 COMPLETE CBC W/AUTO DIFF WBC: CPT

## 2023-05-01 PROCEDURE — RXMED WILLOW AMBULATORY MEDICATION CHARGE: Performed by: EMERGENCY MEDICINE

## 2023-05-01 PROCEDURE — A9270 NON-COVERED ITEM OR SERVICE: HCPCS | Performed by: EMERGENCY MEDICINE

## 2023-05-01 PROCEDURE — 36415 COLL VENOUS BLD VENIPUNCTURE: CPT

## 2023-05-01 PROCEDURE — 99285 EMERGENCY DEPT VISIT HI MDM: CPT

## 2023-05-01 PROCEDURE — 70450 CT HEAD/BRAIN W/O DYE: CPT

## 2023-05-01 PROCEDURE — 84484 ASSAY OF TROPONIN QUANT: CPT | Mod: 91

## 2023-05-01 RX ORDER — CYCLOBENZAPRINE HCL 10 MG
10 TABLET ORAL 3 TIMES DAILY PRN
Qty: 15 TABLET | Refills: 0 | Status: SHIPPED | OUTPATIENT
Start: 2023-05-01 | End: 2023-08-07

## 2023-05-01 RX ORDER — IBUPROFEN 800 MG/1
800 TABLET ORAL EVERY 8 HOURS PRN
Qty: 30 TABLET | Refills: 0 | Status: SHIPPED | OUTPATIENT
Start: 2023-05-01

## 2023-05-01 RX ORDER — IBUPROFEN 800 MG/1
800 TABLET ORAL EVERY 8 HOURS PRN
Qty: 30 TABLET | Refills: 0 | Status: SHIPPED | OUTPATIENT
Start: 2023-05-01 | End: 2023-05-01 | Stop reason: SDUPTHER

## 2023-05-01 RX ORDER — CYCLOBENZAPRINE HCL 10 MG
10 TABLET ORAL 3 TIMES DAILY PRN
Qty: 15 TABLET | Refills: 0 | Status: SHIPPED | OUTPATIENT
Start: 2023-05-01 | End: 2023-05-01 | Stop reason: SDUPTHER

## 2023-05-01 RX ORDER — IBUPROFEN 600 MG/1
600 TABLET ORAL ONCE
Status: COMPLETED | OUTPATIENT
Start: 2023-05-01 | End: 2023-05-01

## 2023-05-01 RX ADMIN — IBUPROFEN 600 MG: 600 TABLET, FILM COATED ORAL at 13:17

## 2023-05-01 NOTE — ED TRIAGE NOTES
"Chief Complaint   Patient presents with    Dizziness     X 3-4 weeks. Reports dizziness as \"feeling like spinning.\" Patient reports associated \"eye burning\" with dizziness.     Migraine     X 3-4 weeks. Reports pain in temples as well as ears ringing. Patient states he has been having trouble with concentration and word finding difficulty x 3-4 days.     Chest Pain     Sharp/shooting pains in chest x 3-4 days.      Patient also reports right sided knee pain.   "

## 2023-05-01 NOTE — ED PROVIDER NOTES
"ED Provider Note    CHIEF COMPLAINT  Chief Complaint   Patient presents with    Dizziness     X 3-4 weeks. Reports dizziness as \"feeling like spinning.\" Patient reports associated \"eye burning\" with dizziness.     Migraine     X 3-4 weeks. Reports pain in temples as well as ears ringing. Patient states he has been having trouble with concentration and word finding difficulty x 3-4 days.     Chest Pain     Sharp/shooting pains in chest x 3-4 days.        EXTERNAL RECORDS REVIEWED  None    HPI/ROS  LIMITATION TO HISTORY   None  OUTSIDE HISTORIAN(S):  None    Anthony Reyes is a 63 y.o. male who presents here for evaluation of intermittent dizziness for approximately a month, intermittent headaches as well, and fatigue.  Patient states he is also had some left-sided chest pain that is in the left side of his chest.  Patient has no associated shortness of breath, and no associated abdominal pain.  Patient has been worked up in the past for the same, but states that \"lots of things are going wrong\" so he came in to be evaluated.    PAST MEDICAL HISTORY   has a past medical history of CAD (coronary artery disease) (02/03/2017).    SURGICAL HISTORY   has a past surgical history that includes orif, ankle (Right); multiple coronary artery bypass endo vein harvest (2/3/2018); and carola (2/3/2018).    FAMILY HISTORY  Family History   Problem Relation Age of Onset    Heart Disease Maternal Uncle 54        CABG       SOCIAL HISTORY  Social History     Tobacco Use    Smoking status: Never    Smokeless tobacco: Former     Quit date: 1980    Tobacco comments:     Chewed for 5 years   Vaping Use    Vaping Use: Never used   Substance and Sexual Activity    Alcohol use: No    Drug use: No    Sexual activity: Not on file       CURRENT MEDICATIONS  Home Medications       Reviewed by Cheyanne Amos R.N. (Registered Nurse) on 05/01/23 at 0907  Med List Status: Partial     Medication Last Dose Status   Alirocumab 75 MG/ML Solution " "Auto-injector  Active   Ascorbic Acid (VITAMIN C) 1000 MG Tab  Active   aspirin EC 81 MG EC tablet  Active   atorvastatin (LIPITOR) 80 MG tablet  Active   enalapril (VASOTEC) 5 MG Tab  Active   Omega-3 Fatty Acids (FISH OIL) 1000 MG Cap capsule  Active   omeprazole (PRILOSEC) 40 MG delayed-release capsule  Active   polyethylene glycol-electrolytes (GOLYTELY) 236 GM Recon Soln  Active   polyethylene glycol-electrolytes (GOLYTELY) 236 GM Recon Soln  Active   ZINC OXIDE PO  Active                    ALLERGIES  No Known Allergies    PHYSICAL EXAM  VITAL SIGNS: BP (!) 143/97   Pulse 67   Temp 36.4 °C (97.5 °F) (Temporal)   Resp 19   Ht 1.88 m (6' 2\")   Wt 90 kg (198 lb 6.6 oz)   SpO2 94%   BMI 25.47 kg/m²    Constitutional: Well developed, well nourished. No acute distress.  HEENT: Normocephalic, atraumatic. Posterior pharynx clear and moist.  Eyes:  EOMI. Normal sclera.  Neck: Supple, Full range of motion, nontender.  Chest/Pulmonary: clear to ausculation. Symmetrical expansion.   Cardio: Regular rate and rhythm with no murmur.   Abdomen: Soft, nontender. No peritoneal signs. No guarding. No palpable masses.  Musculoskeletal: No deformity, no edema, neurovascular intact.   Neuro: Clear speech, appropriate, cooperative, cranial nerves II-XII grossly intact.  Psych: Normal mood and affect      DIAGNOSTIC STUDIES / PROCEDURES  Results for orders placed or performed during the hospital encounter of 05/01/23   CBC with Differential   Result Value Ref Range    WBC 5.8 4.8 - 10.8 K/uL    RBC 4.81 4.70 - 6.10 M/uL    Hemoglobin 16.0 14.0 - 18.0 g/dL    Hematocrit 46.0 42.0 - 52.0 %    MCV 95.6 81.4 - 97.8 fL    MCH 33.3 (H) 27.0 - 33.0 pg    MCHC 34.8 33.7 - 35.3 g/dL    RDW 43.3 35.9 - 50.0 fL    Platelet Count 180 164 - 446 K/uL    MPV 10.7 9.0 - 12.9 fL    Neutrophils-Polys 67.30 44.00 - 72.00 %    Lymphocytes 21.40 (L) 22.00 - 41.00 %    Monocytes 8.30 0.00 - 13.40 %    Eosinophils 1.90 0.00 - 6.90 %    Basophils " 0.90 0.00 - 1.80 %    Immature Granulocytes 0.20 0.00 - 0.90 %    Nucleated RBC 0.00 /100 WBC    Neutrophils (Absolute) 3.87 1.82 - 7.42 K/uL    Lymphs (Absolute) 1.23 1.00 - 4.80 K/uL    Monos (Absolute) 0.48 0.00 - 0.85 K/uL    Eos (Absolute) 0.11 0.00 - 0.51 K/uL    Baso (Absolute) 0.05 0.00 - 0.12 K/uL    Immature Granulocytes (abs) 0.01 0.00 - 0.11 K/uL    NRBC (Absolute) 0.00 K/uL   Complete Metabolic Panel (CMP)   Result Value Ref Range    Sodium 140 135 - 145 mmol/L    Potassium 4.3 3.6 - 5.5 mmol/L    Chloride 108 96 - 112 mmol/L    Co2 21 20 - 33 mmol/L    Anion Gap 11.0 7.0 - 16.0    Glucose 105 (H) 65 - 99 mg/dL    Bun 18 8 - 22 mg/dL    Creatinine 0.73 0.50 - 1.40 mg/dL    Calcium 9.6 8.5 - 10.5 mg/dL    AST(SGOT) 13 12 - 45 U/L    ALT(SGPT) 15 2 - 50 U/L    Alkaline Phosphatase 92 30 - 99 U/L    Total Bilirubin 0.9 0.1 - 1.5 mg/dL    Albumin 4.5 3.2 - 4.9 g/dL    Total Protein 6.8 6.0 - 8.2 g/dL    Globulin 2.3 1.9 - 3.5 g/dL    A-G Ratio 2.0 g/dL   Troponins NOW   Result Value Ref Range    Troponin T <6 6 - 19 ng/L   Troponins in two (2) hours   Result Value Ref Range    Troponin T <6 6 - 19 ng/L   CORRECTED CALCIUM   Result Value Ref Range    Correct Calcium 9.2 8.5 - 10.5 mg/dL   ESTIMATED GFR   Result Value Ref Range    GFR (CKD-EPI) 103 >60 mL/min/1.73 m 2         RADIOLOGY  I have independently interpreted the diagnostic imaging associated with this visit and am waiting the final reading from the radiologist.   My preliminary interpretation is as follows: see below  Radiologist interpretation:   US-EXTREMITY VENOUS LOWER UNILAT RIGHT   Final Result      DX-CHEST-PORTABLE (1 VIEW)   Final Result         1. No acute cardiopulmonary abnormalities are identified.      CT-HEAD W/O   Final Result         1. No acute intracranial abnormality. No evidence of acute intracranial hemorrhage or mass lesion.                           COURSE & MEDICAL DECISION MAKING      INITIAL ASSESSMENT, COURSE AND  "PLAN  Care Narrative: The patient is a 61-year-old male here for multiple concerns.  He states he has been having headaches, over the last month or so, but has not taken any Tylenol or Motrin for it.  He states he has been having chest pain in the last 4 days, it stays in the left upper chest, does not radiate.  Patient also states that he is having trouble concentrating as well over the last month or so.  He denies any fall or trauma, he has no paresthesias or weakness.  Patient has no abdominal pain, or back pain.  At this time patient has a negative CT scan that was done for his headaches, he has 2 negative troponins, and a negative ultrasound for his right lower extremity.  Patient has no shortness of breath, and he has follow-up as outpatient.  At this time he will be discharged home, and will return here for any further issues or concerns.  Regarding the \"trouble finding words,\"  he actually is referencing the point that he is able to speak normally, but will occassionally have trouble finding the 'exact' word hes looking for.  He does not have a 'word salad' type of issue,but rather has trouble recalling a name of an object or a person.          DISPOSITION AND DISCUSSIONS  I have discussed management of the patient with the following physicians and SHAQUILLE's: None    Discussion of management with other [facility] or appropriate source(s): None    Escalation of care considered, and ultimately not performed: None    Barriers to care at this time, including but not limited to: Patient does not have established PCP.     Decision tools and prescription drugs considered including, but not limited to: None.    FINAL DIAGNOSIS  Right lower ext pain, chronic  Atypical chest pain.   Headache        Electronically signed by: Theodore Duncan D.O., 5/1/2023 12:06 PM      "

## 2023-05-01 NOTE — ED NOTES
Patient discharged home per ERP.  Discharge teaching and education discussed with patient. POC discussed.   Patient verbalized understanding of discharge teaching and education. No other questions at this time.     RX x 2 sent to pharmacy by MD. Patient pharmacy changed per patient request.     VSS. Patient alert and oriented. Patient arranged ride for self. Able to ambulate off unit safely with steady gait.

## 2023-06-12 ENCOUNTER — HOSPITAL ENCOUNTER (OUTPATIENT)
Dept: LAB | Facility: MEDICAL CENTER | Age: 63
End: 2023-06-12
Attending: INTERNAL MEDICINE
Payer: MEDICARE

## 2023-06-12 DIAGNOSIS — I24.9 ACS (ACUTE CORONARY SYNDROME) (HCC): ICD-10-CM

## 2023-06-12 DIAGNOSIS — I25.10 CORONARY ARTERY DISEASE INVOLVING NATIVE CORONARY ARTERY OF NATIVE HEART WITHOUT ANGINA PECTORIS: ICD-10-CM

## 2023-06-12 DIAGNOSIS — E78.2 MIXED HYPERLIPIDEMIA: ICD-10-CM

## 2023-06-12 DIAGNOSIS — E78.41 ELEVATED LIPOPROTEIN(A): ICD-10-CM

## 2023-06-12 LAB
ALBUMIN SERPL BCP-MCNC: 4.3 G/DL (ref 3.2–4.9)
ALBUMIN/GLOB SERPL: 2 G/DL
ALP SERPL-CCNC: 87 U/L (ref 30–99)
ALT SERPL-CCNC: 17 U/L (ref 2–50)
ANION GAP SERPL CALC-SCNC: 12 MMOL/L (ref 7–16)
AST SERPL-CCNC: 12 U/L (ref 12–45)
BILIRUB SERPL-MCNC: 0.5 MG/DL (ref 0.1–1.5)
BUN SERPL-MCNC: 19 MG/DL (ref 8–22)
CALCIUM ALBUM COR SERPL-MCNC: 9 MG/DL (ref 8.5–10.5)
CALCIUM SERPL-MCNC: 9.2 MG/DL (ref 8.5–10.5)
CHLORIDE SERPL-SCNC: 105 MMOL/L (ref 96–112)
CHOLEST SERPL-MCNC: 144 MG/DL (ref 100–199)
CO2 SERPL-SCNC: 24 MMOL/L (ref 20–33)
CREAT SERPL-MCNC: 0.81 MG/DL (ref 0.5–1.4)
EST. AVERAGE GLUCOSE BLD GHB EST-MCNC: 117 MG/DL
FASTING STATUS PATIENT QL REPORTED: NORMAL
GFR SERPLBLD CREATININE-BSD FMLA CKD-EPI: 100 ML/MIN/1.73 M 2
GLOBULIN SER CALC-MCNC: 2.1 G/DL (ref 1.9–3.5)
GLUCOSE SERPL-MCNC: 114 MG/DL (ref 65–99)
HBA1C MFR BLD: 5.7 % (ref 4–5.6)
HDLC SERPL-MCNC: 52 MG/DL
LDLC SERPL CALC-MCNC: 78 MG/DL
POTASSIUM SERPL-SCNC: 4.4 MMOL/L (ref 3.6–5.5)
PROT SERPL-MCNC: 6.4 G/DL (ref 6–8.2)
SODIUM SERPL-SCNC: 141 MMOL/L (ref 135–145)
TRIGL SERPL-MCNC: 71 MG/DL (ref 0–149)

## 2023-06-12 PROCEDURE — 80061 LIPID PANEL: CPT

## 2023-06-12 PROCEDURE — 36415 COLL VENOUS BLD VENIPUNCTURE: CPT | Mod: GA

## 2023-06-12 PROCEDURE — 83036 HEMOGLOBIN GLYCOSYLATED A1C: CPT | Mod: GA

## 2023-06-12 PROCEDURE — 80053 COMPREHEN METABOLIC PANEL: CPT

## 2023-06-13 PROCEDURE — RXMED WILLOW AMBULATORY MEDICATION CHARGE: Performed by: INTERNAL MEDICINE

## 2023-06-15 ENCOUNTER — PHARMACY VISIT (OUTPATIENT)
Dept: PHARMACY | Facility: MEDICAL CENTER | Age: 63
End: 2023-06-15
Payer: MEDICARE

## 2023-06-19 ENCOUNTER — NON-PROVIDER VISIT (OUTPATIENT)
Dept: CARDIOLOGY | Facility: MEDICAL CENTER | Age: 63
End: 2023-06-19
Attending: INTERNAL MEDICINE
Payer: MEDICARE

## 2023-06-19 ENCOUNTER — PHARMACY VISIT (OUTPATIENT)
Dept: PHARMACY | Facility: MEDICAL CENTER | Age: 63
End: 2023-06-19
Payer: MEDICARE

## 2023-06-19 VITALS — HEART RATE: 70 BPM | DIASTOLIC BLOOD PRESSURE: 81 MMHG | SYSTOLIC BLOOD PRESSURE: 120 MMHG

## 2023-06-19 DIAGNOSIS — R73.03 PREDIABETES: ICD-10-CM

## 2023-06-19 DIAGNOSIS — E78.2 MIXED HYPERLIPIDEMIA: ICD-10-CM

## 2023-06-19 PROCEDURE — 99212 OFFICE O/P EST SF 10 MIN: CPT

## 2023-06-19 PROCEDURE — RXMED WILLOW AMBULATORY MEDICATION CHARGE: Performed by: INTERNAL MEDICINE

## 2023-06-19 RX ORDER — EZETIMIBE 10 MG/1
10 TABLET ORAL DAILY
Qty: 30 TABLET | Refills: 3 | Status: SHIPPED | OUTPATIENT
Start: 2023-06-19 | End: 2023-08-07

## 2023-06-19 NOTE — PROGRESS NOTES
[facility] - [facility]Up Visit  Date of Service: 06/19/23    Anthony Reyes is here for follow up of dyslipidemia    Subjective    HPI  Pertinent Interval History since last visit:   Patient had a visit to ED for a few weeks ago for dizziness, fatigue and overall just not feeling well. Reports he called the clinic because he was experiencing joint pain and muscle aches and was told to take CoQ10 and Vit D. He reports he had to stop all meds about 2 weeks ago for a colonoscopy which he did and he feels so much better since being off of Atorvastatin.   Current Prescription Lipid Lowering Medications - including dose:   Statin: Atorvastatin 80 mg once daily  Non-Statin: Praluent 75 mg subQ once every 14 days  Current Lipid Lowering and Related Supplements:   OTC fish oil  Any Current Side Effects Potentially Related to Lipid Lowering therapy?   Yes, Details: Patient reports having muscle pain and joint pain that were miserable and have resolved since stopping his statin med about 2 weeks ago  Current Adherence to Lipid Lowering Therapies:  Complete  Any Previous History of Statin Intolerance?   No  Baseline Lipids Prior to Treatment:    Ref. Range 2/2/2018 23:09   Cholesterol,Tot Latest Ref Range: 100 - 199 mg/dL 157   Triglycerides Latest Ref Range: 0 - 149 mg/dL 99   HDL Latest Ref Range: >=40 mg/dL 36 (A)   LDL Latest Ref Range: <100 mg/dL 101 (H)          SOCIAL HISTORY  Social History     Tobacco Use   Smoking Status Never   Smokeless Tobacco Former    Quit date: 1980   Tobacco Comments    Chewed for 5 years   Vaping Use    Vaping Use: Never used      Change in weight: Stable  Exercise habits: moderate regular exercise program - pt does construction and hard labor job daily and also walks two miles daily  Diet: common adult- patient had cut out sugar, concentrating on lean meats, veggies and eats red meat about once a week       Objective    There were no vitals filed for this visit.   Physical  Exam    DATA REVIEW  Most Recent Lipid Panel:   Lab Results   Component Value Date    CHOLSTRLTOT 144 06/12/2023    TRIGLYCERIDE 71 06/12/2023    HDL 52 06/12/2023    LDL 78 06/12/2023       Other Pertinent Blood Work:   Lab Results   Component Value Date    SODIUM 141 06/12/2023    POTASSIUM 4.4 06/12/2023    CHLORIDE 105 06/12/2023    CO2 24 06/12/2023    ANION 12.0 06/12/2023    GLUCOSE 114 (H) 06/12/2023    BUN 19 06/12/2023    CREATININE 0.81 06/12/2023    CALCIUM 9.2 06/12/2023    ASTSGOT 12 06/12/2023    ALTSGPT 17 06/12/2023    ALKPHOSPHAT 87 06/12/2023    TBILIRUBIN 0.5 06/12/2023    ALBUMIN 4.3 06/12/2023    AGRATIO 2.0 06/12/2023       Other:  NA    Recent Imaging Studies:    None since last visit          ASSESSMENT AND PLAN  Patient Type, check all that apply:   Secondary Prevention  Established Atherosclerotic Cardiovascular Disease (ASCVD)  Yes, Details: hx of MI + CABG in 2019  Other Established (non-atherosclerotic) Vascular Disease, if Present:    None  Evidence of Heterozygous Familial Hypercholesterolemia (FH): No (If yes, how was diagnosis made)  ACC/AHA Indication for Statin Therapy, wagner all that apply:   Established ASCVD: Indication for High intensity statin    Calculated Risk for ASCVD, if applicable:  N/A  Other Significant Risk Markers, if any, wagner all that apply:  Other: Elevated LipoA  National Lipid Association (NLA) Goal (if applicable):  LDL-C:   <70 mg/dL  Lifestyle Recommendations From Today’s Visit:   Eating Plan: Concentrate on  Low sat/Trans fat, Low simple carb, and DASH/Med Style diet  Exercise: Continue with current exercise and increase as tolerable  Statin Recommendations from Today's Visit  D/C Atorvastatin for now. Patient willing to revisit this issue and maybe retrial again at a later time  Non-Statin Medications Recommendations from Today’s Visit:   Continue with Praluent 75mg Q14 days  Begin Ezetimibe 10mg QD  Indication for PCSK9 Inhibitor, if applicable:  ASCVD  with suboptimal control of LDL-C despite maximally tolerated statin  Supplements Recommended at this visit:  Continue with CoQ10 and start Vit D  Recommendations for Other Cardiovascular Risk Factors, wagner all that apply:   Diabetes/Impaired Fasting Glucose- Last A1c = 5.7% - counseled pt on limiting carb intake and omitting sweets and empty carb intake as well as TLC   Other Issues:  Patient's most recent labs indicate that LDL level has increased to 78 mg/dL which is above his goal of <70mg/dL likely due to being off of his statin since stopping it for colonoscopy a few weeks ago.   Patient reports that he was having muscle pain and joint pain that have since resolved and he attributes to statin   Will continue with statin holiday for now, but patient willing to revisit a possible retrial at the next follow up  Patient willing to restart back on Ezetimibe 10mg QD to help get patient's LDL back <70     Studies Ordered at Todays Visit:  None   Blood Work Ordered At Today’s visit:   Lipid panel, CMP, A1c  Follow-Up:   3 months    Tircia LoD      CC:  Matteo Berrios P.A.-C.  No ref. provider found

## 2023-08-04 ENCOUNTER — HOSPITAL ENCOUNTER (OUTPATIENT)
Dept: LAB | Facility: MEDICAL CENTER | Age: 63
End: 2023-08-04
Attending: INTERNAL MEDICINE
Payer: MEDICARE

## 2023-08-04 DIAGNOSIS — R73.03 PREDIABETES: ICD-10-CM

## 2023-08-04 DIAGNOSIS — E78.2 MIXED HYPERLIPIDEMIA: ICD-10-CM

## 2023-08-04 LAB
ALBUMIN SERPL BCP-MCNC: 4.7 G/DL (ref 3.2–4.9)
ALBUMIN/GLOB SERPL: 2.1 G/DL
ALP SERPL-CCNC: 78 U/L (ref 30–99)
ALT SERPL-CCNC: 19 U/L (ref 2–50)
ANION GAP SERPL CALC-SCNC: 11 MMOL/L (ref 7–16)
AST SERPL-CCNC: 23 U/L (ref 12–45)
BILIRUB SERPL-MCNC: 1.4 MG/DL (ref 0.1–1.5)
BUN SERPL-MCNC: 25 MG/DL (ref 8–22)
CALCIUM ALBUM COR SERPL-MCNC: 9.1 MG/DL (ref 8.5–10.5)
CALCIUM SERPL-MCNC: 9.7 MG/DL (ref 8.5–10.5)
CHLORIDE SERPL-SCNC: 105 MMOL/L (ref 96–112)
CHOLEST SERPL-MCNC: 104 MG/DL (ref 100–199)
CO2 SERPL-SCNC: 21 MMOL/L (ref 20–33)
CREAT SERPL-MCNC: 0.84 MG/DL (ref 0.5–1.4)
EST. AVERAGE GLUCOSE BLD GHB EST-MCNC: 111 MG/DL
FASTING STATUS PATIENT QL REPORTED: NORMAL
GFR SERPLBLD CREATININE-BSD FMLA CKD-EPI: 98 ML/MIN/1.73 M 2
GLOBULIN SER CALC-MCNC: 2.2 G/DL (ref 1.9–3.5)
GLUCOSE SERPL-MCNC: 92 MG/DL (ref 65–99)
HBA1C MFR BLD: 5.5 % (ref 4–5.6)
HDLC SERPL-MCNC: 67 MG/DL
LDLC SERPL CALC-MCNC: 32 MG/DL
POTASSIUM SERPL-SCNC: 4.2 MMOL/L (ref 3.6–5.5)
PROT SERPL-MCNC: 6.9 G/DL (ref 6–8.2)
SODIUM SERPL-SCNC: 137 MMOL/L (ref 135–145)
TRIGL SERPL-MCNC: 27 MG/DL (ref 0–149)

## 2023-08-04 PROCEDURE — 83036 HEMOGLOBIN GLYCOSYLATED A1C: CPT | Mod: GA

## 2023-08-04 PROCEDURE — 36415 COLL VENOUS BLD VENIPUNCTURE: CPT | Mod: GA

## 2023-08-04 PROCEDURE — 80061 LIPID PANEL: CPT

## 2023-08-04 PROCEDURE — 80053 COMPREHEN METABOLIC PANEL: CPT

## 2023-08-07 ENCOUNTER — OFFICE VISIT (OUTPATIENT)
Dept: CARDIOLOGY | Facility: MEDICAL CENTER | Age: 63
End: 2023-08-07
Attending: INTERNAL MEDICINE
Payer: MEDICARE

## 2023-08-07 VITALS
WEIGHT: 202 LBS | DIASTOLIC BLOOD PRESSURE: 60 MMHG | SYSTOLIC BLOOD PRESSURE: 114 MMHG | BODY MASS INDEX: 25.93 KG/M2 | OXYGEN SATURATION: 96 % | HEIGHT: 74 IN | RESPIRATION RATE: 16 BRPM | HEART RATE: 79 BPM

## 2023-08-07 DIAGNOSIS — E78.2 MIXED HYPERLIPIDEMIA: ICD-10-CM

## 2023-08-07 DIAGNOSIS — I25.5 ISCHEMIC CARDIOMYOPATHY: ICD-10-CM

## 2023-08-07 DIAGNOSIS — I25.9 ISCHEMIC HEART DISEASE DUE TO CORONARY ARTERY OBSTRUCTION (HCC): ICD-10-CM

## 2023-08-07 DIAGNOSIS — Z95.1 HX OF CABG: ICD-10-CM

## 2023-08-07 DIAGNOSIS — E78.41 ELEVATED LIPOPROTEIN(A): ICD-10-CM

## 2023-08-07 DIAGNOSIS — I24.0 ISCHEMIC HEART DISEASE DUE TO CORONARY ARTERY OBSTRUCTION (HCC): ICD-10-CM

## 2023-08-07 PROCEDURE — 99214 OFFICE O/P EST MOD 30 MIN: CPT | Performed by: INTERNAL MEDICINE

## 2023-08-07 PROCEDURE — 3078F DIAST BP <80 MM HG: CPT | Performed by: INTERNAL MEDICINE

## 2023-08-07 PROCEDURE — 3074F SYST BP LT 130 MM HG: CPT | Performed by: INTERNAL MEDICINE

## 2023-08-07 PROCEDURE — 99212 OFFICE O/P EST SF 10 MIN: CPT | Performed by: INTERNAL MEDICINE

## 2023-08-07 ASSESSMENT — ENCOUNTER SYMPTOMS
WEIGHT GAIN: 0
COUGH: 0
PALPITATIONS: 0
VOMITING: 0
FLANK PAIN: 0
DYSPNEA ON EXERTION: 0
WEIGHT LOSS: 0
ALTERED MENTAL STATUS: 0
CONSTIPATION: 0
DEPRESSION: 0
ORTHOPNEA: 0
NAUSEA: 0
BACK PAIN: 0
CLAUDICATION: 0
BLURRED VISION: 0
NEAR-SYNCOPE: 0
FEVER: 0
ABDOMINAL PAIN: 0
IRREGULAR HEARTBEAT: 0
DECREASED APPETITE: 0
HEARTBURN: 0
DIARRHEA: 0
PND: 0
SHORTNESS OF BREATH: 0
DIZZINESS: 0
SYNCOPE: 0

## 2023-08-07 ASSESSMENT — FIBROSIS 4 INDEX: FIB4 SCORE: 1.82

## 2023-08-07 NOTE — PROGRESS NOTES
Cardiology Note    Chief Complaint   Patient presents with    Coronary Artery Disease    Cardiomyopathy (Ischemic)       History of Present Illness: Anthony Reyes is a 63 y.o. male who presents for follow up. Previously followed Dr Mcdonnell for CAD, hx CABG, HTN, HLD.    Describes muscle and joint aches he initially suspected statin. However following colonoscopy symptoms have resolved. Was taking half tab statin but last three days back to full tab. No other cardiac complaints. Remains active. Works laborious job with concrete. Sometimes gets fatigued faster but states has been baseline.     Review of Systems   Constitutional: Negative for decreased appetite, fever, malaise/fatigue, weight gain and weight loss.   HENT:  Negative for congestion and nosebleeds.    Eyes:  Negative for blurred vision.   Cardiovascular:  Negative for chest pain, claudication, dyspnea on exertion, irregular heartbeat, leg swelling, near-syncope, orthopnea, palpitations, paroxysmal nocturnal dyspnea and syncope.   Respiratory:  Negative for cough and shortness of breath.    Endocrine: Negative for cold intolerance and heat intolerance.   Skin:  Negative for rash.   Musculoskeletal:  Negative for back pain.   Gastrointestinal:  Negative for abdominal pain, constipation, diarrhea, heartburn, melena, nausea and vomiting.   Genitourinary:  Negative for dysuria, flank pain and hematuria.   Neurological:  Negative for dizziness.   Psychiatric/Behavioral:  Negative for altered mental status and depression.          Past Medical History:   Diagnosis Date    CAD (coronary artery disease) 02/03/2017    CABGx3         Past Surgical History:   Procedure Laterality Date    MULTIPLE CORONARY ARTERY BYPASS ENDO VEIN HARVEST  2/3/2018    Procedure: MULTIPLE CORONARY ARTERY BYPASS X3, ENDO VEIN HARVEST RIGHT LOWER LEG;  Surgeon: Fredrick Burger M.D.;  Location: SURGERY [facility];  Service: Cardiothoracic    PB  2/3/2018    Procedure: PB;   Surgeon: Fredrick Burger M.D.;  Location: SURGERY [facility];  Service: Cardiothoracic    ORIF, ANKLE Right          Current Outpatient Medications   Medication Sig Dispense Refill    ATORVASTATIN CALCIUM PO Take  by mouth every day.      coenzyme Q-10 30 MG capsule Take 60 mg by mouth every day.      ibuprofen (MOTRIN) 800 MG Tab Take 1 Tablet by mouth every 8 hours as needed for Mild Pain. 30 Tablet 0    Alirocumab 75 MG/ML Solution Auto-injector Inject 75 mg (1 pen) under the skin every 14 days. 6 mL 3    enalapril (VASOTEC) 5 MG Tab Take 1 Tablet by mouth every day. 90 Tablet 11    Ascorbic Acid (VITAMIN C) 1000 MG Tab Take 1,000 mg by mouth every day.      Omega-3 Fatty Acids (FISH OIL) 1000 MG Cap capsule Take 1,000 mg by mouth every day.      omeprazole (PRILOSEC) 40 MG delayed-release capsule Take 40 mg by mouth every evening.      aspirin EC 81 MG EC tablet Take 1 Tab by mouth every day. 30 Tab      No current facility-administered medications for this visit.         No Known Allergies      Family History   Problem Relation Age of Onset    Heart Disease Maternal Uncle 54        CABG         Social History     Socioeconomic History    Marital status:      Spouse name: Not on file    Number of children: Not on file    Years of education: Not on file    Highest education level: Not on file   Occupational History    Not on file   Tobacco Use    Smoking status: Never    Smokeless tobacco: Former     Quit date: 1980    Tobacco comments:     Chewed for 5 years   Vaping Use    Vaping Use: Never used   Substance and Sexual Activity    Alcohol use: No    Drug use: No    Sexual activity: Not on file   Other Topics Concern    Not on file   Social History Narrative    Not on file     Social Determinants of Health     Financial Resource Strain: Not on file   Food Insecurity: Not on file   Transportation Needs: Not on file   Physical Activity: Not on file   Stress: Not on file   Social Connections: Not on  "file   Intimate Partner Violence: Not on file   Housing Stability: Not on file         Physical Exam:  Ambulatory Vitals  /60 (BP Location: Left arm, Patient Position: Sitting, BP Cuff Size: Adult)   Pulse 79   Resp 16   Ht 1.88 m (6' 2\")   Wt 91.6 kg (202 lb)   SpO2 96%    BP Readings from Last 4 Encounters:   08/07/23 114/60   06/19/23 120/81   05/01/23 133/88   08/19/22 118/78     Weight/BMI:   Vitals:    08/07/23 1346   BP: 114/60   Weight: 91.6 kg (202 lb)   Height: 1.88 m (6' 2\")    Body mass index is 25.94 kg/m².  Wt Readings from Last 4 Encounters:   08/07/23 91.6 kg (202 lb)   05/01/23 90 kg (198 lb 6.6 oz)   12/19/22 95.3 kg (210 lb)   08/19/22 91.8 kg (202 lb 6.4 oz)       Physical Exam  Constitutional:       General: He is not in acute distress.  HENT:      Head: Normocephalic and atraumatic.   Eyes:      Conjunctiva/sclera: Conjunctivae normal.      Pupils: Pupils are equal, round, and reactive to light.   Neck:      Vascular: No JVD.   Cardiovascular:      Rate and Rhythm: Normal rate and regular rhythm.      Heart sounds: Normal heart sounds. No murmur heard.     No friction rub. No gallop.   Pulmonary:      Effort: Pulmonary effort is normal. No respiratory distress.      Breath sounds: Normal breath sounds. No wheezing or rales.   Chest:      Chest wall: No tenderness.   Abdominal:      General: Bowel sounds are normal. There is no distension.      Palpations: Abdomen is soft.   Musculoskeletal:      Cervical back: Normal range of motion and neck supple.   Skin:     General: Skin is warm and dry.   Neurological:      Mental Status: He is alert and oriented to person, place, and time.   Psychiatric:         Mood and Affect: Affect normal.         Judgment: Judgment normal.         Lab Data Review:  Lab Results   Component Value Date/Time    CHOLSTRLTOT 104 08/04/2023 01:28 PM    LDL 32 08/04/2023 01:28 PM    HDL 67 08/04/2023 01:28 PM    TRIGLYCERIDE 27 08/04/2023 01:28 PM       Lab " Results   Component Value Date/Time    SODIUM 137 08/04/2023 01:28 PM    POTASSIUM 4.2 08/04/2023 01:28 PM    CHLORIDE 105 08/04/2023 01:28 PM    CO2 21 08/04/2023 01:28 PM    GLUCOSE 92 08/04/2023 01:28 PM    BUN 25 (H) 08/04/2023 01:28 PM    CREATININE 0.84 08/04/2023 01:28 PM     Estimated Creatinine Clearance: 106 mL/min (by C-G formula based on SCr of 0.84 mg/dL).  Lab Results   Component Value Date/Time    ALKPHOSPHAT 78 08/04/2023 01:28 PM    ASTSGOT 23 08/04/2023 01:28 PM    ALTSGPT 19 08/04/2023 01:28 PM    TBILIRUBIN 1.4 08/04/2023 01:28 PM      Lab Results   Component Value Date/Time    WBC 5.8 05/01/2023 09:28 AM     Lab Results   Component Value Date/Time    HBA1C 5.5 08/04/2023 01:28 PM     No components found for: TROP      Cardiac Imaging and Procedures Review:      EKG 5/1/23 sinus, RVH w repol, inferior infarct old    2/2018 cabg  POSTOPERATIVE DIAGNOSES:  Acute non-ST elevation myocardial infarction,   coronary artery disease, acute on chronic left ventricular systolic and   diastolic failure, severe ischemic cardiomyopathy.  PROCEDURES PERFORMED:  Coronary artery bypass grafting x3 (left internal   mammary artery to the left anterior descending artery and reverse saphenous   vein grafts to the diagonal artery and distal left circumflex artery),   right endoscopic vein harvest, and intraoperative transesophageal   echocardiography.    TTE 1/29/21  CONCLUSIONS  Compared to the images of the prior study done on 07/27/18  Left ventricular ejection fraction is visually estimated to be 55%.   Apical septum hypokinesis. Normal diastolic function.  Unable to estimate pulmonary artery pressure due to an inadequate   tricuspid regurgitant jet    Medical Decision Making:  Problem List Items Addressed This Visit       Ischemic heart disease due to coronary artery obstruction (HCC)    Relevant Medications    ATORVASTATIN CALCIUM PO    Hx of CABG    Mixed hyperlipidemia    Relevant Medications    ATORVASTATIN  CALCIUM PO    Ischemic cardiomyopathy    Relevant Medications    ATORVASTATIN CALCIUM PO    Elevated lipoprotein(a)     ICM recovered LVEF / CAD / CABG - asymptomatic. Continue long term aspirin, ACEi, statin.     HLD / lipoprotein a elevation - continue max tolerated statin. Continue pcsk9i for better LDL and lipoprotein control. Threshold goal LDL <70 and trig <150.    It was my pleasure to meet with Mr. Reyes.

## 2023-09-25 PROCEDURE — RXMED WILLOW AMBULATORY MEDICATION CHARGE: Performed by: INTERNAL MEDICINE

## 2023-09-27 ENCOUNTER — PHARMACY VISIT (OUTPATIENT)
Dept: PHARMACY | Facility: MEDICAL CENTER | Age: 63
End: 2023-09-27
Payer: MEDICARE

## 2023-11-16 ENCOUNTER — NON-PROVIDER VISIT (OUTPATIENT)
Dept: CARDIOLOGY | Facility: MEDICAL CENTER | Age: 63
End: 2023-11-16
Attending: INTERNAL MEDICINE
Payer: MEDICARE

## 2023-11-16 VITALS
WEIGHT: 205 LBS | HEART RATE: 64 BPM | DIASTOLIC BLOOD PRESSURE: 86 MMHG | SYSTOLIC BLOOD PRESSURE: 153 MMHG | BODY MASS INDEX: 26.32 KG/M2

## 2023-11-16 DIAGNOSIS — I25.5 ISCHEMIC CARDIOMYOPATHY: ICD-10-CM

## 2023-11-16 DIAGNOSIS — E78.2 MIXED HYPERLIPIDEMIA: ICD-10-CM

## 2023-11-16 DIAGNOSIS — E78.41 ELEVATED LIPOPROTEIN(A): ICD-10-CM

## 2023-11-16 DIAGNOSIS — I25.10 CORONARY ARTERY DISEASE INVOLVING NATIVE CORONARY ARTERY OF NATIVE HEART WITHOUT ANGINA PECTORIS: ICD-10-CM

## 2023-11-16 DIAGNOSIS — I24.9 ACS (ACUTE CORONARY SYNDROME) (HCC): ICD-10-CM

## 2023-11-16 DIAGNOSIS — E78.2 MIXED HYPERLIPIDEMIA: Primary | ICD-10-CM

## 2023-11-16 DIAGNOSIS — Z79.899 HIGH RISK MEDICATION USE: ICD-10-CM

## 2023-11-16 DIAGNOSIS — R06.02 SOB (SHORTNESS OF BREATH): ICD-10-CM

## 2023-11-16 PROCEDURE — 99212 OFFICE O/P EST SF 10 MIN: CPT

## 2023-11-16 PROCEDURE — RXMED WILLOW AMBULATORY MEDICATION CHARGE: Performed by: NURSE PRACTITIONER

## 2023-11-16 RX ORDER — OMEPRAZOLE 40 MG/1
40 CAPSULE, DELAYED RELEASE ORAL EVERY EVENING
Qty: 90 CAPSULE | Refills: 3 | Status: SHIPPED | OUTPATIENT
Start: 2023-11-16

## 2023-11-16 RX ORDER — ATORVASTATIN CALCIUM 80 MG/1
80 TABLET, FILM COATED ORAL DAILY
Qty: 90 TABLET | Refills: 3 | Status: SHIPPED | OUTPATIENT
Start: 2023-11-16

## 2023-11-16 RX ORDER — ENALAPRIL MALEATE 5 MG/1
5 TABLET ORAL DAILY
Qty: 90 TABLET | Refills: 3 | Status: SHIPPED | OUTPATIENT
Start: 2023-11-16

## 2023-11-16 ASSESSMENT — FIBROSIS 4 INDEX: FIB4 SCORE: 1.82

## 2023-11-17 PROCEDURE — RXMED WILLOW AMBULATORY MEDICATION CHARGE: Performed by: INTERNAL MEDICINE

## 2023-11-17 NOTE — TELEPHONE ENCOUNTER
Patient is requesting for prescription refill for enalapril and omeprazole (states this was previously prescribed by cardiologist). The order has been pended for your review. Please sign if appropriate. Thank you!    Requested Prescriptions     Pending Prescriptions Disp Refills    enalapril (VASOTEC) 5 MG Tab 90 Tablet 3     Sig: Take 1 Tablet by mouth every day.    omeprazole (PRILOSEC) 40 MG delayed-release capsule 90 Capsule 3     Sig: Take 1 Capsule by mouth every evening.

## 2023-11-17 NOTE — PROGRESS NOTES
[facility] - FollowUp Visit  Date of Service: 06/19/23    Anthony Reyes is here for follow up of dyslipidemia    Subjective    HPI  Pertinent Interval History since last visit:   Patient has been back on statin therapy and no longer on Zetia. No issues since resuming statin.  Current Prescription Lipid Lowering Medications - including dose:   Statin: Atorvastatin 80 mg once daily  Non-Statin: Praluent 75 mg subQ once every 14 days  Current Lipid Lowering and Related Supplements:   OTC fish oil  Any Current Side Effects Potentially Related to Lipid Lowering therapy?   No  Current Adherence to Lipid Lowering Therapies:  Complete  Any Previous History of Statin Intolerance?   No  Baseline Lipids Prior to Treatment:    Ref. 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 Also doing strength training with light weights.  Diet: common adult; patient had cut out sugar, concentrating on lean meats, veggies and eats red meat about once a week       Objective    Vitals:    11/16/23 1628 11/16/23 1642   BP: (!) 141/85 (!) 153/86   Pulse: 65 64   Weight: 93 kg (205 lb)    Pt denies any sx of CP, SOB, blurry vision, lightheadedness/dizziness. He does report mild headache due to dental pain.    Physical Exam    DATA REVIEW  Most Recent Lipid Panel:   Lab Results   Component Value Date    CHOLSTRLTOT 104 08/04/2023    TRIGLYCERIDE 27 08/04/2023    HDL 67 08/04/2023     LDL 32 08/04/2023       Other Pertinent Blood Work:   Lab Results   Component Value Date    SODIUM 137 08/04/2023    POTASSIUM 4.2 08/04/2023    CHLORIDE 105 08/04/2023    CO2 21 08/04/2023    ANION 11.0 08/04/2023    GLUCOSE 92 08/04/2023    BUN 25 (H) 08/04/2023    CREATININE 0.84 08/04/2023    CALCIUM 9.7 08/04/2023    ASTSGOT 23 08/04/2023    ALTSGPT 19 08/04/2023    ALKPHOSPHAT 78 08/04/2023    TBILIRUBIN 1.4 08/04/2023    ALBUMIN 4.7 08/04/2023    AGRATIO 2.1 08/04/2023       Other:  NA    Recent Imaging Studies:    None since last visit          ASSESSMENT AND PLAN  Patient Type, check all that apply:   Secondary Prevention  Established Atherosclerotic Cardiovascular Disease (ASCVD)  Yes, Details: hx of MI + CABG in 2019  Other Established (non-atherosclerotic) Vascular Disease, if Present:    None  Evidence of Heterozygous Familial Hypercholesterolemia (FH): No  ACC/AHA Indication for Statin Therapy, wagner all that apply:   Established ASCVD: Indication for High intensity statin    Calculated Risk for ASCVD, if applicable:  N/A  Other Significant Risk Markers, if any, wagner all that apply:  Other: Elevated LipoA  National Lipid Association (NLA) Goal (if applicable):  LDL-C:   <70 mg/dL  Lifestyle Recommendations From Today’s Visit:   Eating Plan: Concentrate on  Low sat/Trans fat, Low simple carb, and DASH/Med Style diet  Exercise: Continue with current exercise and increase as tolerable  Statin Recommendations from Today's Visit  Continue atorvastatin 80 mg daily  Non-Statin Medications Recommendations from Today’s Visit:   Continue Praluent 75 mg Q14 days  Indication for PCSK9 Inhibitor, if applicable:  ASCVD with suboptimal control of LDL-C despite maximally tolerated statin  Supplements Recommended at this visit:  Continue with CoQ10 and Vit D  Recommendations for Other Cardiovascular Risk Factors, wagner all that apply:   None. Last A1c improved from 5.7% to 5.5%.  Other Issues:  Patient's most recent labs  indicate that LDL level has decreased to 32 mg/dL which is at goal of <70 mg/dL following resumption of statin therapy.  Patient is tolerating his lipid-lowering medications well  No changes indicated at this time  BP elevated in clinic today however this could be secondary to dental pain. Recommended monitoring BP at home and f/u with PCP/cardiology if BP remains elevated.    Studies Ordered at Todays Visit:  None   Blood Work Ordered At Today’s visit:   Lipid panel, CMP  Follow-Up:   3 months    Teresita LottD, BCACP    CC:  Michael Bloch, MD

## 2023-11-21 ENCOUNTER — PHARMACY VISIT (OUTPATIENT)
Dept: PHARMACY | Facility: MEDICAL CENTER | Age: 63
End: 2023-11-21
Payer: MEDICARE

## 2023-11-22 DIAGNOSIS — E78.2 MIXED HYPERLIPIDEMIA: Primary | ICD-10-CM

## 2023-11-22 DIAGNOSIS — E78.41 ELEVATED LIPOPROTEIN(A): ICD-10-CM

## 2023-11-27 PROCEDURE — RXMED WILLOW AMBULATORY MEDICATION CHARGE: Performed by: NURSE PRACTITIONER

## 2023-11-28 ENCOUNTER — PHARMACY VISIT (OUTPATIENT)
Dept: PHARMACY | Facility: MEDICAL CENTER | Age: 63
End: 2023-11-28
Payer: MEDICARE

## 2024-02-09 PROCEDURE — RXMED WILLOW AMBULATORY MEDICATION CHARGE: Performed by: NURSE PRACTITIONER

## 2024-02-16 ENCOUNTER — PHARMACY VISIT (OUTPATIENT)
Dept: PHARMACY | Facility: MEDICAL CENTER | Age: 64
End: 2024-02-16
Payer: COMMERCIAL

## 2024-02-22 ENCOUNTER — APPOINTMENT (OUTPATIENT)
Dept: CARDIOLOGY | Facility: MEDICAL CENTER | Age: 64
End: 2024-02-22
Attending: INTERNAL MEDICINE
Payer: MEDICARE

## 2024-02-22 ENCOUNTER — TELEPHONE (OUTPATIENT)
Dept: VASCULAR LAB | Facility: MEDICAL CENTER | Age: 64
End: 2024-02-22
Payer: MEDICARE

## 2024-02-22 NOTE — TELEPHONE ENCOUNTER
Caller: Anthony Reyes     Topic/issue: Patient states message is for Tirso;  Patient has an appointment at 2:00 pm today ( 2/22/24) , He was unable to get his lab work done as he drank a cup of coffee witch his labs call for fasting. Patient will be rescheduling his appointment.    Patient would like to reschedule for 2/26/24 at 4 pm .    Callback Number: [phone]      Thank you  Leidy RDEW

## 2024-02-23 ENCOUNTER — HOSPITAL ENCOUNTER (OUTPATIENT)
Dept: LAB | Facility: MEDICAL CENTER | Age: 64
End: 2024-02-23
Attending: NURSE PRACTITIONER
Payer: MEDICARE

## 2024-02-23 DIAGNOSIS — E78.2 MIXED HYPERLIPIDEMIA: ICD-10-CM

## 2024-02-23 LAB
ALBUMIN SERPL BCP-MCNC: 4.3 G/DL (ref 3.2–4.9)
ALBUMIN/GLOB SERPL: 2 G/DL
ALP SERPL-CCNC: 77 U/L (ref 30–99)
ALT SERPL-CCNC: 18 U/L (ref 2–50)
ANION GAP SERPL CALC-SCNC: 10 MMOL/L (ref 7–16)
AST SERPL-CCNC: 16 U/L (ref 12–45)
BILIRUB SERPL-MCNC: 0.7 MG/DL (ref 0.1–1.5)
BUN SERPL-MCNC: 19 MG/DL (ref 8–22)
CALCIUM ALBUM COR SERPL-MCNC: 8.9 MG/DL (ref 8.5–10.5)
CALCIUM SERPL-MCNC: 9.1 MG/DL (ref 8.5–10.5)
CHLORIDE SERPL-SCNC: 107 MMOL/L (ref 96–112)
CHOLEST SERPL-MCNC: 111 MG/DL (ref 100–199)
CO2 SERPL-SCNC: 24 MMOL/L (ref 20–33)
CREAT SERPL-MCNC: 0.74 MG/DL (ref 0.5–1.4)
FASTING STATUS PATIENT QL REPORTED: NORMAL
GFR SERPLBLD CREATININE-BSD FMLA CKD-EPI: 102 ML/MIN/1.73 M 2
GLOBULIN SER CALC-MCNC: 2.1 G/DL (ref 1.9–3.5)
GLUCOSE SERPL-MCNC: 112 MG/DL (ref 65–99)
HDLC SERPL-MCNC: 52 MG/DL
LDLC SERPL CALC-MCNC: 49 MG/DL
POTASSIUM SERPL-SCNC: 4.1 MMOL/L (ref 3.6–5.5)
PROT SERPL-MCNC: 6.4 G/DL (ref 6–8.2)
SODIUM SERPL-SCNC: 141 MMOL/L (ref 135–145)
TRIGL SERPL-MCNC: 49 MG/DL (ref 0–149)

## 2024-02-23 PROCEDURE — 80061 LIPID PANEL: CPT

## 2024-02-23 PROCEDURE — 36415 COLL VENOUS BLD VENIPUNCTURE: CPT

## 2024-02-23 PROCEDURE — 80053 COMPREHEN METABOLIC PANEL: CPT

## 2024-02-26 ENCOUNTER — NON-PROVIDER VISIT (OUTPATIENT)
Dept: CARDIOLOGY | Facility: MEDICAL CENTER | Age: 64
End: 2024-02-26
Attending: INTERNAL MEDICINE
Payer: MEDICARE

## 2024-02-26 VITALS
WEIGHT: 205.8 LBS | HEART RATE: 84 BPM | DIASTOLIC BLOOD PRESSURE: 80 MMHG | BODY MASS INDEX: 26.42 KG/M2 | SYSTOLIC BLOOD PRESSURE: 120 MMHG

## 2024-02-26 DIAGNOSIS — I25.10 CORONARY ARTERY DISEASE INVOLVING NATIVE CORONARY ARTERY OF NATIVE HEART WITHOUT ANGINA PECTORIS: Primary | ICD-10-CM

## 2024-02-26 PROCEDURE — 99212 OFFICE O/P EST SF 10 MIN: CPT | Performed by: INTERNAL MEDICINE

## 2024-02-26 ASSESSMENT — FIBROSIS 4 INDEX: FIB4 SCORE: 1.3

## 2024-02-26 NOTE — PROGRESS NOTES
[facility] - FollowUp Visit  Date of Service: 06/19/23    Anthony Reyes is here for follow up of dyslipidemia    Subjective    HPI  Pertinent Interval History since last visit:   None  Current Prescription Lipid Lowering Medications - including dose:   Statin: Atorvastatin 40 mg once daily -- decreased by patient from 80 mg to 40 mg daily due to myalgia, tolerating this dose well  Non-Statin: Praluent 75 mg subQ once every 14 days  Current Lipid Lowering and Related Supplements:   OTC fish oil  Any Current Side Effects Potentially Related to Lipid Lowering therapy?   No  Current Adherence to Lipid Lowering Therapies:  Complete  Any Previous History of Statin Intolerance?   Yes, Details: myalgia on atorvastatin 80 mg daily  Baseline Lipids Prior to Treatment:    Ref. Range 2/2/2018 23:09   Cholesterol,Tot Latest Ref Range: 100 - 199 mg/dL 157   Triglycerides Latest Ref Range: 0 - 149 mg/dL 99   HDL Latest Ref Range: >=40 mg/dL 36 (A)   LDL Latest Ref Range: <100 mg/dL 101 (H)        SOCIAL HISTORY  Social History     Tobacco Use   Smoking Status Never   Smokeless Tobacco Former    Quit date: 1980   Tobacco Comments    Chewed for 5 years      Change in weight: stable  Exercise habits: moderate regular exercise program - pt does construction and hard labor job daily. Typically walks two miles daily but has not been doing this much d/t weather. Also doing strength training with light weights.  Diet: common adult; patient had cut out sugar, concentrating on lean meats, veggies and eats red meat about once a week       Objective    Vitals:    02/26/24 1601   BP: 120/80   Pulse: 84   Weight: 93.4 kg (205 lb 12.8 oz)     Physical Exam    DATA REVIEW  Most Recent Lipid Panel:   Lab Results   Component Value Date    CHOLSTRLTOT 111 02/23/2024    TRIGLYCERIDE 49 02/23/2024    HDL 52 02/23/2024    LDL 49 02/23/2024       Other Pertinent Blood Work:   Lab Results   Component Value Date    SODIUM 141 02/23/2024     POTASSIUM 4.1 02/23/2024    CHLORIDE 107 02/23/2024    CO2 24 02/23/2024    ANION 10.0 02/23/2024    GLUCOSE 112 (H) 02/23/2024    BUN 19 02/23/2024    CREATININE 0.74 02/23/2024    CALCIUM 9.1 02/23/2024    ASTSGOT 16 02/23/2024    ALTSGPT 18 02/23/2024    ALKPHOSPHAT 77 02/23/2024    TBILIRUBIN 0.7 02/23/2024    ALBUMIN 4.3 02/23/2024    AGRATIO 2.0 02/23/2024       Other:  NA    Recent Imaging Studies:    None since last visit          ASSESSMENT AND PLAN  Patient Type, check all that apply:   Secondary Prevention  Established Atherosclerotic Cardiovascular Disease (ASCVD)  Yes, Details: hx of MI + CABG in 2019  Other Established (non-atherosclerotic) Vascular Disease, if Present:    None  Evidence of Heterozygous Familial Hypercholesterolemia (FH): No  ACC/AHA Indication for Statin Therapy, wagner all that apply:   Established ASCVD: Indication for High intensity statin    Calculated Risk for ASCVD, if applicable:  N/A  Other Significant Risk Markers, if any, wagner all that apply:  Other: Elevated LipoA  National Lipid Association (NLA) Goal (if applicable):  LDL-C:   < 55 mg/dL  Lifestyle Recommendations From Today’s Visit:   Eating Plan: Concentrate on  Low sat/Trans fat, Low simple carb, and DASH/Med Style diet  Exercise: Continue with current exercise and increase as tolerable  Statin Recommendations from Today's Visit  Continue atorvastatin 40 mg daily  Non-Statin Medications Recommendations from Today’s Visit:   Continue Praluent 75 mg Q14 days  Indication for PCSK9 Inhibitor, if applicable:  ASCVD with suboptimal control of LDL-C despite maximally tolerated statin  Supplements Recommended at this visit:  Continue with CoQ10 and Vit D  Recommendations for Other Cardiovascular Risk Factors, wagner all that apply:   None.   Other Issues:  Patient's most recent labs indicate that LDLremains at goal of <55 mg/dL, though increased slightly from 32 to 49 mg/dL likely due to reduction of atorvastatin dose  Since  patient reduced his statin dose, he has been tolerating his lipid-lowering medications well  No changes indicated at this time  Patient would like repeat Lp(a) though discussed lack of clear benefit of repeating this test. Advised that levels have little to no influence from environmental or lifestyle factors and that it is still being studied whether lowering Lp(a) improves clinical outcomes. Patient opts to proceed with repeat lab regardless.    Studies Ordered at Todays Visit:  None     Blood Work Ordered At Today’s visit:   Lipid panel, CMP, Lp(a)    Follow-Up:   6 months    Noah Deras, PharmD, BCACP    Previous lipoprotein 131 mg/dL  Given established ASCVD and elevated lipoprotein a would target LDL less than 55 -likely patient remains below that goal  No evidence of valvular heart disease on echocardiogram    Michael J Bloch, MD  Certified Clinical Lipid Specialist  Medial Director, [facility]      CC:  Michael Bloch, MD

## 2024-04-01 ENCOUNTER — APPOINTMENT (RX ONLY)
Dept: URBAN - METROPOLITAN AREA CLINIC 4 | Facility: CLINIC | Age: 64
Setting detail: DERMATOLOGY
End: 2024-04-01

## 2024-04-01 DIAGNOSIS — A63.0 ANOGENITAL (VENEREAL) WARTS: ICD-10-CM

## 2024-04-01 PROCEDURE — RXMED WILLOW AMBULATORY MEDICATION CHARGE: Performed by: NURSE PRACTITIONER

## 2024-04-01 PROCEDURE — ? BENIGN DESTRUCTION (GENITALS)

## 2024-04-01 PROCEDURE — 54056 CRYOSURGERY PENIS LESION(S): CPT

## 2024-04-01 PROCEDURE — ? COUNSELING

## 2024-04-01 ASSESSMENT — LOCATION SIMPLE DESCRIPTION DERM
LOCATION SIMPLE: GROIN
LOCATION SIMPLE: PENIS

## 2024-04-01 ASSESSMENT — LOCATION DETAILED DESCRIPTION DERM
LOCATION DETAILED: SUPRAPUBIC SKIN
LOCATION DETAILED: RIGHT DORSAL SHAFT OF PENIS
LOCATION DETAILED: LEFT DORSAL SHAFT OF PENIS

## 2024-04-01 ASSESSMENT — LOCATION ZONE DERM
LOCATION ZONE: TRUNK
LOCATION ZONE: PENIS

## 2024-04-01 NOTE — PROCEDURE: BENIGN DESTRUCTION (GENITALS)
Warning: This plan will only bill for destructions on the Penis and Vulva. If you select the Scrotum it will not bill.
Medical Necessity Information: It is in your best interest to select a reason for this procedure from the list below. All of these items fulfill various CMS LCD requirements except the new and changing color options.
Anesthesia Volume In Cc: 0.5
Method: liquid nitrogen
Include Z78.9 (Other Specified Conditions Influencing Health Status) As An Associated Diagnosis?: No
Post-Care Instructions: I reviewed with the patient in detail post-care instructions. Patient is to wear sunprotection, and avoid picking at any of the treated lesions. Pt may apply Vaseline to crusted or scabbing areas.
Medical Necessity Clause: This procedure was medically necessary because the lesions that were treated were:
Consent: The patient's consent was obtained including but not limited to risks of crusting, scabbing, blistering, scarring, darker or lighter pigmentary change, recurrence, incomplete removal and infection.
Detail Level: Detailed

## 2024-04-03 ENCOUNTER — PHARMACY VISIT (OUTPATIENT)
Dept: PHARMACY | Facility: MEDICAL CENTER | Age: 64
End: 2024-04-03
Payer: COMMERCIAL

## 2024-05-16 ENCOUNTER — HOSPITAL ENCOUNTER (OUTPATIENT)
Dept: LAB | Facility: MEDICAL CENTER | Age: 64
End: 2024-05-16
Attending: INTERNAL MEDICINE
Payer: MEDICARE

## 2024-05-16 LAB
ALBUMIN SERPL BCP-MCNC: 4.6 G/DL (ref 3.2–4.9)
ALBUMIN/GLOB SERPL: 2.2 G/DL
ALP SERPL-CCNC: 81 U/L (ref 30–99)
ALT SERPL-CCNC: 41 U/L (ref 2–50)
ANION GAP SERPL CALC-SCNC: 15 MMOL/L (ref 7–16)
AST SERPL-CCNC: 28 U/L (ref 12–45)
BASOPHILS # BLD AUTO: 0.7 % (ref 0–1.8)
BASOPHILS # BLD: 0.05 K/UL (ref 0–0.12)
BILIRUB SERPL-MCNC: 0.9 MG/DL (ref 0.1–1.5)
BUN SERPL-MCNC: 18 MG/DL (ref 8–22)
CALCIUM ALBUM COR SERPL-MCNC: 8.8 MG/DL (ref 8.5–10.5)
CALCIUM SERPL-MCNC: 9.3 MG/DL (ref 8.5–10.5)
CHLORIDE SERPL-SCNC: 106 MMOL/L (ref 96–112)
CO2 SERPL-SCNC: 20 MMOL/L (ref 20–33)
CREAT SERPL-MCNC: 0.91 MG/DL (ref 0.5–1.4)
EOSINOPHIL # BLD AUTO: 0.15 K/UL (ref 0–0.51)
EOSINOPHIL NFR BLD: 2 % (ref 0–6.9)
ERYTHROCYTE [DISTWIDTH] IN BLOOD BY AUTOMATED COUNT: 42.8 FL (ref 35.9–50)
GFR SERPLBLD CREATININE-BSD FMLA CKD-EPI: 95 ML/MIN/1.73 M 2
GLOBULIN SER CALC-MCNC: 2.1 G/DL (ref 1.9–3.5)
GLUCOSE SERPL-MCNC: 97 MG/DL (ref 65–99)
HCT VFR BLD AUTO: 46.1 % (ref 42–52)
HGB BLD-MCNC: 16.5 G/DL (ref 14–18)
IMM GRANULOCYTES # BLD AUTO: 0.02 K/UL (ref 0–0.11)
IMM GRANULOCYTES NFR BLD AUTO: 0.3 % (ref 0–0.9)
LYMPHOCYTES # BLD AUTO: 1.35 K/UL (ref 1–4.8)
LYMPHOCYTES NFR BLD: 18 % (ref 22–41)
MCH RBC QN AUTO: 34 PG (ref 27–33)
MCHC RBC AUTO-ENTMCNC: 35.8 G/DL (ref 32.3–36.5)
MCV RBC AUTO: 94.9 FL (ref 81.4–97.8)
MONOCYTES # BLD AUTO: 0.52 K/UL (ref 0–0.85)
MONOCYTES NFR BLD AUTO: 6.9 % (ref 0–13.4)
NEUTROPHILS # BLD AUTO: 5.42 K/UL (ref 1.82–7.42)
NEUTROPHILS NFR BLD: 72.1 % (ref 44–72)
NRBC # BLD AUTO: 0 K/UL
NRBC BLD-RTO: 0 /100 WBC (ref 0–0.2)
PLATELET # BLD AUTO: 186 K/UL (ref 164–446)
PMV BLD AUTO: 11.4 FL (ref 9–12.9)
POTASSIUM SERPL-SCNC: 4 MMOL/L (ref 3.6–5.5)
PROT SERPL-MCNC: 6.7 G/DL (ref 6–8.2)
RBC # BLD AUTO: 4.86 M/UL (ref 4.7–6.1)
SODIUM SERPL-SCNC: 141 MMOL/L (ref 135–145)
WBC # BLD AUTO: 7.5 K/UL (ref 4.8–10.8)

## 2024-05-17 ENCOUNTER — HOSPITAL ENCOUNTER (OUTPATIENT)
Dept: RADIOLOGY | Facility: MEDICAL CENTER | Age: 64
End: 2024-05-17
Attending: INTERNAL MEDICINE
Payer: MEDICARE

## 2024-05-17 DIAGNOSIS — R14.0 BLOATING: ICD-10-CM

## 2024-05-17 DIAGNOSIS — R10.9 STOMACH ACHE: ICD-10-CM

## 2024-05-17 RX ADMIN — IOHEXOL 25 ML: 240 INJECTION, SOLUTION INTRATHECAL; INTRAVASCULAR; INTRAVENOUS; ORAL at 10:30

## 2024-05-17 RX ADMIN — IOHEXOL 100 ML: 350 INJECTION, SOLUTION INTRAVENOUS at 11:30

## 2024-05-22 ENCOUNTER — APPOINTMENT (OUTPATIENT)
Dept: RADIOLOGY | Facility: MEDICAL CENTER | Age: 64
End: 2024-05-22
Attending: INTERNAL MEDICINE
Payer: MEDICARE

## 2024-06-25 ENCOUNTER — PHARMACY VISIT (OUTPATIENT)
Dept: PHARMACY | Facility: MEDICAL CENTER | Age: 64
End: 2024-06-25
Payer: COMMERCIAL

## 2024-06-25 PROCEDURE — RXMED WILLOW AMBULATORY MEDICATION CHARGE: Performed by: NURSE PRACTITIONER

## 2024-06-25 PROCEDURE — RXMED WILLOW AMBULATORY MEDICATION CHARGE: Performed by: INTERNAL MEDICINE

## 2024-06-25 RX ORDER — ALBUTEROL SULFATE 90 UG/1
AEROSOL, METERED RESPIRATORY (INHALATION)
Qty: 18 G | Refills: 3 | OUTPATIENT
Start: 2024-06-25

## 2024-07-16 ENCOUNTER — PHARMACY VISIT (OUTPATIENT)
Dept: PHARMACY | Facility: MEDICAL CENTER | Age: 64
End: 2024-07-16
Payer: COMMERCIAL

## 2024-07-16 PROCEDURE — RXMED WILLOW AMBULATORY MEDICATION CHARGE: Performed by: PHYSICIAN ASSISTANT

## 2024-08-04 ENCOUNTER — HOSPITAL ENCOUNTER (OUTPATIENT)
Dept: RADIOLOGY | Facility: MEDICAL CENTER | Age: 64
End: 2024-08-04
Attending: INTERNAL MEDICINE
Payer: MEDICARE

## 2024-08-04 DIAGNOSIS — R10.84 ABDOMINAL PAIN, GENERALIZED: ICD-10-CM

## 2024-08-04 DIAGNOSIS — R14.0 ABDOMINAL BLOATING: ICD-10-CM

## 2024-08-04 PROCEDURE — 74183 MRI ABD W/O CNTR FLWD CNTR: CPT

## 2024-08-04 PROCEDURE — A9579 GAD-BASE MR CONTRAST NOS,1ML: HCPCS | Mod: JZ | Performed by: INTERNAL MEDICINE

## 2024-08-04 PROCEDURE — 700117 HCHG RX CONTRAST REV CODE 255: Mod: JZ | Performed by: INTERNAL MEDICINE

## 2024-08-04 RX ADMIN — GADOTERIDOL 19 ML: 279.3 INJECTION, SOLUTION INTRAVENOUS at 16:02

## 2024-08-26 ENCOUNTER — NON-PROVIDER VISIT (OUTPATIENT)
Dept: CARDIOLOGY | Facility: MEDICAL CENTER | Age: 64
End: 2024-08-26
Attending: INTERNAL MEDICINE
Payer: MEDICARE

## 2024-08-26 ENCOUNTER — HOSPITAL ENCOUNTER (OUTPATIENT)
Dept: LAB | Facility: MEDICAL CENTER | Age: 64
End: 2024-08-26
Attending: INTERNAL MEDICINE
Payer: MEDICARE

## 2024-08-26 VITALS
DIASTOLIC BLOOD PRESSURE: 85 MMHG | BODY MASS INDEX: 27.22 KG/M2 | HEART RATE: 58 BPM | SYSTOLIC BLOOD PRESSURE: 136 MMHG | WEIGHT: 212 LBS

## 2024-08-26 DIAGNOSIS — I24.9 ACS (ACUTE CORONARY SYNDROME) (HCC): ICD-10-CM

## 2024-08-26 DIAGNOSIS — E78.2 MIXED HYPERLIPIDEMIA: ICD-10-CM

## 2024-08-26 DIAGNOSIS — I25.10 CORONARY ARTERY DISEASE INVOLVING NATIVE CORONARY ARTERY OF NATIVE HEART WITHOUT ANGINA PECTORIS: ICD-10-CM

## 2024-08-26 DIAGNOSIS — E78.41 ELEVATED LIPOPROTEIN(A): ICD-10-CM

## 2024-08-26 LAB
ALBUMIN SERPL BCP-MCNC: 4.2 G/DL (ref 3.2–4.9)
ALBUMIN/GLOB SERPL: 2.1 G/DL
ALP SERPL-CCNC: 77 U/L (ref 30–99)
ALT SERPL-CCNC: 16 U/L (ref 2–50)
ANION GAP SERPL CALC-SCNC: 9 MMOL/L (ref 7–16)
AST SERPL-CCNC: 13 U/L (ref 12–45)
BILIRUB SERPL-MCNC: 0.4 MG/DL (ref 0.1–1.5)
BUN SERPL-MCNC: 26 MG/DL (ref 8–22)
CALCIUM ALBUM COR SERPL-MCNC: 8.7 MG/DL (ref 8.5–10.5)
CALCIUM SERPL-MCNC: 8.9 MG/DL (ref 8.5–10.5)
CHLORIDE SERPL-SCNC: 108 MMOL/L (ref 96–112)
CHOLEST SERPL-MCNC: 149 MG/DL (ref 100–199)
CO2 SERPL-SCNC: 24 MMOL/L (ref 20–33)
CREAT SERPL-MCNC: 0.79 MG/DL (ref 0.5–1.4)
FASTING STATUS PATIENT QL REPORTED: NORMAL
GFR SERPLBLD CREATININE-BSD FMLA CKD-EPI: 100 ML/MIN/1.73 M 2
GLOBULIN SER CALC-MCNC: 2 G/DL (ref 1.9–3.5)
GLUCOSE SERPL-MCNC: 110 MG/DL (ref 65–99)
HDLC SERPL-MCNC: 54 MG/DL
LDLC SERPL CALC-MCNC: 80 MG/DL
POTASSIUM SERPL-SCNC: 4.1 MMOL/L (ref 3.6–5.5)
PROT SERPL-MCNC: 6.2 G/DL (ref 6–8.2)
SODIUM SERPL-SCNC: 141 MMOL/L (ref 135–145)
TRIGL SERPL-MCNC: 73 MG/DL (ref 0–149)

## 2024-08-26 PROCEDURE — 36415 COLL VENOUS BLD VENIPUNCTURE: CPT

## 2024-08-26 PROCEDURE — 83695 ASSAY OF LIPOPROTEIN(A): CPT

## 2024-08-26 PROCEDURE — 80061 LIPID PANEL: CPT

## 2024-08-26 PROCEDURE — RXMED WILLOW AMBULATORY MEDICATION CHARGE: Performed by: INTERNAL MEDICINE

## 2024-08-26 PROCEDURE — 99212 OFFICE O/P EST SF 10 MIN: CPT | Performed by: INTERNAL MEDICINE

## 2024-08-26 PROCEDURE — 80053 COMPREHEN METABOLIC PANEL: CPT

## 2024-08-26 RX ORDER — ATORVASTATIN CALCIUM 40 MG/1
40 TABLET, FILM COATED ORAL NIGHTLY
Qty: 100 TABLET | Refills: 3 | Status: SHIPPED | OUTPATIENT
Start: 2024-08-26

## 2024-08-26 ASSESSMENT — FIBROSIS 4 INDEX: FIB4 SCORE: 1.48

## 2024-08-26 NOTE — PROGRESS NOTES
[facility] - Follow Up Visit  Date of Service: 08/26/24    Anthony Reyes presents for follow up of dyslipidemia    Referral Source: Dr. Thompson  Date First Seen in Clinic 8/19/2021    PERTINENT HLD PMHX  Age at Initial Diagnosis of Dyslipidemia: Unknown    Baseline Lipids Prior to Treatment:   Lab Results   Component Value Date/Time    CHOLSTRLTOT 111 02/23/2024 06:51 AM    LDL 49 02/23/2024 06:51 AM    HDL 52 02/23/2024 06:51 AM    TRIGLYCERIDE 49 02/23/2024 06:51 AM       History of ASCVD: Documented clinical evidence of ASCVD: Hx of CABG & MI  Other Established (non-atherosclerotic) Vascular Disease, if Present: None  Secondary causes of dyslipidemia:  Endocrine/Hypothyroidism:  none reported   Liver disease: none reported   Renal disease/nephrotic syndrome:  none reported  Dietary-induced (ketogenic, lean mass hyper-responder)? no  Medications: None  Previously Attempted Interventions for Lipids - including outcome  Statin: Atorvastatin 80 mg once daily     Outcome: myalgias, arthralgia, migraines - prompted ED visit  Non-Statin: none  Outcome: not applicable    CURRENT MED MGMT  Current Lipid Lowering Meds:   Statin: Atorvastatin 40 mg once daily  Non-Statin: Praluent 75 mg subQ Q14D  Supplements: None  Current adverse drug reactions/side effects? None since decreasing from 80 mg to 40 mg   Is Adherence an Issue? Typically no    LIFESTYLE MGMT  Change in weight:  Up ~ 7 lbs since last visit  Exercise habits: moderate regular exercise program - Walking 2 miles daily most days of the week. Pt does construction and manual labor job daily.   Dietary patterns: common adult; patient had cut out sugar, concentrating on lean meats, veggies and eats red meat about once a week, trying to cook more at home. More chicken, fish, salads, fruit/vegetables.  Etoh: see sochx  Barriers to care/SDOH: none  Tobacco:  reports that he has never smoked. He quit smokeless tobacco use about 44 years ago.     Patient  Active Problem List   Diagnosis    ACS (acute coronary syndrome) (HCC)    Ischemic heart disease due to coronary artery obstruction (HCC)    Hx of CABG    Mixed hyperlipidemia    Ischemic cardiomyopathy    High risk medication use    SOB (shortness of breath)    Elevated lipoprotein(a)     Current Outpatient Medications on File Prior to Visit   Medication Sig Dispense Refill    albuterol (VENTOLIN HFA) 108 (90 Base) MCG/ACT Aero Soln inhalation aerosol Inhale 1 puff by mouth every 6 hours as needed 18 g 3    dicyclomine (BENTYL) 20 MG Tab Take 20 mg tablet by mouth every 6 hours as needed for  lower abdominal cramping. 120 Tablet 1    omeprazole (PRILOSEC) 20 MG delayed-release capsule Take 20 mg capsule by mouth 2 times a day for 30 day; then daily 180 Capsule 0    Cyanocobalamin (VITAMIN B-12 PO) Take  by mouth every day.      Menaquinone-7 (VITAMIN K2 PO) Take 2 Tablets by mouth every day.      VITAMIN D, CHOLECALCIFEROL, PO Take  by mouth every day.      atorvastatin (LIPITOR) 80 MG tablet Take 1 Tablet by mouth every day. (Patient taking differently: Take 40 mg by mouth every day.) 90 Tablet 3    Alirocumab 75 MG/ML Solution Auto-injector Inject 75 mg (1 pen) under the skin every 14 days. 6 mL 3    enalapril (VASOTEC) 5 MG Tab Take 1 Tablet by mouth every day. 90 Tablet 3    omeprazole (PRILOSEC) 40 MG delayed-release capsule Take 1 Capsule by mouth every evening. 90 Capsule 3    coenzyme Q-10 30 MG capsule Take 60 mg by mouth every day.      ibuprofen (MOTRIN) 800 MG Tab Take 1 Tablet by mouth every 8 hours as needed for Mild Pain. 30 Tablet 0    Ascorbic Acid (VITAMIN C) 1000 MG Tab Take 1,000 mg by mouth every day.      Omega-3 Fatty Acids (FISH OIL) 1000 MG Cap capsule Take 1,000 mg by mouth every day.      aspirin EC 81 MG EC tablet Take 1 Tab by mouth every day. 30 Tab      No current facility-administered medications on file prior to visit.      No Known Allergies   Family History   Problem Relation Age  "of Onset    Heart Disease Maternal Uncle 54        CABG       There were no vitals filed for this visit.   BMI Readings from Last 1 Encounters:   02/26/24 26.42 kg/m²      Wt Readings from Last 3 Encounters:   02/26/24 93.4 kg (205 lb 12.8 oz)   11/16/23 93 kg (205 lb)   08/07/23 91.6 kg (202 lb)     BP Readings from Last 2 Encounters:   02/26/24 120/80   11/16/23 (!) 153/86       DATA REVIEW:  Most Recent Lipid Panel:   Lab Results   Component Value Date/Time    CHOLSTRLTOT 111 02/23/2024 06:51 AM    LDL 49 02/23/2024 06:51 AM    HDL 52 02/23/2024 06:51 AM    TRIGLYCERIDE 49 02/23/2024 06:51 AM     Lab Results   Component Value Date/Time    LDL 49 02/23/2024 06:51 AM    LDL 32 08/04/2023 01:28 PM    LDL 78 06/12/2023 09:13 AM      Lab Results   Component Value Date/Time    LIPOPROTA 131 (H) 01/29/2021 12:16 PM      No results found for: \"APOB\"   No results found for: \"CRPHIGHSEN\"    Other Pertinent Blood Work:   Lab Results   Component Value Date    SODIUM 141 05/16/2024    POTASSIUM 4.0 05/16/2024    CHLORIDE 106 05/16/2024    CO2 20 05/16/2024    ANION 15.0 05/16/2024    GLUCOSE 97 05/16/2024    BUN 18 05/16/2024    CREATININE 0.91 05/16/2024    CALCIUM 9.3 05/16/2024    ASTSGOT 28 05/16/2024    ALTSGPT 41 05/16/2024    ALKPHOSPHAT 81 05/16/2024    TBILIRUBIN 0.9 05/16/2024    ALBUMIN 4.6 05/16/2024    AGRATIO 2.2 05/16/2024     @lastgfr@  VASCULAR IMAGING:  CAC Score (if available) - Not available  CIMT/Vascular screen (if available): Not available  Other (if applicable) - N/a    ASSESSMENT AND PLAN    Patient Type, check all that apply: Secondary Prevention    Major ASCVD events: History of prior MI >12 months ago  and Documented clinical evidence of ASCVD:  hx of CABG    Established (non-atherosclerotic) Vascular Disease: None    Secondary causes/contributors: N\A    Evidence of genetic dyslipidemia: No     ACC/AHA Indication for Statin Therapy:  Established ASCVD: Indication for High intensity statin "     ASCVD risk calculations  The ASCVD Risk score (Yesi DILLARD, et al., 2019) failed to calculate., N/A    Other Significant Risk Markers:  High-risk conditions: Prior CABG or PCI outside of the major ASCVD event(s)   Risk-enhancers:    Lipoprotein(a): Very High (>100 mg/dl or >225 nmol/L)    Goal LDL-C and ApolipoproteinB/non-HDL-C:  Primary: LDL-C <55 mg/dl  Secondary apoB <60 mg/dl  At goals? No    LIFESTYLE INTERVENTIONS  TOBACCO:  reports that he has never smoked. He quit smokeless tobacco use about 44 years ago.   - continued complete avoidance of all tobacco products   PHYSICAL ACTIVITY: at least 150 min per week of moderate intensity  NUTRITION: Mediterranean and DASH    ETOH: complete abstinence recommended  WT MGMT:  Work to decrease    LIPID-LOWERING MEDICATION MANAGEMENT:     Statin Therapy:   Continue atorvastatin 40 mg once daily    Non-Statin Meds:   EZETIMIBE: not currently indicated   NEXLETOL/NEXLIZET (avoid simva >20, prava >40): not currently indicated   BAS: not currently indicated   OMEGA-3 FAs: not currently indicated   FIBRATE:  not indicated   PCSK9 mAb: Praluent 75 mg subQ Q14D  LEQVIO: not indicated     PCSK9 Inhibitor strategy indications: ASCVD with suboptimal control of LDL-C despite maximally tolerated statin    Recommended Supplements: CoQ10 and vitamin D     Studies Ordered at Todays Visit: None   Blood Work To Be Obtained Prior to Next Visit:  CMP, lipid panel, and apoB  Follow-Up: 6 months    Tirso Grijalva, PharmD, BCACP    CC:  FRANCHESKA Guerrero Dr.

## 2024-08-28 LAB — LPA SERPL-MCNC: 118 MG/DL

## 2024-08-29 RX ORDER — ALIROCUMAB 150 MG/ML
1 INJECTION, SOLUTION SUBCUTANEOUS
Qty: 2 ML | Refills: 5 | Status: SHIPPED | OUTPATIENT
Start: 2024-08-29

## 2024-08-29 NOTE — PROGRESS NOTES
LDL above goal - pt amenable to optimization of Praluent dose. Will increase up to 150 mg subQ Q14D.    FU ~ 8 weeks after dose increase w/ repeat labs.    Tirso Grijalva, PharmD, BCACP

## 2024-08-30 PROCEDURE — RXMED WILLOW AMBULATORY MEDICATION CHARGE: Performed by: INTERNAL MEDICINE

## 2024-09-06 ENCOUNTER — PHARMACY VISIT (OUTPATIENT)
Dept: PHARMACY | Facility: MEDICAL CENTER | Age: 64
End: 2024-09-06
Payer: COMMERCIAL

## 2024-10-01 ENCOUNTER — PHARMACY VISIT (OUTPATIENT)
Dept: PHARMACY | Facility: MEDICAL CENTER | Age: 64
End: 2024-10-01
Payer: COMMERCIAL

## 2024-10-01 ENCOUNTER — OFFICE VISIT (OUTPATIENT)
Dept: CARDIOLOGY | Facility: MEDICAL CENTER | Age: 64
End: 2024-10-01
Attending: INTERNAL MEDICINE
Payer: MEDICARE

## 2024-10-01 VITALS
HEIGHT: 74 IN | SYSTOLIC BLOOD PRESSURE: 130 MMHG | WEIGHT: 208 LBS | BODY MASS INDEX: 26.69 KG/M2 | RESPIRATION RATE: 18 BRPM | DIASTOLIC BLOOD PRESSURE: 80 MMHG | HEART RATE: 79 BPM | OXYGEN SATURATION: 98 %

## 2024-10-01 DIAGNOSIS — E78.41 ELEVATED LIPOPROTEIN(A): ICD-10-CM

## 2024-10-01 DIAGNOSIS — I24.0 ISCHEMIC HEART DISEASE DUE TO CORONARY ARTERY OBSTRUCTION (HCC): ICD-10-CM

## 2024-10-01 DIAGNOSIS — I25.9 ISCHEMIC HEART DISEASE DUE TO CORONARY ARTERY OBSTRUCTION (HCC): ICD-10-CM

## 2024-10-01 DIAGNOSIS — R06.02 SOB (SHORTNESS OF BREATH): ICD-10-CM

## 2024-10-01 DIAGNOSIS — Z79.899 HIGH RISK MEDICATION USE: ICD-10-CM

## 2024-10-01 DIAGNOSIS — E78.2 MIXED HYPERLIPIDEMIA: ICD-10-CM

## 2024-10-01 DIAGNOSIS — I10 ESSENTIAL HYPERTENSION, BENIGN: ICD-10-CM

## 2024-10-01 DIAGNOSIS — Z95.1 HX OF CABG: ICD-10-CM

## 2024-10-01 DIAGNOSIS — Z78.9 STATIN INTOLERANCE: ICD-10-CM

## 2024-10-01 DIAGNOSIS — I25.5 ISCHEMIC CARDIOMYOPATHY: ICD-10-CM

## 2024-10-01 DIAGNOSIS — I25.10 CORONARY ARTERY DISEASE INVOLVING NATIVE CORONARY ARTERY OF NATIVE HEART WITHOUT ANGINA PECTORIS: ICD-10-CM

## 2024-10-01 PROCEDURE — 3075F SYST BP GE 130 - 139MM HG: CPT | Performed by: INTERNAL MEDICINE

## 2024-10-01 PROCEDURE — 99214 OFFICE O/P EST MOD 30 MIN: CPT | Performed by: INTERNAL MEDICINE

## 2024-10-01 PROCEDURE — 3079F DIAST BP 80-89 MM HG: CPT | Performed by: INTERNAL MEDICINE

## 2024-10-01 PROCEDURE — RXMED WILLOW AMBULATORY MEDICATION CHARGE: Performed by: INTERNAL MEDICINE

## 2024-10-01 PROCEDURE — G2211 COMPLEX E/M VISIT ADD ON: HCPCS | Performed by: INTERNAL MEDICINE

## 2024-10-01 PROCEDURE — 99213 OFFICE O/P EST LOW 20 MIN: CPT | Performed by: INTERNAL MEDICINE

## 2024-10-01 RX ORDER — ENALAPRIL MALEATE 10 MG/1
10 TABLET ORAL DAILY
Qty: 100 TABLET | Refills: 3 | Status: SHIPPED | OUTPATIENT
Start: 2024-10-01

## 2024-10-01 ASSESSMENT — ENCOUNTER SYMPTOMS
DECREASED APPETITE: 0
VOMITING: 0
PALPITATIONS: 0
SYNCOPE: 0
PND: 0
DEPRESSION: 0
ALTERED MENTAL STATUS: 0
DIZZINESS: 0
BACK PAIN: 0
BLURRED VISION: 0
FLANK PAIN: 0
CLAUDICATION: 0
IRREGULAR HEARTBEAT: 0
COUGH: 0
DIARRHEA: 0
NAUSEA: 0
ORTHOPNEA: 0
ABDOMINAL PAIN: 0
NEAR-SYNCOPE: 0
WEIGHT LOSS: 0
FEVER: 0
SHORTNESS OF BREATH: 0
WEIGHT GAIN: 0
HEARTBURN: 0
CONSTIPATION: 0
DYSPNEA ON EXERTION: 0

## 2024-10-01 ASSESSMENT — FIBROSIS 4 INDEX: FIB4 SCORE: 1.1

## 2024-10-17 ENCOUNTER — HOSPITAL ENCOUNTER (OUTPATIENT)
Dept: LAB | Facility: MEDICAL CENTER | Age: 64
End: 2024-10-17
Attending: INTERNAL MEDICINE
Payer: MEDICARE

## 2024-10-17 DIAGNOSIS — I24.9 ACS (ACUTE CORONARY SYNDROME) (HCC): ICD-10-CM

## 2024-10-17 DIAGNOSIS — E78.2 MIXED HYPERLIPIDEMIA: ICD-10-CM

## 2024-10-17 LAB
ALBUMIN SERPL BCP-MCNC: 4.2 G/DL (ref 3.2–4.9)
ALBUMIN/GLOB SERPL: 2.1 G/DL
ALP SERPL-CCNC: 77 U/L (ref 30–99)
ALT SERPL-CCNC: 9 U/L (ref 2–50)
ANION GAP SERPL CALC-SCNC: 11 MMOL/L (ref 7–16)
AST SERPL-CCNC: 11 U/L (ref 12–45)
BILIRUB SERPL-MCNC: 0.6 MG/DL (ref 0.1–1.5)
BUN SERPL-MCNC: 16 MG/DL (ref 8–22)
CALCIUM ALBUM COR SERPL-MCNC: 9.4 MG/DL (ref 8.5–10.5)
CALCIUM SERPL-MCNC: 9.6 MG/DL (ref 8.5–10.5)
CHLORIDE SERPL-SCNC: 108 MMOL/L (ref 96–112)
CHOLEST SERPL-MCNC: 149 MG/DL (ref 100–199)
CO2 SERPL-SCNC: 24 MMOL/L (ref 20–33)
CREAT SERPL-MCNC: 0.79 MG/DL (ref 0.5–1.4)
FASTING STATUS PATIENT QL REPORTED: NORMAL
GFR SERPLBLD CREATININE-BSD FMLA CKD-EPI: 100 ML/MIN/1.73 M 2
GLOBULIN SER CALC-MCNC: 2 G/DL (ref 1.9–3.5)
GLUCOSE SERPL-MCNC: 119 MG/DL (ref 65–99)
HDLC SERPL-MCNC: 55 MG/DL
LDLC SERPL CALC-MCNC: 81 MG/DL
POTASSIUM SERPL-SCNC: 4.1 MMOL/L (ref 3.6–5.5)
PROT SERPL-MCNC: 6.2 G/DL (ref 6–8.2)
SODIUM SERPL-SCNC: 143 MMOL/L (ref 135–145)
TRIGL SERPL-MCNC: 67 MG/DL (ref 0–149)

## 2024-10-17 PROCEDURE — 82172 ASSAY OF APOLIPOPROTEIN: CPT

## 2024-10-17 PROCEDURE — 80061 LIPID PANEL: CPT

## 2024-10-17 PROCEDURE — 36415 COLL VENOUS BLD VENIPUNCTURE: CPT

## 2024-10-17 PROCEDURE — 80053 COMPREHEN METABOLIC PANEL: CPT

## 2024-10-21 LAB — APO B100 SERPL-MCNC: 72 MG/DL (ref 66–133)

## 2024-11-07 ENCOUNTER — PHARMACY VISIT (OUTPATIENT)
Dept: PHARMACY | Facility: MEDICAL CENTER | Age: 64
End: 2024-11-07
Payer: COMMERCIAL

## 2024-11-07 ENCOUNTER — NON-PROVIDER VISIT (OUTPATIENT)
Dept: CARDIOLOGY | Facility: MEDICAL CENTER | Age: 64
End: 2024-11-07
Attending: INTERNAL MEDICINE
Payer: MEDICARE

## 2024-11-07 VITALS
WEIGHT: 207.6 LBS | DIASTOLIC BLOOD PRESSURE: 83 MMHG | BODY MASS INDEX: 26.65 KG/M2 | HEART RATE: 67 BPM | SYSTOLIC BLOOD PRESSURE: 132 MMHG

## 2024-11-07 DIAGNOSIS — E78.41 ELEVATED LIPOPROTEIN(A): ICD-10-CM

## 2024-11-07 DIAGNOSIS — E78.2 MIXED HYPERLIPIDEMIA: ICD-10-CM

## 2024-11-07 DIAGNOSIS — I25.10 CORONARY ARTERY DISEASE INVOLVING NATIVE CORONARY ARTERY OF NATIVE HEART WITHOUT ANGINA PECTORIS: ICD-10-CM

## 2024-11-07 DIAGNOSIS — I24.9 ACS (ACUTE CORONARY SYNDROME) (HCC): ICD-10-CM

## 2024-11-07 PROCEDURE — 99212 OFFICE O/P EST SF 10 MIN: CPT

## 2024-11-07 PROCEDURE — RXMED WILLOW AMBULATORY MEDICATION CHARGE: Performed by: INTERNAL MEDICINE

## 2024-11-07 RX ORDER — ALIROCUMAB 150 MG/ML
1 INJECTION, SOLUTION SUBCUTANEOUS
Qty: 2 ML | Refills: 5 | Status: SHIPPED | OUTPATIENT
Start: 2024-11-07

## 2024-11-07 ASSESSMENT — FIBROSIS 4 INDEX: FIB4 SCORE: 1.24

## 2024-11-07 NOTE — PATIENT INSTRUCTIONS
INCREASE your Praluent up to 150 mg every 2 weeks    Go get repeat cholesterol labs around the week of January 20th, 2025

## 2024-11-07 NOTE — PROGRESS NOTES
[facility]  Date of Service: 11/07/24    Anthonysamy Lorenz Amy presents for management of dyslipidemia    Referral Source: Dr. Thompson  Date First Seen in Clinic: 12/9/22    PERTINENT HLD PMHX  Age at Initial Diagnosis of Dyslipidemia: Unknown      Baseline Lipids Prior to Treatment:    Latest Reference Range & Units 01/29/21 12:16   Cholesterol,Tot 100 - 199 mg/dL 167   Triglycerides 0 - 149 mg/dL 75   HDL >=40 mg/dL 50   LDL <100 mg/dL 102 (H)   Lipoprotein (a) <=29 mg/dL 131 (H)   (H): Data is abnormally high    History of ASCVD: Hx of CABG & MI     Previously Attempted Interventions for Lipids - including outcome  Statin: Atorvastatin 40 mg & 80 mg once daily                           Outcome: myalgias, arthralgia, migraines - prompted ED visit  Non-Statin: none  Outcome: not applicable    Secondary causes/contributors (report to medical director if present):   Endocrine/Hypothyroidism:  none reported   Liver disease: none reported   Renal disease/nephrotic syndrome:  none reported  Dietary-induced (ketogenic, lean mass hyper-responder)? no  Medications: None    CURRENT MED MGMT  Current Lipid Lowering Meds:   Statin: None - Dc'd since last visit per cardiology d/t above SE (even w/ lower dose)  Non-Statin: Praluent 150 mg subQ once every 2 weeks - pt has been using ONCE monthly  Supplements: None  Current adverse drug reactions/side effects? no  Is Adherence an Issue? Yes - pt was confused and not administering Praluent as instructed.    Any Family History Cardiovascular Disease? no    There were no vitals filed for this visit.   BMI Readings from Last 1 Encounters:   10/01/24 26.71 kg/m²      Wt Readings from Last 3 Encounters:   10/01/24 94.3 kg (208 lb)   08/26/24 96.2 kg (212 lb)   02/26/24 93.4 kg (205 lb 12.8 oz)     BP Readings from Last 2 Encounters:   10/01/24 130/80   08/26/24 136/85       DATA REVIEW:  Most Recent Lipid Panel:   Lab Results   Component Value Date/Time    CHOLSTRLTOT 149  "10/17/2024 07:47 AM    LDL 81 10/17/2024 07:47 AM    HDL 55 10/17/2024 07:47 AM    TRIGLYCERIDE 67 10/17/2024 07:47 AM     Lab Results   Component Value Date/Time    LDL 81 10/17/2024 07:47 AM    LDL 80 08/26/2024 06:48 AM    LDL 49 02/23/2024 06:51 AM      Lab Results   Component Value Date/Time    LIPOPROTA 118 (H) 08/26/2024 06:48 AM      Lab Results   Component Value Date/Time    APOB 72 10/17/2024 07:47 AM      No results found for: \"CRPHIGHSEN\"    Other Pertinent Blood Work:   Lab Results   Component Value Date    SODIUM 143 10/17/2024    POTASSIUM 4.1 10/17/2024    CHLORIDE 108 10/17/2024    CO2 24 10/17/2024    ANION 11.0 10/17/2024    GLUCOSE 119 (H) 10/17/2024    BUN 16 10/17/2024    CREATININE 0.79 10/17/2024    CALCIUM 9.6 10/17/2024    ASTSGOT 11 (L) 10/17/2024    ALTSGPT 9 10/17/2024    ALKPHOSPHAT 77 10/17/2024    TBILIRUBIN 0.6 10/17/2024    ALBUMIN 4.2 10/17/2024    AGRATIO 2.1 10/17/2024       VASCULAR IMAGING:  CAC Score (if available): None  CIMT/Vascular screen (if available): None  Other (if applicable): N/a    ASSESSMENT AND PLAN    Patient Type, check all that apply: Secondary Prevention    Major ASCVD events:  History of prior MI >12 months ago  and Documented clinical evidence of ASCVD:  hx of CABG       Evidence of genetic dyslipidemia (Familial Hyperlipidemia): No     ASCVD risk calculations (if applicable)  The 10-year ASCVD risk score (Yesi DILLARD, et al., 2019) is: 9.1%, N/A    ACC/AHA Indication for Statin Therapy:  Established ASCVD: Indication for High intensity statin     Other Significant Risk Markers:  High-risk conditions: Hypertension   Risk-enhancers: Lp(a) >50mg/dL (>125 nmol/L)  Lipoprotein(a): Very High (>100 mg/dl or >225 nmol/L  Most recent CAC (if available): None    Lipid Goals:  Primary: LDL-C <70 mg/dl per cards  Secondary apoB <70 mg/dl  At goals? no    LIFESTYLE INTERVENTIONS  TOBACCO: None  continued complete avoidance of all tobacco products   EtOH: Avoids  Men: No " more than two standard servings per day  Women: No more than one standard serving per day  PHYSICAL ACTIVITY: at least 150 min per week of moderate intensity - Has been walking less w/ cooler weather. Doing more weight training.  NUTRITION: Mediterranean and DASH  - eating eggs, potatoes, chicken, vegetables    LIPID-LOWERING MEDICATION MANAGEMENT:     Statin Therapy:   Would not rechallenge at this time due to hx of intolerance    PCSK9 Inhibitor strategy indications: ASCVD with suboptimal control of LDL-C despite maximally tolerated statin    Non-Statin Meds:   EZETIMIBE: Not currently indicated  NEXLETOL/NEXLIZET (avoid simva >20, prava >40): Not currently indicated  BAS: Not currently indicated    OMEGA-3 FAs: Continue fish oil 1g once daily  FIBRATE:  Not currently indicated  PCSK9 mAb: INCREASE Praluent to 150 mg subQ Q14D  LEQVIO: Not currently indicated    Recommended Supplements: None     Studies Ordered at Todays Visit: None   Blood Work To Be Obtained Prior to Next Visit: Lipid panel, CMP, and ApoB  Follow-Up: 3 months    Tirso Grijalva, PharmD     CC:  Matteo Berrios P.A.-C.

## 2024-11-26 ENCOUNTER — TELEPHONE (OUTPATIENT)
Dept: CARDIOLOGY | Facility: MEDICAL CENTER | Age: 64
End: 2024-11-26
Payer: MEDICARE

## 2024-11-26 NOTE — TELEPHONE ENCOUNTER
Called Pt, no answer, left voicemail.  Reminded Pt of upcoming appointment and let him know that he needs to get some labs done before then. Did say he needs to be fasting before getting them done, I will also mail out the orders to Pt address since he lives out in [facility]. Provided phone number in case of any questions.

## 2024-12-12 ENCOUNTER — HOSPITAL ENCOUNTER (OUTPATIENT)
Dept: LAB | Facility: MEDICAL CENTER | Age: 64
End: 2024-12-12
Attending: INTERNAL MEDICINE
Payer: MEDICARE

## 2024-12-12 DIAGNOSIS — I25.10 CORONARY ARTERY DISEASE INVOLVING NATIVE CORONARY ARTERY OF NATIVE HEART WITHOUT ANGINA PECTORIS: ICD-10-CM

## 2024-12-12 DIAGNOSIS — E78.2 MIXED HYPERLIPIDEMIA: ICD-10-CM

## 2024-12-12 DIAGNOSIS — E78.41 ELEVATED LIPOPROTEIN(A): ICD-10-CM

## 2024-12-12 DIAGNOSIS — I24.9 ACS (ACUTE CORONARY SYNDROME) (HCC): ICD-10-CM

## 2024-12-12 PROCEDURE — 82172 ASSAY OF APOLIPOPROTEIN: CPT

## 2024-12-12 PROCEDURE — 80061 LIPID PANEL: CPT

## 2024-12-12 PROCEDURE — 36415 COLL VENOUS BLD VENIPUNCTURE: CPT

## 2024-12-12 PROCEDURE — 80053 COMPREHEN METABOLIC PANEL: CPT

## 2024-12-13 LAB
ALBUMIN SERPL BCP-MCNC: 4.2 G/DL (ref 3.2–4.9)
ALBUMIN/GLOB SERPL: 1.8 G/DL
ALP SERPL-CCNC: 87 U/L (ref 30–99)
ALT SERPL-CCNC: 17 U/L (ref 2–50)
ANION GAP SERPL CALC-SCNC: 11 MMOL/L (ref 7–16)
AST SERPL-CCNC: 17 U/L (ref 12–45)
BILIRUB SERPL-MCNC: 0.4 MG/DL (ref 0.1–1.5)
BUN SERPL-MCNC: 26 MG/DL (ref 8–22)
CALCIUM ALBUM COR SERPL-MCNC: 9.2 MG/DL (ref 8.5–10.5)
CALCIUM SERPL-MCNC: 9.4 MG/DL (ref 8.5–10.5)
CHLORIDE SERPL-SCNC: 107 MMOL/L (ref 96–112)
CHOLEST SERPL-MCNC: 155 MG/DL (ref 100–199)
CO2 SERPL-SCNC: 21 MMOL/L (ref 20–33)
CREAT SERPL-MCNC: 0.88 MG/DL (ref 0.5–1.4)
GFR SERPLBLD CREATININE-BSD FMLA CKD-EPI: 96 ML/MIN/1.73 M 2
GLOBULIN SER CALC-MCNC: 2.3 G/DL (ref 1.9–3.5)
GLUCOSE SERPL-MCNC: 114 MG/DL (ref 65–99)
HDLC SERPL-MCNC: 50 MG/DL
LDLC SERPL CALC-MCNC: 85 MG/DL
POTASSIUM SERPL-SCNC: 4.2 MMOL/L (ref 3.6–5.5)
PROT SERPL-MCNC: 6.5 G/DL (ref 6–8.2)
SODIUM SERPL-SCNC: 139 MMOL/L (ref 135–145)
TRIGL SERPL-MCNC: 102 MG/DL (ref 0–149)

## 2024-12-14 LAB — APO B100 SERPL-MCNC: 79 MG/DL (ref 66–133)

## 2024-12-17 NOTE — PROGRESS NOTES
Chief Complaint   Patient presents with    Hyperlipidemia    Hypertension     F/V Dx: Essential hypertension, benign    Coronary Artery Disease     F/V Dx: Coronary artery disease involving native coronary artery of native heart without angina pectoris          Subjective:   Anthony Reyes is a 64 y.o. male who presents today for follow-up.     Patient of Dr. Thompson.  Current medical problems include coronary artery disease with history of CABG, hypertension, hyperlipidemia with statin intolerance.    Here for follow-up after he is increased the enalapril for blood pressure control.    Overall Anthony is feeling well, especially since he stopped the statin. He has used the 150mg injection of praluent twice. No injection reactions.     His home readings are about 135/84. He does add salt to the meals he cooks at home. He uses NSAIDs sparingly. He does not drink. 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     He expresses anxiety over the amount of medications he is taking and wonders if he will ever be able to come off of these.    Past Medical History:   Diagnosis Date    CAD (coronary artery disease) 02/03/2017    CABGx3         Family History   Problem Relation Age of Onset    Heart Disease Maternal Uncle 54        CABG         Social History     Tobacco Use    Smoking status: Never    Smokeless tobacco: Former     Quit date: 1980    Tobacco comments:     Chewed for 5 years   Vaping Use    Vaping status: Never Used   Substance Use Topics    Alcohol use: No    Drug use: No         Allergies   Allergen Reactions    Atorvastatin Myalgia         Current Outpatient Medications   Medication Sig    enalapril (VASOTEC) 10 MG Tab Take 1 Tablet by mouth every day.    Alirocumab (PRALUENT) 150 MG/ML Solution Auto-injector Inject 1 mL under the skin every 14 days. (Patient taking differently: Inject 1 mL under the skin every 14 days. Once every 30 days)    albuterol (VENTOLIN HFA) 108 (90 Base)  "MCG/ACT Aero Soln inhalation aerosol Inhale 1 puff by mouth every 6 hours as needed    Cyanocobalamin (VITAMIN B-12 PO) Take  by mouth every day.    VITAMIN D, CHOLECALCIFEROL, PO Take  by mouth every day.    omeprazole (PRILOSEC) 40 MG delayed-release capsule Take 1 Capsule by mouth every evening.    coenzyme Q-10 30 MG capsule Take 60 mg by mouth every day.    ibuprofen (MOTRIN) 800 MG Tab Take 1 Tablet by mouth every 8 hours as needed for Mild Pain.    Omega-3 Fatty Acids (FISH OIL) 1000 MG Cap capsule Take 1,000 mg by mouth every day.    aspirin EC 81 MG EC tablet Take 1 Tab by mouth every day.    Alirocumab (PRALUENT) 150 MG/ML Solution Auto-injector Inject 1 mL under the skin every 14 days. (Patient not taking: Reported on 12/18/2024)         Review of Systems   Respiratory:  Negative for cough and shortness of breath.    Cardiovascular:  Negative for chest pain, palpitations, orthopnea, leg swelling and PND.   Neurological:  Negative for dizziness and loss of consciousness.          Objective:   /72 (BP Location: Left arm, Patient Position: Sitting, BP Cuff Size: Adult)   Pulse (!) 58   Resp 16   Ht 1.88 m (6' 2\")   Wt 93 kg (205 lb)   SpO2 98%  Body mass index is 26.32 kg/m².         Physical Exam  Vitals reviewed.   HENT:      Head: Normocephalic and atraumatic.   Cardiovascular:      Rate and Rhythm: Normal rate and regular rhythm.      Heart sounds: No murmur heard.  Pulmonary:      Effort: Pulmonary effort is normal. No respiratory distress.      Breath sounds: No rales.   Abdominal:      General: There is no distension.   Musculoskeletal:      Right lower leg: No edema.      Left lower leg: No edema.   Skin:     General: Skin is warm.   Neurological:      Mental Status: He is alert.      Gait: Gait normal.   Psychiatric:         Judgment: Judgment normal.            Assessment:     1. Mixed hyperlipidemia  Lipid Profile      2. ACS (acute coronary syndrome) (HCC)        3. Coronary artery " disease involving native coronary artery of native heart without angina pectoris        4. Elevated fasting glucose  HEMOGLOBIN A1C (Glycohemoglobin GHB Total/A1C with MBG Estimate)      5. Ischemic heart disease due to coronary artery obstruction (HCC)        6. Hx of CABG        7. Elevated lipoprotein(a)        8. Statin intolerance        9. Essential hypertension, benign             Medical Decision Making:  Today's Assessment / Plan:   Premature CAD  NSTEMI in 2018 s/p CABGx3 (LIMA-LAD, SVG-diagonal, SVG-distal LCx)  -Documented statin intolerance now tolerating high-dose Praluent injections every 2 weeks  -Continue low-dose aspirin  -Major portion of this visit was spent discussing heart healthy diet including elements of the DASH diet for blood pressure lowering effect as well as focusing on cooking with healthy fats, increasing exercise at least 30 minutes/day minutes/day    Dyslipidemia, elevated LP(a)  -Target a goal of LDL less than 55.  Continue with Praluent injections 150 every 2 weeks as he had not been taking this for more than 1 dose before his labs from 12/12 were drawn.  Repeat lipid profile in 6 months    Hypertension  -Possibly masked as he is reporting slightly elevated numbers at home.  Discussed some diet modifications including sodium reduction less than 2 g/day.  He is resistant to increasing this dose, continue enalapril 10 mg    Return in about 6 months (around 6/18/2025) for follow up with Dr Thompson.  Sooner if problems.

## 2024-12-18 ENCOUNTER — OFFICE VISIT (OUTPATIENT)
Dept: CARDIOLOGY | Facility: MEDICAL CENTER | Age: 64
End: 2024-12-18
Attending: PHYSICIAN ASSISTANT
Payer: MEDICARE

## 2024-12-18 VITALS
DIASTOLIC BLOOD PRESSURE: 72 MMHG | WEIGHT: 205 LBS | SYSTOLIC BLOOD PRESSURE: 122 MMHG | OXYGEN SATURATION: 98 % | BODY MASS INDEX: 26.31 KG/M2 | HEIGHT: 74 IN | HEART RATE: 58 BPM | RESPIRATION RATE: 16 BRPM

## 2024-12-18 DIAGNOSIS — I25.10 CORONARY ARTERY DISEASE INVOLVING NATIVE CORONARY ARTERY OF NATIVE HEART WITHOUT ANGINA PECTORIS: ICD-10-CM

## 2024-12-18 DIAGNOSIS — I25.9 ISCHEMIC HEART DISEASE DUE TO CORONARY ARTERY OBSTRUCTION (HCC): ICD-10-CM

## 2024-12-18 DIAGNOSIS — Z95.1 HX OF CABG: ICD-10-CM

## 2024-12-18 DIAGNOSIS — E78.41 ELEVATED LIPOPROTEIN(A): ICD-10-CM

## 2024-12-18 DIAGNOSIS — E78.2 MIXED HYPERLIPIDEMIA: ICD-10-CM

## 2024-12-18 DIAGNOSIS — R73.01 ELEVATED FASTING GLUCOSE: ICD-10-CM

## 2024-12-18 DIAGNOSIS — Z78.9 STATIN INTOLERANCE: ICD-10-CM

## 2024-12-18 DIAGNOSIS — I10 ESSENTIAL HYPERTENSION, BENIGN: ICD-10-CM

## 2024-12-18 DIAGNOSIS — I24.0 ISCHEMIC HEART DISEASE DUE TO CORONARY ARTERY OBSTRUCTION (HCC): ICD-10-CM

## 2024-12-18 PROBLEM — I24.9 ACS (ACUTE CORONARY SYNDROME) (HCC): Status: RESOLVED | Noted: 2018-02-02 | Resolved: 2024-12-18

## 2024-12-18 PROCEDURE — 3078F DIAST BP <80 MM HG: CPT | Performed by: PHYSICIAN ASSISTANT

## 2024-12-18 PROCEDURE — 3074F SYST BP LT 130 MM HG: CPT | Performed by: PHYSICIAN ASSISTANT

## 2024-12-18 PROCEDURE — 99213 OFFICE O/P EST LOW 20 MIN: CPT | Performed by: PHYSICIAN ASSISTANT

## 2024-12-18 PROCEDURE — 99214 OFFICE O/P EST MOD 30 MIN: CPT | Performed by: PHYSICIAN ASSISTANT

## 2024-12-18 ASSESSMENT — ENCOUNTER SYMPTOMS
LOSS OF CONSCIOUSNESS: 0
SHORTNESS OF BREATH: 0
ORTHOPNEA: 0
PND: 0
PALPITATIONS: 0
DIZZINESS: 0
COUGH: 0

## 2024-12-18 ASSESSMENT — FIBROSIS 4 INDEX: FIB4 SCORE: 1.4

## 2024-12-18 NOTE — LETTER
[facility] - Operated by [facility]   [address], [address]  [place] [address]  Phone: [phone]  Fax: [phone]              Anthony Reyes  1960    Encounter Date: 12/18/2024    Lata Benoit P.A.-C.          PROGRESS NOTE:  Chief Complaint   Patient presents with   • Hyperlipidemia   • Hypertension     F/V Dx: Essential hypertension, benign   • Coronary Artery Disease     F/V Dx: Coronary artery disease involving native coronary artery of native heart without angina pectoris          Subjective:   Anthony Reyes is a 64 y.o. male who presents today for follow-up.     Patient of Dr. Thompson.  Current medical problems include coronary artery disease with history of CABG, hypertension, hyperlipidemia with statin intolerance.    Here for follow-up after he is increased the enalapril for blood pressure control.    Overall Anthony is feeling well, especially since he stopped the statin. He has used the 150mg injection of praluent twice. No injection reactions.     His home readings are about 135/84. He does add salt to the meals he cooks at home. He uses NSAIDs sparingly. He does not drink. 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     He expresses anxiety over the amount of medications he is taking and wonders if he will ever be able to come off of these.    Past Medical History:   Diagnosis Date   • CAD (coronary artery disease) 02/03/2017    CABGx3         Family History   Problem Relation Age of Onset   • Heart Disease Maternal Uncle 54        CABG         Social History     Tobacco Use   • Smoking status: Never   • Smokeless tobacco: Former     Quit date: 1980   • Tobacco comments:     Chewed for 5 years   Vaping Use   • Vaping status: Never Used   Substance Use Topics   • Alcohol use: No   • Drug use: No         Allergies   Allergen Reactions   • Atorvastatin Myalgia         Current Outpatient  "Medications   Medication Sig   • enalapril (VASOTEC) 10 MG Tab Take 1 Tablet by mouth every day.   • Alirocumab (PRALUENT) 150 MG/ML Solution Auto-injector Inject 1 mL under the skin every 14 days. (Patient taking differently: Inject 1 mL under the skin every 14 days. Once every 30 days)   • albuterol (VENTOLIN HFA) 108 (90 Base) MCG/ACT Aero Soln inhalation aerosol Inhale 1 puff by mouth every 6 hours as needed   • Cyanocobalamin (VITAMIN B-12 PO) Take  by mouth every day.   • VITAMIN D, CHOLECALCIFEROL, PO Take  by mouth every day.   • omeprazole (PRILOSEC) 40 MG delayed-release capsule Take 1 Capsule by mouth every evening.   • coenzyme Q-10 30 MG capsule Take 60 mg by mouth every day.   • ibuprofen (MOTRIN) 800 MG Tab Take 1 Tablet by mouth every 8 hours as needed for Mild Pain.   • Omega-3 Fatty Acids (FISH OIL) 1000 MG Cap capsule Take 1,000 mg by mouth every day.   • aspirin EC 81 MG EC tablet Take 1 Tab by mouth every day.   • Alirocumab (PRALUENT) 150 MG/ML Solution Auto-injector Inject 1 mL under the skin every 14 days. (Patient not taking: Reported on 12/18/2024)         Review of Systems   Respiratory:  Negative for cough and shortness of breath.    Cardiovascular:  Negative for chest pain, palpitations, orthopnea, leg swelling and PND.   Neurological:  Negative for dizziness and loss of consciousness.          Objective:   /72 (BP Location: Left arm, Patient Position: Sitting, BP Cuff Size: Adult)   Pulse (!) 58   Resp 16   Ht 1.88 m (6' 2\")   Wt 93 kg (205 lb)   SpO2 98%  Body mass index is 26.32 kg/m².         Physical Exam  Vitals reviewed.   HENT:      Head: Normocephalic and atraumatic.   Cardiovascular:      Rate and Rhythm: Normal rate and regular rhythm.      Heart sounds: No murmur heard.  Pulmonary:      Effort: Pulmonary effort is normal. No respiratory distress.      Breath sounds: No rales.   Abdominal:      General: There is no distension.   Musculoskeletal:      Right lower " leg: No edema.      Left lower leg: No edema.   Skin:     General: Skin is warm.   Neurological:      Mental Status: He is alert.      Gait: Gait normal.   Psychiatric:         Judgment: Judgment normal.            Assessment:     1. Mixed hyperlipidemia  Lipid Profile      2. ACS (acute coronary syndrome) (HCC)        3. Coronary artery disease involving native coronary artery of native heart without angina pectoris        4. Elevated fasting glucose  HEMOGLOBIN A1C (Glycohemoglobin GHB Total/A1C with MBG Estimate)      5. Ischemic heart disease due to coronary artery obstruction (HCC)        6. Hx of CABG        7. Elevated lipoprotein(a)        8. Statin intolerance        9. Essential hypertension, benign             Medical Decision Making:  Today's Assessment / Plan:   Premature CAD  NSTEMI in 2018 s/p CABGx3 (LIMA-LAD, SVG-diagonal, SVG-distal LCx)  -Documented statin intolerance now tolerating high-dose Praluent injections every 2 weeks  -Continue low-dose aspirin  -Major portion of this visit was spent discussing heart healthy diet including elements of the DASH diet for blood pressure lowering effect as well as focusing on cooking with healthy fats, increasing exercise at least 30 minutes/day minutes/day    Dyslipidemia, elevated LP(a)  -Target a goal of LDL less than 55.  Continue with Praluent injections 150 every 2 weeks as he had not been taking this for more than 1 dose before his labs from 12/12 were drawn.  Repeat lipid profile in 6 months    Hypertension  -Possibly masked as he is reporting slightly elevated numbers at home.  Discussed some diet modifications including sodium reduction less than 2 g/day.  He is resistant to increasing this dose, continue enalapril 10 mg    Return in about 6 months (around 6/18/2025) for follow up with Dr Thompson.  Sooner if problems.      Matteo Berrios P.A.-C.  [address] [place] [address]  Via Fax: [phone]

## 2024-12-18 NOTE — PATIENT INSTRUCTIONS
Less than 130/80    LDL goal is less than 70, better if closer to 55    DASH diet to help with blood pressure

## 2025-01-29 DIAGNOSIS — E78.2 MIXED HYPERLIPIDEMIA: ICD-10-CM

## 2025-01-29 DIAGNOSIS — E78.41 ELEVATED LIPOPROTEIN(A): ICD-10-CM

## 2025-01-29 NOTE — PROGRESS NOTES
[facility]  Date of Referral: 11/22/2023 - Renewed today (1/30/25)    Anthony Kelechi Amy presents for management of dyslipidemia    Referral Source: Dr. Thompson  Date First Seen in Clinic: 12/9/22    PERTINENT HLD PMHX  Age at Initial Diagnosis of Dyslipidemia: Unknown      Baseline Lipids Prior to Treatment:    Latest Reference Range & Units 01/29/21 12:16   Cholesterol,Tot 100 - 199 mg/dL 167   Triglycerides 0 - 149 mg/dL 75   HDL >=40 mg/dL 50   LDL <100 mg/dL 102 (H)   Lipoprotein (a) <=29 mg/dL 131 (H)   (H): Data is abnormally high    History of ASCVD: Hx of CABG & MI     Previously Attempted Interventions for Lipids - including outcome  Statin: Atorvastatin 40 mg & 80 mg once daily                           Outcome: myalgias, arthralgia, migraines - prompted ED visit  Non-Statin: none  Outcome: not applicable    Secondary causes/contributors (report to medical director if present):   Endocrine/Hypothyroidism:  none reported   Liver disease: none reported   Renal disease/nephrotic syndrome:  none reported  Dietary-induced (ketogenic, lean mass hyper-responder)? no  Medications: None    CURRENT MED MGMT  Current Lipid Lowering Meds:   Statin: None   Non-Statin: Praluent 150 mg subQ once every 2 weeks   Supplements: None  Current adverse drug reactions/side effects? no  Is Adherence an Issue? No    Any Family History Cardiovascular Disease? no    There were no vitals filed for this visit.   BMI Readings from Last 1 Encounters:   12/18/24 26.32 kg/m²      Wt Readings from Last 3 Encounters:   12/18/24 93 kg (205 lb)   11/07/24 94.2 kg (207 lb 9.6 oz)   10/01/24 94.3 kg (208 lb)     BP Readings from Last 2 Encounters:   12/18/24 122/72   11/07/24 132/83       DATA REVIEW:  Most Recent Lipid Panel:   Lab Results   Component Value Date/Time    CHOLSTRLTOT 155 12/12/2024 06:07 AM    LDL 85 12/12/2024 06:07 AM    HDL 50 12/12/2024 06:07 AM    TRIGLYCERIDE 102 12/12/2024 06:07 AM     Lab Results  "  Component Value Date/Time    LDL 85 12/12/2024 06:07 AM    LDL 81 10/17/2024 07:47 AM    LDL 80 08/26/2024 06:48 AM      Lab Results   Component Value Date/Time    LIPOPROTA 118 (H) 08/26/2024 06:48 AM      Lab Results   Component Value Date/Time    APOB 79 12/12/2024 06:07 AM      No results found for: \"CRPHIGHSEN\"    Other Pertinent Blood Work:   Lab Results   Component Value Date    SODIUM 139 12/12/2024    POTASSIUM 4.2 12/12/2024    CHLORIDE 107 12/12/2024    CO2 21 12/12/2024    ANION 11.0 12/12/2024    GLUCOSE 114 (H) 12/12/2024    BUN 26 (H) 12/12/2024    CREATININE 0.88 12/12/2024    CALCIUM 9.4 12/12/2024    ASTSGOT 17 12/12/2024    ALTSGPT 17 12/12/2024    ALKPHOSPHAT 87 12/12/2024    TBILIRUBIN 0.4 12/12/2024    ALBUMIN 4.2 12/12/2024    AGRATIO 1.8 12/12/2024       VASCULAR IMAGING:  CAC Score (if available): None  CIMT/Vascular screen (if available): None  Other (if applicable): N/a    ASSESSMENT AND PLAN    Patient Type, check all that apply: Secondary Prevention    Major ASCVD events:  History of prior MI >12 months ago  and Documented clinical evidence of ASCVD:  hx of CABG       Evidence of genetic dyslipidemia (Familial Hyperlipidemia): No     ASCVD risk calculations (if applicable)  The 10-year ASCVD risk score (Yesi DILLARD, et al., 2019) is: 9.6%, N/A    ACC/AHA Indication for Statin Therapy:  Established ASCVD: Indication for High intensity statin     Other Significant Risk Markers:  High-risk conditions: Hypertension   Risk-enhancers: Lp(a) >50mg/dL (>125 nmol/L)  Lipoprotein(a): Very High (>100 mg/dl or >225 nmol/L  Most recent CAC (if available): None    Lipid Goals:  Primary: LDL-C <70 mg/dl per cards  Secondary apoB <70 mg/dl  At goals? No    LIFESTYLE INTERVENTIONS  TOBACCO: None  continued complete avoidance of all tobacco products   EtOH: Avoids  Men: No more than two standard servings per day  Women: No more than one standard serving per day  PHYSICAL ACTIVITY: at least 150 min per " week of moderate intensity - Has been walking less w/ cooler weather (typically walks 2 miles daily). Doing more weight training/body exercises (60 push ups daily and curls).  NUTRITION: Mediterranean and DASH , low Na - eating salads, chicken, stew, sandwiches, eggs, baked potato, vegetables, less sugar lately. Drinks tea w/ lemon, black coffee, water.     LIPID-LOWERING MEDICATION MANAGEMENT:     Statin Therapy:   Would not rechallenge at this time due to hx of intolerance  - pt notes he feels significantly better now that he is off of statin therapy    PCSK9 Inhibitor strategy indications: ASCVD with suboptimal control of LDL-C despite maximally tolerated statin    Non-Statin Meds:   EZETIMIBE: START Ezetimibe 10 mg once daily  NEXLETOL/NEXLIZET (avoid simva >20, prava >40): Not currently indicated - will consider at f/u if LDL remains above goal  BAS: Not currently indicated    OMEGA-3 FAs: Continue fish oil 1g once daily  FIBRATE:  Not currently indicated  PCSK9 mAb: Continue Praluent to 150 mg subQ Q14D  LEQVIO: Not currently indicated    Recommended Supplements: None     Studies Ordered at Todays Visit: None   Blood Work To Be Obtained Prior to Next Visit: Lipid panel, CMP, and ApoB  Follow-Up: 3 months    Tirso Grijalva, PharmD     CC:  Matteo Berrios P.A.-C.

## 2025-01-30 ENCOUNTER — HOSPITAL ENCOUNTER (OUTPATIENT)
Facility: MEDICAL CENTER | Age: 65
End: 2025-01-30
Attending: PHYSICIAN ASSISTANT
Payer: MEDICARE

## 2025-01-30 ENCOUNTER — NON-PROVIDER VISIT (OUTPATIENT)
Dept: CARDIOLOGY | Facility: MEDICAL CENTER | Age: 65
End: 2025-01-30
Attending: PHYSICIAN ASSISTANT
Payer: MEDICARE

## 2025-01-30 ENCOUNTER — HOSPITAL ENCOUNTER (OUTPATIENT)
Dept: LAB | Facility: MEDICAL CENTER | Age: 65
End: 2025-01-30
Attending: INTERNAL MEDICINE
Payer: MEDICARE

## 2025-01-30 VITALS — WEIGHT: 205 LBS | BODY MASS INDEX: 26.32 KG/M2

## 2025-01-30 DIAGNOSIS — E78.41 ELEVATED LIPOPROTEIN(A): ICD-10-CM

## 2025-01-30 DIAGNOSIS — I24.9 ACS (ACUTE CORONARY SYNDROME) (HCC): ICD-10-CM

## 2025-01-30 DIAGNOSIS — I25.5 ISCHEMIC CARDIOMYOPATHY: ICD-10-CM

## 2025-01-30 DIAGNOSIS — I25.10 CORONARY ARTERY DISEASE INVOLVING NATIVE CORONARY ARTERY OF NATIVE HEART WITHOUT ANGINA PECTORIS: ICD-10-CM

## 2025-01-30 DIAGNOSIS — E78.2 MIXED HYPERLIPIDEMIA: ICD-10-CM

## 2025-01-30 DIAGNOSIS — R73.01 ELEVATED FASTING GLUCOSE: ICD-10-CM

## 2025-01-30 DIAGNOSIS — K21.9 GASTROESOPHAGEAL REFLUX DISEASE, UNSPECIFIED WHETHER ESOPHAGITIS PRESENT: ICD-10-CM

## 2025-01-30 LAB
ALBUMIN SERPL BCP-MCNC: 4.2 G/DL (ref 3.2–4.9)
ALBUMIN/GLOB SERPL: 1.8 G/DL
ALP SERPL-CCNC: 81 U/L (ref 30–99)
ALT SERPL-CCNC: 15 U/L (ref 2–50)
ANION GAP SERPL CALC-SCNC: 10 MMOL/L (ref 7–16)
AST SERPL-CCNC: 17 U/L (ref 12–45)
BILIRUB SERPL-MCNC: 0.5 MG/DL (ref 0.1–1.5)
BUN SERPL-MCNC: 24 MG/DL (ref 8–22)
CALCIUM ALBUM COR SERPL-MCNC: 9.4 MG/DL (ref 8.5–10.5)
CALCIUM SERPL-MCNC: 9.6 MG/DL (ref 8.5–10.5)
CHLORIDE SERPL-SCNC: 106 MMOL/L (ref 96–112)
CHOLEST SERPL-MCNC: 163 MG/DL (ref 100–199)
CO2 SERPL-SCNC: 22 MMOL/L (ref 20–33)
CREAT SERPL-MCNC: 1.02 MG/DL (ref 0.5–1.4)
EST. AVERAGE GLUCOSE BLD GHB EST-MCNC: 111 MG/DL
FASTING STATUS PATIENT QL REPORTED: NORMAL
GFR SERPLBLD CREATININE-BSD FMLA CKD-EPI: 82 ML/MIN/1.73 M 2
GLOBULIN SER CALC-MCNC: 2.3 G/DL (ref 1.9–3.5)
GLUCOSE SERPL-MCNC: 103 MG/DL (ref 65–99)
HBA1C MFR BLD: 5.5 % (ref 4–5.6)
HDLC SERPL-MCNC: 58 MG/DL
LDLC SERPL CALC-MCNC: 89 MG/DL
POTASSIUM SERPL-SCNC: 4.1 MMOL/L (ref 3.6–5.5)
PROT SERPL-MCNC: 6.5 G/DL (ref 6–8.2)
SODIUM SERPL-SCNC: 138 MMOL/L (ref 135–145)
TRIGL SERPL-MCNC: 81 MG/DL (ref 0–149)

## 2025-01-30 PROCEDURE — 83036 HEMOGLOBIN GLYCOSYLATED A1C: CPT

## 2025-01-30 PROCEDURE — 99212 OFFICE O/P EST SF 10 MIN: CPT | Performed by: PHARMACIST

## 2025-01-30 PROCEDURE — RXMED WILLOW AMBULATORY MEDICATION CHARGE: Performed by: PHYSICIAN ASSISTANT

## 2025-01-30 PROCEDURE — 82172 ASSAY OF APOLIPOPROTEIN: CPT

## 2025-01-30 PROCEDURE — 36415 COLL VENOUS BLD VENIPUNCTURE: CPT

## 2025-01-30 PROCEDURE — 80053 COMPREHEN METABOLIC PANEL: CPT

## 2025-01-30 PROCEDURE — RXMED WILLOW AMBULATORY MEDICATION CHARGE: Performed by: INTERNAL MEDICINE

## 2025-01-30 PROCEDURE — 80061 LIPID PANEL: CPT

## 2025-01-30 RX ORDER — OMEPRAZOLE 40 MG/1
40 CAPSULE, DELAYED RELEASE ORAL EVERY EVENING
Qty: 90 CAPSULE | Refills: 3 | Status: SHIPPED | OUTPATIENT
Start: 2025-01-30

## 2025-01-30 RX ORDER — ALIROCUMAB 150 MG/ML
1 INJECTION, SOLUTION SUBCUTANEOUS
Qty: 6 ML | Refills: 3 | Status: SHIPPED | OUTPATIENT
Start: 2025-01-30

## 2025-01-30 ASSESSMENT — FIBROSIS 4 INDEX: FIB4 SCORE: 1.4

## 2025-01-30 NOTE — TELEPHONE ENCOUNTER
Placed omeprazole refill request per pt request. Advised pt he likely needs to obtain from PCP vs Cardiology, but he insists we send the request to his cardiologist.    Tirso Grijalva, TeresitaD, BCACP

## 2025-01-31 PROCEDURE — RXMED WILLOW AMBULATORY MEDICATION CHARGE: Performed by: INTERNAL MEDICINE

## 2025-01-31 RX ORDER — EZETIMIBE 10 MG/1
10 TABLET ORAL DAILY
Qty: 30 TABLET | Refills: 11 | Status: SHIPPED | OUTPATIENT
Start: 2025-01-31

## 2025-02-02 LAB — APO B100 SERPL-MCNC: 85 MG/DL (ref 66–133)

## 2025-02-05 ENCOUNTER — PHARMACY VISIT (OUTPATIENT)
Dept: PHARMACY | Facility: MEDICAL CENTER | Age: 65
End: 2025-02-05
Payer: COMMERCIAL

## 2025-04-18 PROCEDURE — RXMED WILLOW AMBULATORY MEDICATION CHARGE: Performed by: PHYSICIAN ASSISTANT

## 2025-04-22 ENCOUNTER — PHARMACY VISIT (OUTPATIENT)
Dept: PHARMACY | Facility: MEDICAL CENTER | Age: 65
End: 2025-04-22
Payer: COMMERCIAL

## 2025-05-07 ENCOUNTER — TELEPHONE (OUTPATIENT)
Dept: VASCULAR LAB | Facility: MEDICAL CENTER | Age: 65
End: 2025-05-07
Payer: MEDICARE

## 2025-05-08 ENCOUNTER — APPOINTMENT (OUTPATIENT)
Dept: CARDIOLOGY | Facility: MEDICAL CENTER | Age: 65
End: 2025-05-08
Attending: INTERNAL MEDICINE
Payer: MEDICARE

## 2025-05-08 NOTE — TELEPHONE ENCOUNTER
Caller: Anthony Reyes     Topic/issue: Patient is needing to reschedule Cam B follow up. I am unable to schedule as the patients dx / referral does not adhere to our scheduling guidelines. Please contact the patient back for scheduling    Callback Number: [phone]      Thank you  Monica EVANS

## 2025-05-22 PROCEDURE — RXMED WILLOW AMBULATORY MEDICATION CHARGE: Performed by: INTERNAL MEDICINE

## 2025-05-22 PROCEDURE — RXMED WILLOW AMBULATORY MEDICATION CHARGE: Performed by: PHYSICIAN ASSISTANT

## 2025-05-30 ENCOUNTER — PHARMACY VISIT (OUTPATIENT)
Dept: PHARMACY | Facility: MEDICAL CENTER | Age: 65
End: 2025-05-30
Payer: COMMERCIAL

## 2025-06-05 ENCOUNTER — NON-PROVIDER VISIT (OUTPATIENT)
Dept: CARDIOLOGY | Facility: MEDICAL CENTER | Age: 65
End: 2025-06-05
Attending: PHYSICIAN ASSISTANT
Payer: MEDICARE

## 2025-06-05 ENCOUNTER — HOSPITAL ENCOUNTER (OUTPATIENT)
Dept: LAB | Facility: MEDICAL CENTER | Age: 65
End: 2025-06-05
Attending: INTERNAL MEDICINE
Payer: MEDICARE

## 2025-06-05 VITALS
SYSTOLIC BLOOD PRESSURE: 108 MMHG | HEART RATE: 65 BPM | DIASTOLIC BLOOD PRESSURE: 72 MMHG | WEIGHT: 201 LBS | BODY MASS INDEX: 25.81 KG/M2

## 2025-06-05 DIAGNOSIS — I24.9 ACS (ACUTE CORONARY SYNDROME) (HCC): ICD-10-CM

## 2025-06-05 DIAGNOSIS — E78.41 ELEVATED LIPOPROTEIN(A): ICD-10-CM

## 2025-06-05 DIAGNOSIS — I10 ESSENTIAL HYPERTENSION, BENIGN: ICD-10-CM

## 2025-06-05 DIAGNOSIS — Z78.9 STATIN INTOLERANCE: ICD-10-CM

## 2025-06-05 DIAGNOSIS — I25.10 CORONARY ARTERY DISEASE INVOLVING NATIVE CORONARY ARTERY OF NATIVE HEART WITHOUT ANGINA PECTORIS: ICD-10-CM

## 2025-06-05 DIAGNOSIS — Z95.1 HX OF CABG: Primary | ICD-10-CM

## 2025-06-05 LAB
ALBUMIN SERPL BCP-MCNC: 4.5 G/DL (ref 3.2–4.9)
ALBUMIN/GLOB SERPL: 1.8 G/DL
ALP SERPL-CCNC: 89 U/L (ref 30–99)
ALT SERPL-CCNC: 12 U/L (ref 2–50)
ANION GAP SERPL CALC-SCNC: 12 MMOL/L (ref 7–16)
AST SERPL-CCNC: 17 U/L (ref 12–45)
BILIRUB SERPL-MCNC: 0.5 MG/DL (ref 0.1–1.5)
BUN SERPL-MCNC: 33 MG/DL (ref 8–22)
CALCIUM ALBUM COR SERPL-MCNC: 9.1 MG/DL (ref 8.5–10.5)
CALCIUM SERPL-MCNC: 9.5 MG/DL (ref 8.5–10.5)
CHLORIDE SERPL-SCNC: 107 MMOL/L (ref 96–112)
CHOLEST SERPL-MCNC: 190 MG/DL (ref 100–199)
CO2 SERPL-SCNC: 21 MMOL/L (ref 20–33)
CREAT SERPL-MCNC: 0.99 MG/DL (ref 0.5–1.4)
FASTING STATUS PATIENT QL REPORTED: NORMAL
GFR SERPLBLD CREATININE-BSD FMLA CKD-EPI: 85 ML/MIN/1.73 M 2
GLOBULIN SER CALC-MCNC: 2.5 G/DL (ref 1.9–3.5)
GLUCOSE SERPL-MCNC: 116 MG/DL (ref 65–99)
HDLC SERPL-MCNC: 55 MG/DL
LDLC SERPL CALC-MCNC: 119 MG/DL
POTASSIUM SERPL-SCNC: 4.3 MMOL/L (ref 3.6–5.5)
PROT SERPL-MCNC: 7 G/DL (ref 6–8.2)
SODIUM SERPL-SCNC: 140 MMOL/L (ref 135–145)
TRIGL SERPL-MCNC: 78 MG/DL (ref 0–149)

## 2025-06-05 PROCEDURE — 99212 OFFICE O/P EST SF 10 MIN: CPT | Performed by: PHARMACIST

## 2025-06-05 PROCEDURE — 36415 COLL VENOUS BLD VENIPUNCTURE: CPT

## 2025-06-05 PROCEDURE — 80061 LIPID PANEL: CPT

## 2025-06-05 PROCEDURE — 82172 ASSAY OF APOLIPOPROTEIN: CPT

## 2025-06-05 PROCEDURE — 80053 COMPREHEN METABOLIC PANEL: CPT

## 2025-06-05 ASSESSMENT — FIBROSIS 4 INDEX: FIB4 SCORE: 1.51

## 2025-06-05 NOTE — PROGRESS NOTES
[facility]  Date of Referral: 1/30/25    Anthony Reyes presents for management of dyslipidemia    Referral Source: Dr. Thompson  Date First Seen in Clinic: 12/9/22    PERTINENT HLD PMHX  Age at Initial Diagnosis of Dyslipidemia: Unknown      Baseline Lipids Prior to Treatment:    Latest Reference Range & Units 01/29/21 12:16   Cholesterol,Tot 100 - 199 mg/dL 167   Triglycerides 0 - 149 mg/dL 75   HDL >=40 mg/dL 50   LDL <100 mg/dL 102 (H)   Lipoprotein (a) <=29 mg/dL 131 (H)   (H): Data is abnormally high    History of ASCVD: Hx of CABG & MI     Previously Attempted Interventions for Lipids - including outcome  Statin: Atorvastatin 40 mg & 80 mg once daily                           Outcome: myalgias, arthralgia, migraines - prompted ED visit  Non-Statin: none  Outcome: not applicable    Secondary causes/contributors (report to medical director if present):   Endocrine/Hypothyroidism:  none reported   Liver disease: none reported   Renal disease/nephrotic syndrome:  none reported  Dietary-induced (ketogenic, lean mass hyper-responder)? no  Medications: None    CURRENT MED MGMT  Current Lipid Lowering Meds:   Statin: None   Non-Statin: Praluent 150 mg subQ once every 2 weeks, ezetimibe 10 mg once daily  Supplements: None  Current adverse drug reactions/side effects? Possibly - pt c/o more abd bloating/pain for the last 3 months, but pt states he took a drug holiday from ezetimibe and did not note any improvement. Plans to f/u w/ GI.  Is Adherence an Issue? Yes, took a week off of ezetimibe. He did not take Praluent for the last month as well d/t trying to r/o GI issue. Noted no improvement in GI sx so amenable to resuming.    Any Family History Cardiovascular Disease? no    There were no vitals filed for this visit.   BMI Readings from Last 1 Encounters:   01/30/25 26.32 kg/m²      Wt Readings from Last 3 Encounters:   01/30/25 93 kg (205 lb)   12/18/24 93 kg (205 lb)   11/07/24 94.2 kg (207 lb 9.6  "oz)     BP Readings from Last 2 Encounters:   12/18/24 122/72   11/07/24 132/83       DATA REVIEW:  Most Recent Lipid Panel:   Lab Results   Component Value Date/Time    CHOLSTRLTOT 163 01/30/2025 06:35 AM    LDL 89 01/30/2025 06:35 AM    HDL 58 01/30/2025 06:35 AM    TRIGLYCERIDE 81 01/30/2025 06:35 AM     Lab Results   Component Value Date/Time    LDL 89 01/30/2025 06:35 AM    LDL 85 12/12/2024 06:07 AM    LDL 81 10/17/2024 07:47 AM      Lab Results   Component Value Date/Time    LIPOPROTA 118 (H) 08/26/2024 06:48 AM      Lab Results   Component Value Date/Time    APOB 85 01/30/2025 06:35 AM      No results found for: \"CRPHIGHSEN\"    Other Pertinent Blood Work:   Lab Results   Component Value Date    SODIUM 138 01/30/2025    POTASSIUM 4.1 01/30/2025    CHLORIDE 106 01/30/2025    CO2 22 01/30/2025    ANION 10.0 01/30/2025    GLUCOSE 103 (H) 01/30/2025    BUN 24 (H) 01/30/2025    CREATININE 1.02 01/30/2025    CALCIUM 9.6 01/30/2025    ASTSGOT 17 01/30/2025    ALTSGPT 15 01/30/2025    ALKPHOSPHAT 81 01/30/2025    TBILIRUBIN 0.5 01/30/2025    ALBUMIN 4.2 01/30/2025    AGRATIO 1.8 01/30/2025       VASCULAR IMAGING:  CAC Score (if available): None  CIMT/Vascular screen (if available): None  Other (if applicable): N/a    ASSESSMENT AND PLAN    Patient Type, check all that apply: Secondary Prevention    Major ASCVD events:  History of prior MI >12 months ago  and Documented clinical evidence of ASCVD:  hx of CABG       Evidence of genetic dyslipidemia (Familial Hyperlipidemia): No     ASCVD risk calculations (if applicable)  The ASCVD Risk score (Yesi DILLARD, et al., 2019) failed to calculate., N/A    ACC/AHA Indication for Statin Therapy:  Established ASCVD: Indication for High intensity statin     Other Significant Risk Markers:  High-risk conditions: Hypertension   Risk-enhancers: Lp(a) >50mg/dL (>125 nmol/L)  Lipoprotein(a): Very High (>100 mg/dl or >225 nmol/L  Most recent CAC (if available): None    Lipid " Goals:  Primary: LDL-C <70 mg/dl per cards  Secondary apoB <70 mg/dl  At goals? TBD - pt took a month of his Praluent and a week off of his ezetimibe since last visit. This was d/t GI discomfort, but he did not find relief w/ drug holiday. Plans to f/u w/ GI for further w/u.    LIFESTYLE INTERVENTIONS  TOBACCO: None  continued complete avoidance of all tobacco products   EtOH: Avoids  Men: No more than two standard servings per day  Women: No more than one standard serving per day  PHYSICAL ACTIVITY: at least 150 min per week of moderate intensity - Walks 2 miles daily. Doing more weight training/body exercises (60 push ups daily and curls). 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  NUTRITION: Mediterranean and DASH , low Na - eating salads, chicken, stew, sandwiches, eggs, baked potato, vegetables, less sugar lately. Drinks tea w/ lemon, black coffee, water.     LIPID-LOWERING MEDICATION MANAGEMENT:     Statin Therapy:   Would not rechallenge at this time due to hx of intolerance     PCSK9 Inhibitor strategy indications: ASCVD with suboptimal control of LDL-C despite maximally tolerated statin    Non-Statin Meds:   EZETIMIBE: Continue ezetimibe 10 mg once daily  NEXLETOL/NEXLIZET (avoid simva >20, prava >40): Not currently indicated - will consider at f/u if LDL remains above goal  BAS: Not currently indicated    OMEGA-3 FAs: Continue fish oil 1g once daily  FIBRATE:  Not currently indicated  PCSK9 mAb: Continue Praluent 150 mg subQ Q14D  LEQVIO: Not currently indicated    Recommended Supplements: None     Studies Ordered at Todays Visit: None   Blood Work To Be Obtained Prior to Next Visit: Lipid panel, CMP, and ApoB  Follow-Up: Tomorrow via telephone, 10 weeks in clinic    Tirso Grijalva, Osmin     CC:  Matteo Berrios P.A.-C.

## 2025-06-07 LAB — APO B100 SERPL-MCNC: 100 MG/DL (ref 66–133)

## 2025-06-09 ENCOUNTER — RESULTS FOLLOW-UP (OUTPATIENT)
Dept: CARDIOLOGY | Facility: MEDICAL CENTER | Age: 65
End: 2025-06-09
Payer: MEDICARE

## 2025-06-10 NOTE — PROGRESS NOTES
Addendum 6/10/25:    Called and s/w pt regarding lipid lab results. Unfortunately LDL and apoB worsened as expected since last visit.     Pt encouraged to resume Praluent and ezetimibe w/ no missed doses prior to f/u.    Answered pt questions to his satisfaction.    FU in August w/ repeat labs.    Tirso Grijalva, TeresitaD, BCACP

## 2025-07-11 ENCOUNTER — OFFICE VISIT (OUTPATIENT)
Dept: CARDIOLOGY | Facility: MEDICAL CENTER | Age: 65
End: 2025-07-11
Attending: INTERNAL MEDICINE
Payer: MEDICARE

## 2025-07-11 VITALS
HEIGHT: 74 IN | WEIGHT: 205 LBS | RESPIRATION RATE: 16 BRPM | DIASTOLIC BLOOD PRESSURE: 66 MMHG | HEART RATE: 65 BPM | OXYGEN SATURATION: 99 % | SYSTOLIC BLOOD PRESSURE: 112 MMHG | BODY MASS INDEX: 26.31 KG/M2

## 2025-07-11 DIAGNOSIS — I25.5 ISCHEMIC CARDIOMYOPATHY: ICD-10-CM

## 2025-07-11 DIAGNOSIS — Z78.9 STATIN INTOLERANCE: ICD-10-CM

## 2025-07-11 DIAGNOSIS — I25.9 ISCHEMIC HEART DISEASE DUE TO CORONARY ARTERY OBSTRUCTION (HCC): ICD-10-CM

## 2025-07-11 DIAGNOSIS — I25.10 CORONARY ARTERY DISEASE INVOLVING NATIVE CORONARY ARTERY OF NATIVE HEART WITHOUT ANGINA PECTORIS: ICD-10-CM

## 2025-07-11 DIAGNOSIS — Z95.1 HX OF CABG: ICD-10-CM

## 2025-07-11 DIAGNOSIS — I24.0 ISCHEMIC HEART DISEASE DUE TO CORONARY ARTERY OBSTRUCTION (HCC): ICD-10-CM

## 2025-07-11 DIAGNOSIS — E78.2 MIXED HYPERLIPIDEMIA: Primary | ICD-10-CM

## 2025-07-11 DIAGNOSIS — I10 ESSENTIAL HYPERTENSION, BENIGN: ICD-10-CM

## 2025-07-11 PROCEDURE — 99214 OFFICE O/P EST MOD 30 MIN: CPT | Performed by: INTERNAL MEDICINE

## 2025-07-11 PROCEDURE — 3074F SYST BP LT 130 MM HG: CPT | Performed by: INTERNAL MEDICINE

## 2025-07-11 PROCEDURE — G2211 COMPLEX E/M VISIT ADD ON: HCPCS | Performed by: INTERNAL MEDICINE

## 2025-07-11 PROCEDURE — 3078F DIAST BP <80 MM HG: CPT | Performed by: INTERNAL MEDICINE

## 2025-07-11 PROCEDURE — 99213 OFFICE O/P EST LOW 20 MIN: CPT | Performed by: INTERNAL MEDICINE

## 2025-07-11 ASSESSMENT — ENCOUNTER SYMPTOMS
SHORTNESS OF BREATH: 0
BLURRED VISION: 0
WEIGHT GAIN: 0
DEPRESSION: 0
NAUSEA: 0
ORTHOPNEA: 0
COUGH: 0
IRREGULAR HEARTBEAT: 0
DIARRHEA: 0
FEVER: 0
PALPITATIONS: 0
SYNCOPE: 0
VOMITING: 0
WEIGHT LOSS: 0
FLANK PAIN: 0
CONSTIPATION: 0
DYSPNEA ON EXERTION: 0
NEAR-SYNCOPE: 0
DECREASED APPETITE: 0
CLAUDICATION: 0
HEARTBURN: 0
BACK PAIN: 0
DIZZINESS: 0
ABDOMINAL PAIN: 0
ALTERED MENTAL STATUS: 0
PND: 0

## 2025-07-11 ASSESSMENT — FIBROSIS 4 INDEX: FIB4 SCORE: 1.69

## 2025-07-11 NOTE — PROGRESS NOTES
Cardiology Note    Chief Complaint   Patient presents with    Follow-Up     Ischemic heart disease due to coronary artery obstruction (HCC)    Hyperlipidemia    Hypertension       History of Present Illness: Anthony Reyes is a 65 y.o. male who presents for follow up. Previously followed Dr Mcdonnell for CAD, hx CABG, HTN, HLD.    Doing well. No cardiac complaints. Compliant with medications and denies adverse effects other than pause from praluent for four weeks. Works laborious job with concrete.     Review of Systems   Constitutional: Negative for decreased appetite, fever, malaise/fatigue, weight gain and weight loss.   HENT:  Negative for congestion and nosebleeds.    Eyes:  Negative for blurred vision.   Cardiovascular:  Negative for chest pain, claudication, dyspnea on exertion, irregular heartbeat, leg swelling, near-syncope, orthopnea, palpitations, paroxysmal nocturnal dyspnea and syncope.   Respiratory:  Negative for cough and shortness of breath.    Endocrine: Negative for cold intolerance and heat intolerance.   Skin:  Negative for rash.   Musculoskeletal:  Negative for back pain.   Gastrointestinal:  Negative for abdominal pain, constipation, diarrhea, heartburn, melena, nausea and vomiting.   Genitourinary:  Negative for dysuria, flank pain and hematuria.   Neurological:  Negative for dizziness.   Psychiatric/Behavioral:  Negative for altered mental status and depression.          Past Medical History:   Diagnosis Date    CAD (coronary artery disease) 02/03/2017    CABGx3         Past Surgical History:   Procedure Laterality Date    MULTIPLE CORONARY ARTERY BYPASS ENDO VEIN HARVEST  2/3/2018    Procedure: MULTIPLE CORONARY ARTERY BYPASS X3, ENDO VEIN HARVEST RIGHT LOWER LEG;  Surgeon: Fredrick Burger M.D.;  Location: SURGERY [facility];  Service: Cardiothoracic    PB  2/3/2018    Procedure: PB;  Surgeon: Fredrick Burger M.D.;  Location: SURGERY [facility];  Service: Cardiothoracic     ORIF, ANKLE Right          Current Outpatient Medications   Medication Sig Dispense Refill    omeprazole (PRILOSEC) 40 MG delayed-release capsule Take 40 mg capsule by mouth 30 minutes prior to breakfast daily 30 Capsule 3    ezetimibe (ZETIA) 10 MG Tab Take 1 tablet by mouth every day. 30 Tablet 11    Alirocumab (PRALUENT) 150 MG/ML Solution Auto-injector Inject 1 mL under the skin every 14 days. 6 mL 3    Vitamin D-Vitamin K (VITAMIN D2 + K1 PO) Take  by mouth.      enalapril (VASOTEC) 10 MG Tab Take 1 Tablet by mouth every day. 100 Tablet 3    Cyanocobalamin (VITAMIN B-12 PO) Take  by mouth every day.      coenzyme Q-10 30 MG capsule Take 60 mg by mouth every day.      ibuprofen (MOTRIN) 800 MG Tab Take 1 Tablet by mouth every 8 hours as needed for Mild Pain. 30 Tablet 0    Omega-3 Fatty Acids (FISH OIL) 1000 MG Cap capsule Take 1,000 mg by mouth every day.      aspirin EC 81 MG EC tablet Take 1 Tab by mouth every day. 30 Tab      No current facility-administered medications for this visit.         Allergies   Allergen Reactions    Atorvastatin Myalgia         Family History   Problem Relation Age of Onset    Heart Disease Maternal Uncle 54        CABG         Social History     Socioeconomic History    Marital status:      Spouse name: Not on file    Number of children: Not on file    Years of education: Not on file    Highest education level: Not on file   Occupational History    Not on file   Tobacco Use    Smoking status: Never    Smokeless tobacco: Former     Quit date: 1980    Tobacco comments:     Chewed for 5 years   Vaping Use    Vaping status: Never Used   Substance and Sexual Activity    Alcohol use: No    Drug use: No    Sexual activity: Not on file   Other Topics Concern    Not on file   Social History Narrative    Not on file     Social Drivers of Health     Financial Resource Strain: Not on file   Food Insecurity: Not on file   Transportation Needs: Not on file   Physical Activity: Not on  "file   Stress: Not on file   Social Connections: Not on file   Intimate Partner Violence: Not on file   Housing Stability: Not on file         Physical Exam:  Ambulatory Vitals  /66 (BP Location: Left arm, Patient Position: Sitting, BP Cuff Size: Adult)   Pulse 65   Resp 16   Ht 1.88 m (6' 2\")   Wt 93 kg (205 lb)   SpO2 99%    BP Readings from Last 4 Encounters:   07/11/25 112/66   06/05/25 108/72   12/18/24 122/72   11/07/24 132/83     Weight/BMI:   Vitals:    07/11/25 0729   BP: 112/66   Weight: 93 kg (205 lb)   Height: 1.88 m (6' 2\")      Body mass index is 26.32 kg/m².  Wt Readings from Last 4 Encounters:   07/11/25 93 kg (205 lb)   06/05/25 91.2 kg (201 lb)   01/30/25 93 kg (205 lb)   12/18/24 93 kg (205 lb)       Physical Exam  Constitutional:       General: He is not in acute distress.  HENT:      Head: Normocephalic and atraumatic.   Eyes:      Conjunctiva/sclera: Conjunctivae normal.      Pupils: Pupils are equal, round, and reactive to light.   Neck:      Vascular: No JVD.   Cardiovascular:      Rate and Rhythm: Normal rate and regular rhythm.      Heart sounds: Normal heart sounds. No murmur heard.     No friction rub. No gallop.   Pulmonary:      Effort: Pulmonary effort is normal. No respiratory distress.      Breath sounds: Normal breath sounds. No wheezing or rales.   Chest:      Chest wall: No tenderness.   Abdominal:      General: Bowel sounds are normal. There is no distension.      Palpations: Abdomen is soft.   Musculoskeletal:      Cervical back: Normal range of motion and neck supple.   Skin:     General: Skin is warm and dry.   Neurological:      Mental Status: He is alert and oriented to person, place, and time.   Psychiatric:         Mood and Affect: Affect normal.         Judgment: Judgment normal.         Lab Data Review:  Lab Results   Component Value Date/Time    CHOLSTRLTOT 190 06/05/2025 06:10 AM     (H) 06/05/2025 06:10 AM    HDL 55 06/05/2025 06:10 AM    " "TRIGLYCERIDE 78 06/05/2025 06:10 AM       Lab Results   Component Value Date/Time    SODIUM 140 06/05/2025 06:10 AM    POTASSIUM 4.3 06/05/2025 06:10 AM    CHLORIDE 107 06/05/2025 06:10 AM    CO2 21 06/05/2025 06:10 AM    GLUCOSE 116 (H) 06/05/2025 06:10 AM    BUN 33 (H) 06/05/2025 06:10 AM    CREATININE 0.99 06/05/2025 06:10 AM     CrCl cannot be calculated (Patient's most recent lab result is older than the maximum 7 days allowed.).  Lab Results   Component Value Date/Time    ALKPHOSPHAT 89 06/05/2025 06:10 AM    ASTSGOT 17 06/05/2025 06:10 AM    ALTSGPT 12 06/05/2025 06:10 AM    TBILIRUBIN 0.5 06/05/2025 06:10 AM      Lab Results   Component Value Date/Time    WBC 7.5 05/16/2024 02:27 PM     Lab Results   Component Value Date/Time    HBA1C 5.5 01/30/2025 06:35 AM     No components found for: \"TROP\"      Cardiac Imaging and Procedures Review:      EKG 5/1/23 sinus, RVH w repol, inferior infarct old    2/2018 cabg  POSTOPERATIVE DIAGNOSES:  Acute non-ST elevation myocardial infarction,   coronary artery disease, acute on chronic left ventricular systolic and   diastolic failure, severe ischemic cardiomyopathy.  PROCEDURES PERFORMED:  Coronary artery bypass grafting x3 (left internal   mammary artery to the left anterior descending artery and reverse saphenous   vein grafts to the diagonal artery and distal left circumflex artery),   right endoscopic vein harvest, and intraoperative transesophageal   echocardiography.    TTE 1/29/21  CONCLUSIONS  Compared to the images of the prior study done on 07/27/18  Left ventricular ejection fraction is visually estimated to be 55%.   Apical septum hypokinesis. Normal diastolic function.  Unable to estimate pulmonary artery pressure due to an inadequate   tricuspid regurgitant jet    Medical Decision Making:  Problem List Items Addressed This Visit       Ischemic heart disease due to coronary artery obstruction (HCC)    Hx of CABG    Mixed hyperlipidemia - Primary    Relevant " Orders    Lipid Profile    Ischemic cardiomyopathy    Statin intolerance    Coronary artery disease involving native coronary artery of native heart without angina pectoris    Essential hypertension, benign         HTN - goal <130/80. Titrate enalapril. Repeat labs.     ICM recovered LVEF / CAD / CABG - asymptomatic. Continue long term aspirin, ACEi, statin.     HLD / lipoprotein a elevation - statin intolerant. Resume [id number] for better LDL and lipoprotein control. Threshold goal LDL <70 and trig <150. Repeat lipids.    It was my pleasure to meet with Mr. Reyes.    Today's visit is associated with medical care services that serve as the continuing focal point for all necessary health care services related to the patient's single, serious condition or multiple chronic complex conditions.  This includes providing services to the patient on an ongoing basis that results in care that is collaborative and personalized to the patient.  The services result in a comprehensive, longitudinal, and continuous relationship with the patient and involve delivery of team-based care that is accessible, coordinated with other practitioners and providers, and integrated with the broader health care landscape.

## 2025-08-06 PROCEDURE — RXMED WILLOW AMBULATORY MEDICATION CHARGE: Performed by: PHYSICIAN ASSISTANT

## 2025-08-07 ENCOUNTER — APPOINTMENT (OUTPATIENT)
Dept: URBAN - METROPOLITAN AREA CLINIC 4 | Facility: CLINIC | Age: 65
Setting detail: DERMATOLOGY
End: 2025-08-07

## 2025-08-07 DIAGNOSIS — A63.0 ANOGENITAL (VENEREAL) WARTS: ICD-10-CM

## 2025-08-07 PROCEDURE — ? LIQUID NITROGEN GENITALS

## 2025-08-07 PROCEDURE — ? PRESCRIPTION

## 2025-08-07 PROCEDURE — ? COUNSELING

## 2025-08-07 ASSESSMENT — LOCATION DETAILED DESCRIPTION DERM: LOCATION DETAILED: SUPRAPUBIC SKIN

## 2025-08-07 ASSESSMENT — LOCATION SIMPLE DESCRIPTION DERM: LOCATION SIMPLE: GROIN

## 2025-08-07 ASSESSMENT — LOCATION ZONE DERM: LOCATION ZONE: TRUNK

## 2025-08-10 RX ORDER — PODOFILOX 5 MG/ML
SOLUTION TOPICAL BID
Qty: 3.5 | Refills: 3 | Status: ERX | COMMUNITY
Start: 2025-08-10

## 2025-08-10 RX ADMIN — PODOFILOX: 5 SOLUTION TOPICAL at 00:00

## 2025-08-11 PROCEDURE — RXMED WILLOW AMBULATORY MEDICATION CHARGE: Performed by: PHYSICIAN ASSISTANT

## 2025-08-12 ENCOUNTER — PHARMACY VISIT (OUTPATIENT)
Dept: PHARMACY | Facility: MEDICAL CENTER | Age: 65
End: 2025-08-12
Payer: COMMERCIAL

## 2025-08-13 ENCOUNTER — HOSPITAL ENCOUNTER (OUTPATIENT)
Dept: RADIOLOGY | Facility: MEDICAL CENTER | Age: 65
End: 2025-08-13
Attending: PHYSICIAN ASSISTANT
Payer: MEDICARE

## 2025-08-13 ENCOUNTER — HOSPITAL ENCOUNTER (OUTPATIENT)
Dept: LAB | Facility: MEDICAL CENTER | Age: 65
End: 2025-08-13
Attending: INTERNAL MEDICINE
Payer: MEDICARE

## 2025-08-13 DIAGNOSIS — Z78.9 STATIN INTOLERANCE: ICD-10-CM

## 2025-08-13 DIAGNOSIS — E78.41 ELEVATED LIPOPROTEIN(A): ICD-10-CM

## 2025-08-13 DIAGNOSIS — I10 ESSENTIAL HYPERTENSION, BENIGN: ICD-10-CM

## 2025-08-13 DIAGNOSIS — I25.10 CORONARY ARTERY DISEASE INVOLVING NATIVE CORONARY ARTERY OF NATIVE HEART WITHOUT ANGINA PECTORIS: ICD-10-CM

## 2025-08-13 DIAGNOSIS — D73.89 SPLENIC LESION: ICD-10-CM

## 2025-08-13 DIAGNOSIS — Z95.1 HX OF CABG: ICD-10-CM

## 2025-08-13 LAB
ALBUMIN SERPL BCP-MCNC: 4.3 G/DL (ref 3.2–4.9)
ALBUMIN/GLOB SERPL: 1.8 G/DL
ALP SERPL-CCNC: 70 U/L (ref 30–99)
ALT SERPL-CCNC: 15 U/L (ref 2–50)
ANION GAP SERPL CALC-SCNC: 12 MMOL/L (ref 7–16)
AST SERPL-CCNC: 17 U/L (ref 12–45)
BILIRUB SERPL-MCNC: 0.6 MG/DL (ref 0.1–1.5)
BUN SERPL-MCNC: 21 MG/DL (ref 8–22)
CALCIUM ALBUM COR SERPL-MCNC: 9.3 MG/DL (ref 8.5–10.5)
CALCIUM SERPL-MCNC: 9.5 MG/DL (ref 8.5–10.5)
CHLORIDE SERPL-SCNC: 106 MMOL/L (ref 96–112)
CHOLEST SERPL-MCNC: 197 MG/DL (ref 100–199)
CO2 SERPL-SCNC: 20 MMOL/L (ref 20–33)
CREAT SERPL-MCNC: 1.04 MG/DL (ref 0.5–1.4)
FASTING STATUS PATIENT QL REPORTED: NORMAL
GFR SERPLBLD CREATININE-BSD FMLA CKD-EPI: 80 ML/MIN/1.73 M 2
GLOBULIN SER CALC-MCNC: 2.4 G/DL (ref 1.9–3.5)
GLUCOSE SERPL-MCNC: 107 MG/DL (ref 65–99)
HDLC SERPL-MCNC: 57 MG/DL
LDLC SERPL CALC-MCNC: 125 MG/DL
POTASSIUM SERPL-SCNC: 4.2 MMOL/L (ref 3.6–5.5)
PROT SERPL-MCNC: 6.7 G/DL (ref 6–8.2)
SODIUM SERPL-SCNC: 138 MMOL/L (ref 135–145)
TRIGL SERPL-MCNC: 73 MG/DL (ref 0–149)

## 2025-08-13 PROCEDURE — 80061 LIPID PANEL: CPT

## 2025-08-13 PROCEDURE — 82172 ASSAY OF APOLIPOPROTEIN: CPT

## 2025-08-13 PROCEDURE — 80053 COMPREHEN METABOLIC PANEL: CPT

## 2025-08-13 PROCEDURE — 76700 US EXAM ABDOM COMPLETE: CPT

## 2025-08-13 PROCEDURE — 36415 COLL VENOUS BLD VENIPUNCTURE: CPT

## 2025-08-14 ENCOUNTER — NON-PROVIDER VISIT (OUTPATIENT)
Dept: CARDIOLOGY | Facility: MEDICAL CENTER | Age: 65
End: 2025-08-14
Attending: INTERNAL MEDICINE
Payer: MEDICARE

## 2025-08-14 VITALS
HEART RATE: 60 BPM | BODY MASS INDEX: 26.83 KG/M2 | WEIGHT: 209 LBS | DIASTOLIC BLOOD PRESSURE: 80 MMHG | SYSTOLIC BLOOD PRESSURE: 140 MMHG

## 2025-08-14 DIAGNOSIS — I25.10 CORONARY ARTERY DISEASE INVOLVING NATIVE CORONARY ARTERY OF NATIVE HEART WITHOUT ANGINA PECTORIS: ICD-10-CM

## 2025-08-14 DIAGNOSIS — E78.41 ELEVATED LIPOPROTEIN(A): Primary | ICD-10-CM

## 2025-08-14 DIAGNOSIS — E78.2 MIXED HYPERLIPIDEMIA: ICD-10-CM

## 2025-08-14 PROCEDURE — 99214 OFFICE O/P EST MOD 30 MIN: CPT | Performed by: STUDENT IN AN ORGANIZED HEALTH CARE EDUCATION/TRAINING PROGRAM

## 2025-08-14 ASSESSMENT — FIBROSIS 4 INDEX: FIB4 SCORE: 1.51

## 2025-08-15 ENCOUNTER — RESULTS FOLLOW-UP (OUTPATIENT)
Dept: CARDIOLOGY | Facility: MEDICAL CENTER | Age: 65
End: 2025-08-15
Payer: MEDICARE

## 2025-08-15 LAB — APO B100 SERPL-MCNC: 104 MG/DL (ref 66–133)

## (undated) DEVICE — PROTECTOR ULNA NERVE - (36PR/CA)

## (undated) DEVICE — TRAY SURESTEP FOLEY TEMP SENSING 16FR (10EA/CA) ORDER  #18764 FOR TEMP FOLEY ONLY

## (undated) DEVICE — DRESSING TRANSPARENT FILM TEGADERM 2.375 X 2.75"  (100EA/BX)"

## (undated) DEVICE — TUBE E-T HI-LO CUFF 8.5MM (10EA/PK)

## (undated) DEVICE — DRESSING TRANSPARENT FILM TEGADERM 4 X 4.75" (50EA/BX)"

## (undated) DEVICE — CANISTER SUCTION 3000ML MECHANICAL FILTER AUTO SHUTOFF MEDI-VAC NONSTERILE LF DISP  (40EA/CA)

## (undated) DEVICE — DERMABOND ADVANCED - (12EA/BX)

## (undated) DEVICE — PACK CV DRAPING/BASIN 2PART - (1/CA)

## (undated) DEVICE — TUBING CLEARLINK DUO-VENT - C-FLO (48EA/CA)

## (undated) DEVICE — SYRINGE 30 ML LL (56/BX)

## (undated) DEVICE — MASK ANESTHESIA ADULT  - (100/CA)

## (undated) DEVICE — ADHESIVE DERMABOND HVD MINI (12EA/BX)

## (undated) DEVICE — SENSOR SPO2 NEO LNCS ADHESIVE (20/BX) SEE USER NOTES

## (undated) DEVICE — PACK VEIN - (19/CA)

## (undated) DEVICE — KIT RADIAL ARTERY 20GA W/MAX BARRIER AND BIOPATCH  (5EA/CA) #10740 IS FOR THE SET RADIAL ARTERIAL

## (undated) DEVICE — KIT ROOM DECONTAMINATION

## (undated) DEVICE — ARMBOARD  SMALL IV 9 INLONG - (25EA/CA)

## (undated) DEVICE — SYS DLV COST CLS RM TEMP - INJECTATE (CO-SET II) (10EA/CA)

## (undated) DEVICE — INSERT STEALTH #5 - (10/BX)

## (undated) DEVICE — PACK E SUTURE USED FOR - OPEN HEART  (5/BX)

## (undated) DEVICE — GOWN SURGEONS LARGE - (32/CA)

## (undated) DEVICE — BLADE STERNUM SAW SURGICAL 32.0 X 6.4 MM STERILE (1/EA)

## (undated) DEVICE — SOD. CHL. INJ. 0.9% 1000 ML - (14EA/CA 60CA/PF)

## (undated) DEVICE — BLADE SURGICAL CLIPPER - (50EA/CA)

## (undated) DEVICE — DRAIN CHEST ADULT (6EA/CA) DELETED ITEM  ORDER #15909

## (undated) DEVICE — BAG, SPONGE COUNT 50600

## (undated) DEVICE — KIT ANESTHESIA W/CIRCUIT & 3/LT BAG W/FILTER (20EA/CA)

## (undated) DEVICE — WIRE STEEL 5-0 B&S 20 OHS - 5/PK 12PK/BX ITEM. D5329 OR D6625 CAN BE USED AS A SUB

## (undated) DEVICE — KIT ENDOHARVEST SYSTEM 8 - MUST ORDER 5 AT A TIME

## (undated) DEVICE — SUTURE 6-0 PROLENE RB-2 D/A 30 (36PK/BX)"

## (undated) DEVICE — SET FLUID WARMING STANDARD FLOW - (10/CA)

## (undated) DEVICE — GLOVE BIOGEL INDICATOR SZ 8 SURGICAL PF LTX - (50/BX 4BX/CA)

## (undated) DEVICE — LEAD PACING TEMP MYO - (12/BX)

## (undated) DEVICE — SPRING BULLDOG 1/2 FORCE BLUE - (10/BX)

## (undated) DEVICE — FIBRILLAR SURGICEL 4X4 - 10/CA

## (undated) DEVICE — DRAPE MAYO STAND - (30/CA)

## (undated) DEVICE — MICRODRIP PRIMARY VENTED 60 (48EA/CA) THIS WAS PART #2C8428 WHICH WAS DISCONTINUED

## (undated) DEVICE — BAG RESUSCITATION DISPOSABLE - WITH MASK (10 EA/CA)

## (undated) DEVICE — SUCTION INSTRUMENT YANKAUER BULBOUS TIP W/O VENT (50EA/CA)

## (undated) DEVICE — SLEEVE, VASO, THIGH, MED

## (undated) DEVICE — SUTURE 4-0 30CM STRATAFIX SPIRAL PS-2 (12EA/BX)

## (undated) DEVICE — GLOVE SURGICAL LATEX POWDER FREE STIRLE SZ 8 ENCORE MICROPTIC (200PR/CA)

## (undated) DEVICE — KIT INTROPERCUTANEOUS SHEATH - 8.5 FR W/MAX BARRIER AND BIOPATCH  (5/CA)

## (undated) DEVICE — LACTATED RINGERS INJ 1000 ML - (14EA/CA 60CA/PF)

## (undated) DEVICE — NEPTUNE 4 PORT MANIFOLD - (20/PK)

## (undated) DEVICE — RETRACTOR OFF PUMP OCTO ONLY - 10/BX

## (undated) DEVICE — NEEDLE SAFETY 18 GA X 1 1/2 IN (100EA/BX)

## (undated) DEVICE — GLOVE BIOGEL SZ 7.5 SURGICAL PF LTX - (50PR/BX 4BX/CA)

## (undated) DEVICE — SUTURE 7-0 PROLENE BV-1 D/A 30 (36PK/BX)"

## (undated) DEVICE — ELECTRODE DUAL RETURN W/ CORD - (50/PK)

## (undated) DEVICE — SODIUM CHL IRRIGATION 0.9% 1000ML (12EA/CA)

## (undated) DEVICE — BLADE SURGICAL #15 - (50/BX 3BX/CA)

## (undated) DEVICE — TRANSDUCER BIFURCATED MONITORING KIT (10EA/CA)

## (undated) DEVICE — ELECTRODE 850 FOAM ADHESIVE - HYDROGEL RADIOTRNSPRNT (50/PK)

## (undated) DEVICE — SYRINGE SAFETY 3 ML 18 GA X 1 1/2 BLUNT LL (100/BX 8BX/CA)

## (undated) DEVICE — SENSOR CEREBRAL AND SOMATIC MONITORING (20/CA)

## (undated) DEVICE — SET LEADWIRE 5 LEAD BEDSIDE DISPOSABLE ECG (1SET OF 5/EA)

## (undated) DEVICE — HEAD HOLDER JUNIOR/ADULT

## (undated) DEVICE — SUTURE 5-0 PROLENE C-1 D/A 24 (36PK/BX)"

## (undated) DEVICE — GLOVE BIOGEL PI INDICATOR SZ 6.5 SURGICAL PF LF - (50/BX 4BX/CA)

## (undated) DEVICE — BAG DECANTER (50EA/CS)

## (undated) DEVICE — TUBING INSUFFLATION - (10/BX)

## (undated) DEVICE — SOLUTION NORMOSOL-4 INJ 1000ML

## (undated) DEVICE — TUBE CHEST 32FR. STRAIGHT - (10EA/CA)

## (undated) DEVICE — SET BIFURCATED BLOOD - (48EA/CS)

## (undated) DEVICE — STOPCOCK MALE 4-WAY - (50/CA)

## (undated) DEVICE — TRAY MULTI-LUMEN 7FR PRESSURE W/MAX BARRIER AND BIOPATCH - (5/CA)

## (undated) DEVICE — CATHETER THERMALDILUTION SWAN - (5EA/CA)

## (undated) DEVICE — TUBE CHEST 32FR. RIGHT ANGLED (10EA/CA)

## (undated) DEVICE — SET EXTENSION WITH 2 PORTS (48EA/CA) ***PART #2C8610 IS A SUBSTITUTE*****

## (undated) DEVICE — SUTURE OHS

## (undated) DEVICE — PUNCH DISP VASCULAR 4.4 - 6/BX

## (undated) DEVICE — SODIUM CHL. INJ. 0.9% 500ML (24EA/CA 50CA/PF)